# Patient Record
Sex: FEMALE | NOT HISPANIC OR LATINO | Employment: FULL TIME | URBAN - METROPOLITAN AREA
[De-identification: names, ages, dates, MRNs, and addresses within clinical notes are randomized per-mention and may not be internally consistent; named-entity substitution may affect disease eponyms.]

---
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
## 2019-01-02 DIAGNOSIS — M32.14 SYSTEMIC LUPUS ERYTHEMATOSUS WITH GLOMERULAR DISEASE, UNSPECIFIED SLE TYPE (H): ICD-10-CM

## 2019-01-03 RX ORDER — AMLODIPINE BESYLATE 10 MG/1
10 TABLET ORAL 2 TIMES DAILY
Qty: 180 TABLET | Refills: 0 | Status: SHIPPED | OUTPATIENT
Start: 2019-01-03 | End: 2020-01-22

## 2020-01-16 ENCOUNTER — TELEPHONE (OUTPATIENT)
Dept: RHEUMATOLOGY | Facility: CLINIC | Age: 20
End: 2020-01-16

## 2020-01-16 DIAGNOSIS — M32.14 SYSTEMIC LUPUS ERYTHEMATOSUS WITH GLOMERULAR DISEASE, UNSPECIFIED SLE TYPE (H): Primary | ICD-10-CM

## 2020-01-16 NOTE — TELEPHONE ENCOUNTER
I spoke to Cathy. She confirmed date and time of appointment and will have labs done prior to then. No further questions.

## 2020-01-16 NOTE — TELEPHONE ENCOUNTER
Please call patient. Ask her to have lab testing done at an F location prior to her appointment next week. Remind her of date and time of appt.   I placed lab orders today.

## 2020-01-20 ENCOUNTER — HOSPITAL ENCOUNTER (OUTPATIENT)
Dept: LAB | Facility: CLINIC | Age: 20
Discharge: HOME | End: 2020-01-20
Attending: PEDIATRICS | Admitting: PEDIATRICS
Payer: COMMERCIAL

## 2020-01-20 DIAGNOSIS — M32.14 SYSTEMIC LUPUS ERYTHEMATOSUS WITH GLOMERULAR DISEASE, UNSPECIFIED SLE TYPE (H): ICD-10-CM

## 2020-01-20 LAB
ALBUMIN SERPL-MCNC: 3.6 G/DL (ref 3.4–5)
ALBUMIN UR-MCNC: 50 MG/DL
ALP SERPL-CCNC: 68 U/L (ref 40–150)
ALT SERPL W P-5'-P-CCNC: 19 U/L (ref 0–50)
ANION GAP SERPL CALCULATED.3IONS-SCNC: 5 MMOL/L (ref 3–14)
APPEARANCE UR: CLEAR
AST SERPL W P-5'-P-CCNC: 9 U/L (ref 0–45)
BASOPHILS # BLD AUTO: 0.1 10E9/L (ref 0–0.2)
BASOPHILS NFR BLD AUTO: 0.6 %
BILIRUB SERPL-MCNC: 0.5 MG/DL (ref 0.2–1.3)
BILIRUB UR QL STRIP: NEGATIVE
BUN SERPL-MCNC: 12 MG/DL (ref 7–30)
C3 SERPL-MCNC: 107 MG/DL (ref 81–157)
C4 SERPL-MCNC: 19 MG/DL (ref 13–39)
CALCIUM SERPL-MCNC: 9.2 MG/DL (ref 8.5–10.1)
CHLORIDE SERPL-SCNC: 108 MMOL/L (ref 94–109)
CO2 SERPL-SCNC: 25 MMOL/L (ref 20–32)
COLOR UR AUTO: YELLOW
CREAT SERPL-MCNC: 0.74 MG/DL (ref 0.52–1.04)
CREAT UR-MCNC: 197 MG/DL
DIFFERENTIAL METHOD BLD: ABNORMAL
EOSINOPHIL # BLD AUTO: 0.6 10E9/L (ref 0–0.7)
EOSINOPHIL NFR BLD AUTO: 7 %
ERYTHROCYTE [DISTWIDTH] IN BLOOD BY AUTOMATED COUNT: 14.1 % (ref 10–15)
ERYTHROCYTE [SEDIMENTATION RATE] IN BLOOD BY WESTERGREN METHOD: 6 MM/H (ref 0–20)
GFR SERPL CREATININE-BSD FRML MDRD: >90 ML/MIN/{1.73_M2}
GLUCOSE SERPL-MCNC: 79 MG/DL (ref 70–99)
GLUCOSE UR STRIP-MCNC: NEGATIVE MG/DL
HCT VFR BLD AUTO: 46.6 % (ref 35–47)
HGB BLD-MCNC: 14.9 G/DL (ref 11.7–15.7)
HGB UR QL STRIP: NEGATIVE
IMM GRANULOCYTES # BLD: 0 10E9/L (ref 0–0.4)
IMM GRANULOCYTES NFR BLD: 0.1 %
KETONES UR STRIP-MCNC: NEGATIVE MG/DL
LEUKOCYTE ESTERASE UR QL STRIP: NEGATIVE
LYMPHOCYTES # BLD AUTO: 3.3 10E9/L (ref 0.8–5.3)
LYMPHOCYTES NFR BLD AUTO: 41.2 %
MCH RBC QN AUTO: 28.1 PG (ref 26.5–33)
MCHC RBC AUTO-ENTMCNC: 32 G/DL (ref 31.5–36.5)
MCV RBC AUTO: 88 FL (ref 78–100)
MONOCYTES # BLD AUTO: 0.4 10E9/L (ref 0–1.3)
MONOCYTES NFR BLD AUTO: 5.5 %
MUCOUS THREADS #/AREA URNS LPF: PRESENT /LPF
NEUTROPHILS # BLD AUTO: 3.7 10E9/L (ref 1.6–8.3)
NEUTROPHILS NFR BLD AUTO: 45.6 %
NITRATE UR QL: NEGATIVE
NRBC # BLD AUTO: 0 10*3/UL
NRBC BLD AUTO-RTO: 0 /100
PH UR STRIP: 6 PH (ref 5–7)
PLATELET # BLD AUTO: 239 10E9/L (ref 150–450)
POTASSIUM SERPL-SCNC: 3.7 MMOL/L (ref 3.4–5.3)
PROT SERPL-MCNC: 7.4 G/DL (ref 6.8–8.8)
PROT UR-MCNC: 0.41 G/L
PROT/CREAT 24H UR: 0.21 G/G CR (ref 0–0.2)
RBC # BLD AUTO: 5.31 10E12/L (ref 3.8–5.2)
RBC #/AREA URNS AUTO: 1 /HPF (ref 0–2)
SODIUM SERPL-SCNC: 138 MMOL/L (ref 133–144)
SOURCE: ABNORMAL
SP GR UR STRIP: 1.03 (ref 1–1.03)
SQUAMOUS #/AREA URNS AUTO: 4 /HPF (ref 0–1)
UROBILINOGEN UR STRIP-MCNC: NORMAL MG/DL (ref 0–2)
WBC # BLD AUTO: 8 10E9/L (ref 4–11)
WBC #/AREA URNS AUTO: 1 /HPF (ref 0–5)

## 2020-01-20 PROCEDURE — 81001 URINALYSIS AUTO W/SCOPE: CPT | Performed by: PEDIATRICS

## 2020-01-20 PROCEDURE — 86225 DNA ANTIBODY NATIVE: CPT | Performed by: PEDIATRICS

## 2020-01-20 PROCEDURE — 86160 COMPLEMENT ANTIGEN: CPT | Performed by: PEDIATRICS

## 2020-01-20 PROCEDURE — 85652 RBC SED RATE AUTOMATED: CPT | Performed by: PEDIATRICS

## 2020-01-20 PROCEDURE — 84156 ASSAY OF PROTEIN URINE: CPT | Performed by: PEDIATRICS

## 2020-01-20 PROCEDURE — 80053 COMPREHEN METABOLIC PANEL: CPT | Performed by: PEDIATRICS

## 2020-01-20 PROCEDURE — 85025 COMPLETE CBC W/AUTO DIFF WBC: CPT | Performed by: PEDIATRICS

## 2020-01-21 LAB — DSDNA AB SER-ACNC: 33 IU/ML

## 2020-01-21 NOTE — PROGRESS NOTES
Patient Active Problem List   Diagnosis     Systemic lupus erythematosus (H)     Immunosuppression (H)     Lupus (systemic lupus erythematosus) (H)     Lupus nephritis, ISN/RPS class IV (H)     Hypertension          Subjective:     Cathy is a 19 year old female who was seen in Pediatric Rheumatology clinic today for a follow-up visit of SLE. Cathy is accompanied today by both parents.  Cathy was last seen in clinic on 8/15/2018 by Dr. Lio Jo, at which time she was not taking all of her medications as prescribed but overall did not have any major concerns.  She was lost to follow-up there after, despite multiple attempts we could not get her to return calls or follow-up.  Today she tells me that shortly after that visit she continued to take some of her medications intermittently and is likely out of her medications by the winter 2019.  Approximately 1 to 1-1/2 years ago.  Since then she has felt increasingly well.  She denies any problems with lupus related symptoms such as fever rash joint pain leg swelling.  Her 14 system review as noted below is negative    She was worried increasingly that she may be having some active lupus and just not be aware and decided she better come back for a follow-up visit.  By her she was on the schedule I asked her to repeat her labs a few days before, those are listed below and are remarkably normal!    Review of systems:     Review of 14 systems is negative other than noted above.        Allergies:     Allergies   Allergen Reactions     Ibuprofen Swelling     Swelling of face with a higher dose          Medications:     None       Medical --  Family -- Social History:     Past Medical History:   Diagnosis Date     Anemia of chronic disease      Anxious appearance 1/3/2018     Long term systemic steroid user 10/17/2017     Lupus nephritis, ISN/RPS class IV (H)      Non-adherence to medical treatment      Psychosis (H) 1/2/2018     Renal hypertension      Skin breakdown  "11/13/2017     SLE (systemic lupus erythematosus related syndrome) (H)      Past Surgical History:   Procedure Laterality Date     PERCUTANEOUS BIOPSY KIDNEY Right 10/6/2017    Procedure: PERCUTANEOUS BIOPSY KIDNEY;  Kidney Biopsy Percutaneous Right ;  Surgeon: Preston Russo MD;  [facility]     Family History   Problem Relation Age of Onset     Thyroid Disease Other         Cousin: hyperthyroid     Hypertension Paternal Uncle      Diabetes Maternal Aunt      Social History     Social History Narrative    Family History     Father Eliseo: Occupation [employer]      Mother Lupe: Occupation      Brother Hakan 07/11/2007: History is negative     Sister Elizabeth 06/11/2000: History is negative     Pat Sister Latasha 09/24/1985: History is negative     Pat Sister Shira 05/15/1988: History is negative     Mat Grandfather: Anemia     Family History: Autoimmune disorder Cousin        Social History     Home/Environment    Lives with: Father, Mother, Siblings. Living situation: Home.        /School/Work    Grade level: She graduated from high school in 2017.  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 rate 20, height 1.58 m (5' 2.21\"), weight 59.5 kg (131 lb 2.8 oz).    Constitutional: Alert, no distress and cooperative.  Head and Eyes: No alopecia, PEERL, conjunctiva clear.  ENT: Mucous membranes moist, healthy appearing dentition, no intraoral ulcers, and no intranasal ulcers.  Neck: Neck supple. No lymphadenopathy. Thyroid symmetric, normal size.  Respiratory: Negative, clear to auscultation.  Cardiovascular: Negative, RRR. No murmurs, no rubs.  Gastrointestinal: Abdomen soft, non-tender. No masses. No hepatosplenomegaly.  : Deferred.  Neurologic: Gait normal. Sensation grossly " normal.  Psychiatric: Mentation appears normal and affect normal.  Hematologic/Lymphatic/Immunologic: Normal cervical, axillary lymph nodes.  Skin: Diffuse striae in the lower extremities  Musculoskeletal: Gait normal, extremities warm, well perfused. Detailed musculoskeletal exam was performed, normal muscle strength of trunk, upper and lower extremities and no sign of swelling, tenderness, decreased ROM, or tenderness at typical sites of enthesitis unless otherwise noted.          Last Imaging Results:     Results for orders placed or performed in visit on 01/29/18   XR Chest 2 Views    Narrative    XR CHEST 2 VW  1/29/2018 9:43 AM      HISTORY: ; Systemic lupus erythematosus with lung involvement,  unspecified SLE type (H)    COMPARISON: 10/4/2017    FINDINGS: Frontal and lateral views of the chest. The cardiac  silhouette size and pulmonary vasculature are within normal limits.  There is no significant pleural effusion or pneumothorax. There are no  focal pulmonary opacities. The visualized upper abdomen and bones  appear normal.      Impression    IMPRESSION: Clear lungs.    SALEEM BECERRA MD          Last Lab Results:     No visits with results within 2 Day(s) from this visit.   Latest known visit with results is:   Hospital Outpatient Visit on 01/20/2020   Component Date Value     Sed Rate 01/20/2020 6      Complement C4 01/20/2020 19      Complement C3 01/20/2020 107      Sodium 01/20/2020 138      Potassium 01/20/2020 3.7      Chloride 01/20/2020 108      Carbon Dioxide 01/20/2020 25      Anion Gap 01/20/2020 5      Glucose 01/20/2020 79      Urea Nitrogen 01/20/2020 12      Creatinine 01/20/2020 0.74      GFR Estimate 01/20/2020 >90      GFR Estimate If Black 01/20/2020 >90      Calcium 01/20/2020 9.2      Bilirubin Total 01/20/2020 0.5      Albumin 01/20/2020 3.6      Protein Total 01/20/2020 7.4      Alkaline Phosphatase 01/20/2020 68      ALT 01/20/2020 19      AST 01/20/2020 9      DNA-ds 01/20/2020 33*      WBC 01/20/2020 8.0      RBC Count 01/20/2020 5.31*     Hemoglobin 01/20/2020 14.9      Hematocrit 01/20/2020 46.6      MCV 01/20/2020 88      MCH 01/20/2020 28.1      MCHC 01/20/2020 32.0      RDW 01/20/2020 14.1      Platelet Count 01/20/2020 239      Diff Method 01/20/2020 Automated Method      % Neutrophils 01/20/2020 45.6      % Lymphocytes 01/20/2020 41.2      % Monocytes 01/20/2020 5.5      % Eosinophils 01/20/2020 7.0      % Basophils 01/20/2020 0.6      % Immature Granulocytes 01/20/2020 0.1      Nucleated RBCs 01/20/2020 0      Absolute Neutrophil 01/20/2020 3.7      Absolute Lymphocytes 01/20/2020 3.3      Absolute Monocytes 01/20/2020 0.4      Absolute Eosinophils 01/20/2020 0.6      Absolute Basophils 01/20/2020 0.1      Abs Immature Granulocytes 01/20/2020 0.0      Absolute Nucleated RBC 01/20/2020 0.0      Protein Random Urine 01/20/2020 0.41      Protein Total Urine g/gr* 01/20/2020 0.21*     Color Urine 01/20/2020 Yellow      Appearance Urine 01/20/2020 Clear      Glucose Urine 01/20/2020 Negative      Bilirubin Urine 01/20/2020 Negative      Ketones Urine 01/20/2020 Negative      Specific Gravity Urine 01/20/2020 1.026      Blood Urine 01/20/2020 Negative      pH Urine 01/20/2020 6.0      Protein Albumin Urine 01/20/2020 50*     Urobilinogen mg/dL 01/20/2020 Normal      Nitrite Urine 01/20/2020 Negative      Leukocyte Esterase Urine 01/20/2020 Negative      Source 01/20/2020 Midstream Urine      WBC Urine 01/20/2020 1      RBC Urine 01/20/2020 1      Squamous Epithelial /HPF* 01/20/2020 4*     Mucous Urine 01/20/2020 Present*     Creatinine Urine 01/20/2020 197           Assessment :      Systemic lupus erythematosus with glomerular disease, unspecified SLE type (H)  Lupus nephritis, ISN/RPS class IV (H)  Systemic lupus erythematosus (SLE) with pericarditis, unspecified SLE type (H)  Hypertension secondary to other renal disorders    Cathy is a 19-year-old girl with a history of quite severe  systemic lupus erythematosus due to multiple factors including poor adherence to medications as a result of difficulty with adjustment to illness in addition 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  I had thought when I did not see her come back that she was likely being cared for by an adult rheumatologist as I am surprised today that she can look so well off medications completely.  Other than mild hypertension today I think she really looks amazing and I would not recommend she restart medications at this time.  I did however emphasize the need for regular laboratory testing to monitor for problems.  It was much easier for her to make decisions about treatment if she knows if there is any underlying serious problems.    With regard to the hypertension today, she tells me she was nervous and we decided together that this needs to be evaluated with her new physician.         Recommendations and follow-up:     1. Treatment at this time.  I gave her recommendations and phone numbers for both [facility] the [facility]ology and [place] [facility] based on where she lives in [place].  I asked her to call for appointments and to let us know so that we advise her on how to get records sent.  The next next eturn visit: Return in about 5 months (around 6/22/2020).    If there are any new questions or concerns, I would be glad to help and can be reached through our main office at [phone] or our paging  at [phone].    This document serves as a record of the services and decisions personally performed and made by Jacey Fuchs MD. It was created on her behalf by Elaine Davenport, a trained medical scribe. The creation of this document is based the provider's statements to the medical scribe. The documentation recorded by the scribe accurately reflects the services I personally performed and the decisions made by me. I spent a total of 25 minutes  face-to-face with Cathy during today's office visit.  Over 50% of this time was spent counseling the patient and/or coordinating care. See note for details.    Jacey Fuchs MD, MS    CC  Patient Care Team:  Jacey Fuchs MD as PCP - General (Pediatric Rheumatology)  Jacey Fuchs MD as MD (Pediatric Rheumatology)  Osvaldo Block MD as MD (Pediatric Nephrology)  Carina Davis, NP as Assigned PCP  [facility]  Kali Hicks, SOPHIA CNP as Nurse Practitioner (Nurse Practitioner)  SELF, REFERRED    Copy to patient  CAITLYN [facility]TISTE SEBASTIAN  [address] [place] [address]

## 2020-01-22 ENCOUNTER — OFFICE VISIT (OUTPATIENT)
Dept: RHEUMATOLOGY | Facility: CLINIC | Age: 20
End: 2020-01-22
Attending: PEDIATRICS
Payer: COMMERCIAL

## 2020-01-22 VITALS
TEMPERATURE: 98.1 F | BODY MASS INDEX: 24.14 KG/M2 | DIASTOLIC BLOOD PRESSURE: 85 MMHG | RESPIRATION RATE: 20 BRPM | SYSTOLIC BLOOD PRESSURE: 122 MMHG | WEIGHT: 131.17 LBS | HEART RATE: 92 BPM | HEIGHT: 62 IN

## 2020-01-22 DIAGNOSIS — I15.1 HYPERTENSION SECONDARY TO OTHER RENAL DISORDERS: ICD-10-CM

## 2020-01-22 DIAGNOSIS — M32.14 SYSTEMIC LUPUS ERYTHEMATOSUS WITH GLOMERULAR DISEASE, UNSPECIFIED SLE TYPE (H): Primary | ICD-10-CM

## 2020-01-22 DIAGNOSIS — M32.14 LUPUS NEPHRITIS, ISN/RPS CLASS IV (H): ICD-10-CM

## 2020-01-22 DIAGNOSIS — M32.12 SYSTEMIC LUPUS ERYTHEMATOSUS (SLE) WITH PERICARDITIS, UNSPECIFIED SLE TYPE (H): ICD-10-CM

## 2020-01-22 PROBLEM — R45.89 ANXIOUS APPEARANCE: Status: RESOLVED | Noted: 2018-01-03 | Resolved: 2020-01-22

## 2020-01-22 PROBLEM — Z79.52 LONG TERM SYSTEMIC STEROID USER: Status: RESOLVED | Noted: 2017-10-17 | Resolved: 2020-01-22

## 2020-01-22 PROBLEM — F29 PSYCHOSIS (H): Status: RESOLVED | Noted: 2018-01-02 | Resolved: 2020-01-22

## 2020-01-22 PROBLEM — R23.8 SKIN BREAKDOWN: Status: RESOLVED | Noted: 2017-11-13 | Resolved: 2020-01-22

## 2020-01-22 PROCEDURE — G0463 HOSPITAL OUTPT CLINIC VISIT: HCPCS | Mod: ZF

## 2020-01-22 ASSESSMENT — MIFFLIN-ST. JEOR: SCORE: 1321.5

## 2020-01-22 ASSESSMENT — PAIN SCALES - GENERAL: PAINLEVEL: NO PAIN (0)

## 2020-01-22 NOTE — NURSING NOTE
"Chief Complaint   Patient presents with     Arthritis     Lupus.     Vitals:    01/22/20 0755 01/22/20 0758   BP: (!) 109/93 122/85   BP Location: Left arm    Patient Position: Dangled    Pulse: 92    Resp: 20    Temp: 98.1  F (36.7  C)    TempSrc: Oral    Weight: 131 lb 2.8 oz (59.5 kg)    Height: 5' 2.21\" (158 cm)       Christina Narvaez M.A.  January 22, 2020  "

## 2020-01-22 NOTE — PATIENT INSTRUCTIONS
[facility]  [place] [facility]    Our [place] [facility] is located in the south west corner of [facility] and [address] [place] [address] in the [facility]. Access to parking is off of [address] between [facility] and [facility].    [address], [address], [place], [address]    Tel: [phone]  _______________________________________  [facility]    [facility] - Rheumatology  [address] [place] [address]  [phone]

## 2020-01-22 NOTE — LETTER
1/22/2020      RE: Cathy Freedman  [address] [place] [address]       Patient Active Problem List   Diagnosis     Systemic lupus erythematosus (H)     Immunosuppression (H)     Lupus (systemic lupus erythematosus) (H)     Lupus nephritis, ISN/RPS class IV (H)     Hypertension          Subjective:     Cathy is a 19 year old female who was seen in Pediatric Rheumatology clinic today for a follow-up visit of SLE. Cathy is accompanied today by both parents.  Cathy was last seen in clinic on 8/15/2018 by Dr. Lio Jo, at which time she was not taking all of her medications as prescribed but overall did not have any major concerns.  She was lost to follow-up there after, despite multiple attempts we could not get her to return calls or follow-up.  Today she tells me that shortly after that visit she continued to take some of her medications intermittently and is likely out of her medications by the winter 2019.  Approximately 1 to 1-1/2 years ago.  Since then she has felt increasingly well.  She denies any problems with lupus related symptoms such as fever rash joint pain leg swelling.  Her 14 system review as noted below is negative    She was worried increasingly that she may be having some active lupus and just not be aware and decided she better come back for a follow-up visit.  By her she was on the schedule I asked her to repeat her labs a few days before, those are listed below and are remarkably normal!    Review of systems:     Review of 14 systems is negative other than noted above.        Allergies:     Allergies   Allergen Reactions     Ibuprofen Swelling     Swelling of face with a higher dose          Medications:     None       Medical --  Family -- Social History:     Past Medical History:   Diagnosis Date     Anemia of chronic disease      Anxious appearance 1/3/2018     Long term systemic steroid user 10/17/2017     Lupus nephritis, ISN/RPS class IV (H)      Non-adherence to  "medical treatment      Psychosis (H) 1/2/2018     Renal hypertension      Skin breakdown 11/13/2017     SLE (systemic lupus erythematosus related syndrome) (H)      Past Surgical History:   Procedure Laterality Date     PERCUTANEOUS BIOPSY KIDNEY Right 10/6/2017    Procedure: PERCUTANEOUS BIOPSY KIDNEY;  Kidney Biopsy Percutaneous Right ;  Surgeon: Preston Russo MD;  [facility]     Family History   Problem Relation Age of Onset     Thyroid Disease Other         Cousin: hyperthyroid     Hypertension Paternal Uncle      Diabetes Maternal Aunt      Social History     Social History Narrative    Family History     Father Eliseo: Occupation [employer]      Mother Lupe: Occupation      Brother Hakan 07/11/2007: History is negative     Sister Elizabeth 06/11/2000: History is negative     Pat Sister Latasha 09/24/1985: History is negative     Pat Sister Shira 05/15/1988: History is negative     Mat Grandfather: Anemia     Family History: Autoimmune disorder Cousin        Social History     Home/Environment    Lives with: Father, Mother, Siblings. Living situation: Home.        /School/Work    Grade level: She graduated from high school in 2017.  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 rate 20, height 1.58 m (5' 2.21\"), weight 59.5 kg (131 lb 2.8 oz).    Constitutional: Alert, no distress and cooperative.  Head and Eyes: No alopecia, PEERL, conjunctiva clear.  ENT: Mucous membranes moist, healthy appearing dentition, no intraoral ulcers, and no intranasal ulcers.  Neck: Neck supple. No lymphadenopathy. Thyroid symmetric, normal size.  Respiratory: Negative, clear to auscultation.  Cardiovascular: Negative, RRR. No murmurs, no rubs.  Gastrointestinal: Abdomen soft, non-tender. No " masses. No hepatosplenomegaly.  : Deferred.  Neurologic: Gait normal. Sensation grossly normal.  Psychiatric: Mentation appears normal and affect normal.  Hematologic/Lymphatic/Immunologic: Normal cervical, axillary lymph nodes.  Skin: Diffuse striae in the lower extremities  Musculoskeletal: Gait normal, extremities warm, well perfused. Detailed musculoskeletal exam was performed, normal muscle strength of trunk, upper and lower extremities and no sign of swelling, tenderness, decreased ROM, or tenderness at typical sites of enthesitis unless otherwise noted.          Last Imaging Results:     Results for orders placed or performed in visit on 01/29/18   XR Chest 2 Views    Narrative    XR CHEST 2 VW  1/29/2018 9:43 AM      HISTORY: ; Systemic lupus erythematosus with lung involvement,  unspecified SLE type (H)    COMPARISON: 10/4/2017    FINDINGS: Frontal and lateral views of the chest. The cardiac  silhouette size and pulmonary vasculature are within normal limits.  There is no significant pleural effusion or pneumothorax. There are no  focal pulmonary opacities. The visualized upper abdomen and bones  appear normal.      Impression    IMPRESSION: Clear lungs.    SALEEM BECERRA MD          Last Lab Results:     No visits with results within 2 Day(s) from this visit.   Latest known visit with results is:   Hospital Outpatient Visit on 01/20/2020   Component Date Value     Sed Rate 01/20/2020 6      Complement C4 01/20/2020 19      Complement C3 01/20/2020 107      Sodium 01/20/2020 138      Potassium 01/20/2020 3.7      Chloride 01/20/2020 108      Carbon Dioxide 01/20/2020 25      Anion Gap 01/20/2020 5      Glucose 01/20/2020 79      Urea Nitrogen 01/20/2020 12      Creatinine 01/20/2020 0.74      GFR Estimate 01/20/2020 >90      GFR Estimate If Black 01/20/2020 >90      Calcium 01/20/2020 9.2      Bilirubin Total 01/20/2020 0.5      Albumin 01/20/2020 3.6      Protein Total 01/20/2020 7.4      Alkaline  Phosphatase 01/20/2020 68      ALT 01/20/2020 19      AST 01/20/2020 9      DNA-ds 01/20/2020 33*     WBC 01/20/2020 8.0      RBC Count 01/20/2020 5.31*     Hemoglobin 01/20/2020 14.9      Hematocrit 01/20/2020 46.6      MCV 01/20/2020 88      MCH 01/20/2020 28.1      MCHC 01/20/2020 32.0      RDW 01/20/2020 14.1      Platelet Count 01/20/2020 239      Diff Method 01/20/2020 Automated Method      % Neutrophils 01/20/2020 45.6      % Lymphocytes 01/20/2020 41.2      % Monocytes 01/20/2020 5.5      % Eosinophils 01/20/2020 7.0      % Basophils 01/20/2020 0.6      % Immature Granulocytes 01/20/2020 0.1      Nucleated RBCs 01/20/2020 0      Absolute Neutrophil 01/20/2020 3.7      Absolute Lymphocytes 01/20/2020 3.3      Absolute Monocytes 01/20/2020 0.4      Absolute Eosinophils 01/20/2020 0.6      Absolute Basophils 01/20/2020 0.1      Abs Immature Granulocytes 01/20/2020 0.0      Absolute Nucleated RBC 01/20/2020 0.0      Protein Random Urine 01/20/2020 0.41      Protein Total Urine g/gr* 01/20/2020 0.21*     Color Urine 01/20/2020 Yellow      Appearance Urine 01/20/2020 Clear      Glucose Urine 01/20/2020 Negative      Bilirubin Urine 01/20/2020 Negative      Ketones Urine 01/20/2020 Negative      Specific Gravity Urine 01/20/2020 1.026      Blood Urine 01/20/2020 Negative      pH Urine 01/20/2020 6.0      Protein Albumin Urine 01/20/2020 50*     Urobilinogen mg/dL 01/20/2020 Normal      Nitrite Urine 01/20/2020 Negative      Leukocyte Esterase Urine 01/20/2020 Negative      Source 01/20/2020 Midstream Urine      WBC Urine 01/20/2020 1      RBC Urine 01/20/2020 1      Squamous Epithelial /HPF* 01/20/2020 4*     Mucous Urine 01/20/2020 Present*     Creatinine Urine 01/20/2020 197           Assessment :      Systemic lupus erythematosus with glomerular disease, unspecified SLE type (H)  Lupus nephritis, ISN/RPS class IV (H)  Systemic lupus erythematosus (SLE) with pericarditis, unspecified SLE type (H)  Hypertension  secondary to other renal disorders    Cathy is a 19-year-old girl with a history of quite severe systemic lupus erythematosus due to multiple factors including poor adherence to medications as a result of difficulty with adjustment to illness in addition 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  I had thought when I did not see her come back that she was likely being cared for by an adult rheumatologist as I am surprised today that she can look so well off medications completely.  Other than mild hypertension today I think she really looks amazing and I would not recommend she restart medications at this time.  I did however emphasize the need for regular laboratory testing to monitor for problems.  It was much easier for her to make decisions about treatment if she knows if there is any underlying serious problems.    With regard to the hypertension today, she tells me she was nervous and we decided together that this needs to be evaluated with her new physician.         Recommendations and follow-up:     1. Treatment at this time.  I gave her recommendations and phone numbers for both [facility] rheumatology and [place] [facility] based on where she lives in [place].  I asked her to call for appointments and to let us know so that we advise her on how to get records sent.  The next next eturn visit: Return in about 5 months (around 6/22/2020).    If there are any new questions or concerns, I would be glad to help and can be reached through our main office at [phone] or our paging  at [phone].    This document serves as a record of the services and decisions personally performed and made by Jacey Fuchs MD. It was created on her behalf by Elaine Davenport, a trained medical scribe. The creation of this document is based the provider's statements to the medical scribe. The documentation recorded by the scribe accurately reflects the  services I personally performed and the decisions made by me. I spent a total of 25 minutes face-to-face with Cathy during today's office visit.  Over 50% of this time was spent counseling the patient and/or coordinating care. See note for details.    Jacey Fuchs MD, MS    CC  Patient Care Team:    Osvaldo Block MD as MD (Pediatric Nephrology)  Carina Davis NP as Assigned PCP  [facility]  Kali Hicks APRN CNP as Nurse Practitioner (Nurse Practitioner)    Copy to patient  EMILE, EVELIA CAMPOS  [address] [place] [address]

## 2020-03-19 ENCOUNTER — NURSE TRIAGE (OUTPATIENT)
Dept: NURSING | Facility: CLINIC | Age: 20
End: 2020-03-19

## 2020-03-19 NOTE — TELEPHONE ENCOUNTER
Patient calling reporting she had some nasal discharge this morning that has resolved. Afebrile. Denies cough. Afebrile.     Reporting she is immunocompromised and is questioning if she should do anything different to protect herself.    Caller verbalized understanding. Denies further questions.    Kaitlin Olvera RN  [facility]    Reviewed general instructions for patient with COVID19 symptoms.       Instructions for Patients  Your symptoms could be due to COVID-19, but it also could be due to a number of other respiratory illnesses.  We are not doing testing at this time for COVID-19 unless symptoms are severe enough to require hospitalization.  It is recommended that you stay home to prevent the spread of your illness.  To do this follow these instructions:    Regardless of if you have been tested or not:  Patient who have symptoms (cough, fever, or shortness of breath), need to isolate for 7 days from when symptoms started OR 72 hours after fever resolves (without fever reducing medications) AND improvement of respiratory symptoms (whichever is longer).      Isolate yourself at home (in own room/own bathroom if possible)    Do Not allow any visitors    Do Not go to work or school    Do Not go to [employer],  centers, shopping, or other public places.    Do Not shake hands.    Avoid close and intimate contact with others (hugging, kissing).    Follow CDC recommendations for household cleaning of frequently touched services.     After the initial 7 days, continue to isolate yourself from household members as much as possible. To continue decrease the risk of community spread and exposure, you and any members of your household should limit activities in public for 14 days after starting home isolation.     You can reference the following CDC link for helpful home isolation/care tips:  https://www.cdc.gov/coronavirus/2019-ncov/downloads/10Things.pdf    Protect Others:    Cover your mouth and nose  with a mask, disposable tissue or wash cloth to avoid spreading germs to others.    Wash your hands and face frequently with soap and water.    Fever Medicines:    For fever relief, take acetaminophen or ibuprofen.    Treat fevers above 101  F (38.3  C) to lower fevers and make you more comfortable.     Acetaminophen (e.g., Tylenol): Take 650 mg (two 325 mg pills) by mouth every 4-6 hours as needed of regular strength Tylenol or 1,000 mg (two 500 mg pills) every 8 hours as needed of Extra Strength Tylenol.     Ibuprofen (e.g., Motrin, Advil): Take 400 mg (two 200 mg pills) by mouth every 6 hours as needed.     Acetaminophen is thought to be safer than ibuprofen or naproxen for people over 65 years old. Acetaminophen is in many OTC and prescription medicines. It might be in more than one medicine that you are taking. You need to be careful and not take an overdose. Before taking any medicine, read all the instructions on the package.    Caution - NSAIDs (e.g., ibuprofen, naproxen): Do not take nonsteroidal anti-inflammatory drugs (NSAIDs) if you have stomach problems, kidney disease, heart failure, or other contraindications to using this type of medicine. Do not take NSAID medicines for over 7 days without consulting your PCP. Do not take NSAID medicines if you are pregnant. Do not take NSAID medicines if you are also taking blood thinners.     Call Back If: Breathing difficulty develops or you become worse.    Thank you for limiting contact with others, wearing a simple mask to cover your cough, practice good hand hygiene habits and accessing our virtual services where possible to limit the spread of this virus.    For more information about COVID19 and options for caring for yourself at home, please visit the CDC website at https://www.cdc.gov/coronavirus/2019-ncov/about/steps-when-sick.html  For more options for care at [facility], please visit our website at  [email or web address]   Additional Information    Negative: [1] Difficulty breathing AND [2] severe (struggling for each breath, unable to speak or cry, grunting sounds, severe retractions) (Triage tip: Listen to the child's breathing.)    Negative: Slow, shallow, weak breathing    Negative: Very weak (doesn't move or make eye contact)    Negative: Sounds like a life-threatening emergency to the triager    Negative: [1] Age < 12 weeks AND [2] fever 100.4 F (38.0 C) or higher rectally    Negative: [1] Difficulty breathing AND [2] not severe AND [3] not relieved by cleaning out the nose (Triage tip: Listen to the child's breathing.)    Negative: Wheezing (purring or whistling sound) occurs    Negative: [1] Drooling or spitting out saliva AND [2] can't swallow fluids    Negative: Not alert when awake (true lethargy)    Negative: [1] Fever AND [2] weak immune system (sickle cell disease, HIV, splenectomy, chemotherapy, organ transplant, chronic oral steroids, etc)    Negative: [1] Fever AND [2] > 105 F (40.6 C) by any route OR axillary > 104 F (40 C)    Negative: Child sounds very sick or weak to the triager    Negative: High-risk child (e.g., underlying severe lung disease such as CF or trach)    Negative: Runny nose is caused by pollen or other allergies    Negative: Bronchiolitis or RSV has been diagnosed within the last 2 weeks    Negative: Wheezing is present    Negative: Cough is the main symptom    Negative: Sore throat is the only symptom    Negative: [1] Crying continuously AND [2] cannot be comforted AND [3] present > 2 hours    Negative: Earache also present    Negative: [1] Age < 2 years AND [2] ear infection suspected by triager    Negative: Cloudy discharge from ear canal    Negative: [1] Age > 5 years AND [2] sinus pain around cheekbone or eye (not just congestion) AND [3] fever    Negative: Fever present > 3 days (72 hours)    Negative: [1] Fever returns after gone for over 24  hours AND [2] symptoms worse    Negative: [1] New fever develops after having a cold for 3 or more days (over 72 hours) AND [2] symptoms worse    Negative: [1] Sore throat is the main symptom AND [2] present > 5 days    Negative: [1] Age > 5 years AND [2] sinus pain persists after using nasal washes and pain medicine > 24 hours AND [3] no fever    Negative: Yellow scabs around the nasal opening    Negative: [1] Blood-tinged nasal discharge AND [2] present > 24 hours after using precautions in care advice    Negative: Blocked nose keeps from sleeping after using nasal washes several times    Negative: [1] Nasal discharge AND [2] present > 14 days    Cold with no complications    Protocols used: COLDS-P-

## 2020-11-22 ENCOUNTER — TRANSFERRED RECORDS (OUTPATIENT)
Dept: HEALTH INFORMATION MANAGEMENT | Facility: CLINIC | Age: 20
End: 2020-11-22

## 2021-01-02 ENCOUNTER — TRANSFERRED RECORDS (OUTPATIENT)
Dept: HEALTH INFORMATION MANAGEMENT | Facility: CLINIC | Age: 21
End: 2021-01-02

## 2021-02-22 ENCOUNTER — OFFICE VISIT (OUTPATIENT)
Dept: INTERNAL MEDICINE | Facility: CLINIC | Age: 21
End: 2021-02-22
Payer: COMMERCIAL

## 2021-02-22 VITALS
HEART RATE: 83 BPM | HEIGHT: 62 IN | OXYGEN SATURATION: 98 % | SYSTOLIC BLOOD PRESSURE: 120 MMHG | DIASTOLIC BLOOD PRESSURE: 78 MMHG | RESPIRATION RATE: 16 BRPM | WEIGHT: 131.6 LBS | BODY MASS INDEX: 24.22 KG/M2 | TEMPERATURE: 97.7 F

## 2021-02-22 DIAGNOSIS — M32.9 SYSTEMIC LUPUS ERYTHEMATOSUS, UNSPECIFIED SLE TYPE, UNSPECIFIED ORGAN INVOLVEMENT STATUS (H): Primary | ICD-10-CM

## 2021-02-22 DIAGNOSIS — Z23 NEED FOR PROPHYLACTIC VACCINATION AND INOCULATION AGAINST INFLUENZA: ICD-10-CM

## 2021-02-22 LAB
ANION GAP SERPL CALCULATED.3IONS-SCNC: 3 MMOL/L (ref 3–14)
BUN SERPL-MCNC: 18 MG/DL (ref 7–30)
CALCIUM SERPL-MCNC: 9.1 MG/DL (ref 8.5–10.1)
CHLORIDE SERPL-SCNC: 108 MMOL/L (ref 94–109)
CO2 SERPL-SCNC: 29 MMOL/L (ref 20–32)
CREAT SERPL-MCNC: 0.79 MG/DL (ref 0.52–1.04)
CRP SERPL-MCNC: <2.9 MG/L (ref 0–8)
ERYTHROCYTE [DISTWIDTH] IN BLOOD BY AUTOMATED COUNT: 13.5 % (ref 10–15)
GFR SERPL CREATININE-BSD FRML MDRD: >90 ML/MIN/{1.73_M2}
GLUCOSE SERPL-MCNC: 53 MG/DL (ref 70–99)
HCT VFR BLD AUTO: 42.3 % (ref 35–47)
HGB BLD-MCNC: 13.8 G/DL (ref 11.7–15.7)
MCH RBC QN AUTO: 28.6 PG (ref 26.5–33)
MCHC RBC AUTO-ENTMCNC: 32.6 G/DL (ref 31.5–36.5)
MCV RBC AUTO: 88 FL (ref 78–100)
PLATELET # BLD AUTO: 212 10E9/L (ref 150–450)
POTASSIUM SERPL-SCNC: 3.9 MMOL/L (ref 3.4–5.3)
RBC # BLD AUTO: 4.82 10E12/L (ref 3.8–5.2)
SODIUM SERPL-SCNC: 140 MMOL/L (ref 133–144)
WBC # BLD AUTO: 8.6 10E9/L (ref 4–11)

## 2021-02-22 PROCEDURE — 85027 COMPLETE CBC AUTOMATED: CPT | Performed by: NURSE PRACTITIONER

## 2021-02-22 PROCEDURE — 36415 COLL VENOUS BLD VENIPUNCTURE: CPT | Performed by: NURSE PRACTITIONER

## 2021-02-22 PROCEDURE — 90471 IMMUNIZATION ADMIN: CPT | Performed by: NURSE PRACTITIONER

## 2021-02-22 PROCEDURE — 80048 BASIC METABOLIC PNL TOTAL CA: CPT | Performed by: NURSE PRACTITIONER

## 2021-02-22 PROCEDURE — 86140 C-REACTIVE PROTEIN: CPT | Performed by: NURSE PRACTITIONER

## 2021-02-22 PROCEDURE — 90686 IIV4 VACC NO PRSV 0.5 ML IM: CPT | Performed by: NURSE PRACTITIONER

## 2021-02-22 PROCEDURE — 99203 OFFICE O/P NEW LOW 30 MIN: CPT | Mod: 25 | Performed by: NURSE PRACTITIONER

## 2021-02-22 ASSESSMENT — MIFFLIN-ST. JEOR: SCORE: 1315.18

## 2021-02-22 NOTE — LETTER
February 22, 2021      Cathy MORIN Tano  [address] [place] [address]        Dear ,    We are writing to inform you of your test results.    Your recent lab results were normal.  Please see rheumatology as planned.     Resulted Orders   CBC with platelets   Result Value Ref Range    WBC 8.6 4.0 - 11.0 10e9/L    RBC Count 4.82 3.8 - 5.2 10e12/L    Hemoglobin 13.8 11.7 - 15.7 g/dL    Hematocrit 42.3 35.0 - 47.0 %    MCV 88 78 - 100 fl    MCH 28.6 26.5 - 33.0 pg    MCHC 32.6 31.5 - 36.5 g/dL    RDW 13.5 10.0 - 15.0 %    Platelet Count 212 150 - 450 10e9/L   Basic metabolic panel   Result Value Ref Range    Sodium 140 133 - 144 mmol/L    Potassium 3.9 3.4 - 5.3 mmol/L    Chloride 108 94 - 109 mmol/L    Carbon Dioxide 29 20 - 32 mmol/L    Anion Gap 3 3 - 14 mmol/L    Glucose 53 (L) 70 - 99 mg/dL      Comment:      Fasting specimen    Urea Nitrogen 18 7 - 30 mg/dL    Creatinine 0.79 0.52 - 1.04 mg/dL    GFR Estimate >90 >60 mL/min/[1.73_m2]      Comment:      Non  GFR Calc  Starting 12/18/2018, serum creatinine based estimated GFR (eGFR) will be   calculated using the Chronic Kidney Disease Epidemiology Collaboration   (CKD-EPI) equation.      GFR Estimate If Black >90 >60 mL/min/[1.73_m2]      Comment:       GFR Calc  Starting 12/18/2018, serum creatinine based estimated GFR (eGFR) will be   calculated using the Chronic Kidney Disease Epidemiology Collaboration   (CKD-EPI) equation.      Calcium 9.1 8.5 - 10.1 mg/dL   CRP inflammation   Result Value Ref Range    CRP Inflammation <2.9 0.0 - 8.0 mg/L       If you have any questions or concerns, please call the clinic at the number listed above.       Sincerely,      Carina Davis NP

## 2021-02-22 NOTE — PROGRESS NOTES
"    Assessment & Plan     Systemic lupus erythematosus, unspecified SLE type, unspecified organ involvement status (H)    - CBC with platelets  - Basic metabolic panel  - CRP inflammation  - Rheumatology Referral; Future    Need for prophylactic vaccination and inoculation against influenza    - INFLUENZA VACCINE IM > 6 MONTHS VALENT IIV4 [96618]                 No follow-ups on file.    Carina Davis NP  [facility] ADELA Morgan is a 20 year old who presents for the following health issues     HPI       New Patient/Transfer of Care    H/o Lupus, last seen 14 months ago by outside pediatric rheumatology  No current meds, denies fever, rashes, joint pain  C/o intermittent fatigue         Review of Systems   Constitutional, HEENT, cardiovascular, pulmonary, gi and gu systems are negative, except as otherwise noted.      Objective    /78 (BP Location: Right arm, Patient Position: Sitting, Cuff Size: Adult Regular)   Pulse 83   Temp 97.7  F (36.5  C) (Oral)   Resp 16   Ht 1.575 m (5' 2\")   Wt 59.7 kg (131 lb 9.6 oz)   LMP 01/27/2021 (Approximate)   SpO2 98%   BMI 24.07 kg/m    Body mass index is 24.07 kg/m .  Physical Exam   GENERAL: healthy, alert and no distress  EYES: Eyes grossly normal to inspection, PERRL and conjunctivae and sclerae normal  NECK: no adenopathy, no asymmetry, masses, or scars and thyroid normal to palpation  RESP: lungs clear to auscultation - no rales, rhonchi or wheezes  CV: regular rate and rhythm, normal S1 S2, no S3 or S4, no murmur, click or rub, no peripheral edema and peripheral pulses strong  SKIN: no suspicious lesions or rashes  PSYCH: mentation appears normal, affect normal/bright                "

## 2021-02-22 NOTE — NURSING NOTE
"Patient here for a follow up on lupus.  Vital signs:  Temp: 97.7  F (36.5  C) Temp src: Oral BP: 120/78 Pulse: 83   Resp: 16 SpO2: 98 %     Height: 157.5 cm (5' 2\") Weight: 59.7 kg (131 lb 9.6 oz)  Estimated body mass index is 24.07 kg/m  as calculated from the following:    Height as of this encounter: 1.575 m (5' 2\").    Weight as of this encounter: 59.7 kg (131 lb 9.6 oz).          "

## 2021-02-28 ENCOUNTER — HEALTH MAINTENANCE LETTER (OUTPATIENT)
Age: 21
End: 2021-02-28

## 2021-03-20 ENCOUNTER — IMMUNIZATION (OUTPATIENT)
Dept: NURSING | Facility: CLINIC | Age: 21
End: 2021-03-20
Payer: COMMERCIAL

## 2021-03-20 PROCEDURE — 91300 PR COVID VAC PFIZER DIL RECON 30 MCG/0.3 ML IM: CPT

## 2021-03-20 PROCEDURE — 0001A PR COVID VAC PFIZER DIL RECON 30 MCG/0.3 ML IM: CPT

## 2021-04-10 ENCOUNTER — IMMUNIZATION (OUTPATIENT)
Dept: NURSING | Facility: CLINIC | Age: 21
End: 2021-04-10
Attending: INTERNAL MEDICINE
Payer: COMMERCIAL

## 2021-04-10 PROCEDURE — 0002A PR COVID VAC PFIZER DIL RECON 30 MCG/0.3 ML IM: CPT

## 2021-04-10 PROCEDURE — 91300 PR COVID VAC PFIZER DIL RECON 30 MCG/0.3 ML IM: CPT

## 2021-06-17 ENCOUNTER — OFFICE VISIT (OUTPATIENT)
Dept: OBGYN | Facility: CLINIC | Age: 21
End: 2021-06-17
Payer: COMMERCIAL

## 2021-06-17 VITALS
SYSTOLIC BLOOD PRESSURE: 112 MMHG | HEIGHT: 62 IN | BODY MASS INDEX: 23.55 KG/M2 | WEIGHT: 128 LBS | DIASTOLIC BLOOD PRESSURE: 84 MMHG | HEART RATE: 98 BPM

## 2021-06-17 DIAGNOSIS — Z11.4 SCREENING FOR HIV (HUMAN IMMUNODEFICIENCY VIRUS): ICD-10-CM

## 2021-06-17 DIAGNOSIS — Z01.419 ENCOUNTER FOR GYNECOLOGICAL EXAMINATION WITHOUT ABNORMAL FINDING: Primary | ICD-10-CM

## 2021-06-17 DIAGNOSIS — N92.6 IRREGULAR PERIODS/MENSTRUAL CYCLES: ICD-10-CM

## 2021-06-17 DIAGNOSIS — Z11.8 SCREENING FOR CHLAMYDIAL DISEASE: ICD-10-CM

## 2021-06-17 DIAGNOSIS — Z11.3 SCREEN FOR STD (SEXUALLY TRANSMITTED DISEASE): ICD-10-CM

## 2021-06-17 PROCEDURE — 99385 PREV VISIT NEW AGE 18-39: CPT | Performed by: NURSE PRACTITIONER

## 2021-06-17 PROCEDURE — 87591 N.GONORRHOEAE DNA AMP PROB: CPT | Performed by: NURSE PRACTITIONER

## 2021-06-17 PROCEDURE — 86696 HERPES SIMPLEX TYPE 2 TEST: CPT | Performed by: NURSE PRACTITIONER

## 2021-06-17 PROCEDURE — 86695 HERPES SIMPLEX TYPE 1 TEST: CPT | Performed by: NURSE PRACTITIONER

## 2021-06-17 PROCEDURE — G0145 SCR C/V CYTO,THINLAYER,RESCR: HCPCS | Performed by: NURSE PRACTITIONER

## 2021-06-17 PROCEDURE — 87491 CHLMYD TRACH DNA AMP PROBE: CPT | Performed by: NURSE PRACTITIONER

## 2021-06-17 PROCEDURE — 86780 TREPONEMA PALLIDUM: CPT | Mod: 90 | Performed by: NURSE PRACTITIONER

## 2021-06-17 PROCEDURE — 87389 HIV-1 AG W/HIV-1&-2 AB AG IA: CPT | Performed by: NURSE PRACTITIONER

## 2021-06-17 PROCEDURE — 36415 COLL VENOUS BLD VENIPUNCTURE: CPT | Performed by: NURSE PRACTITIONER

## 2021-06-17 PROCEDURE — 99000 SPECIMEN HANDLING OFFICE-LAB: CPT | Performed by: NURSE PRACTITIONER

## 2021-06-17 RX ORDER — NORGESTIMATE AND ETHINYL ESTRADIOL 7DAYSX3 28
1 KIT ORAL DAILY
Qty: 84 TABLET | Refills: 1 | Status: SHIPPED | OUTPATIENT
Start: 2021-06-17 | End: 2022-05-10

## 2021-06-17 ASSESSMENT — PATIENT HEALTH QUESTIONNAIRE - PHQ9
SUM OF ALL RESPONSES TO PHQ QUESTIONS 1-9: 0
5. POOR APPETITE OR OVEREATING: NOT AT ALL

## 2021-06-17 ASSESSMENT — ANXIETY QUESTIONNAIRES
IF YOU CHECKED OFF ANY PROBLEMS ON THIS QUESTIONNAIRE, HOW DIFFICULT HAVE THESE PROBLEMS MADE IT FOR YOU TO DO YOUR WORK, TAKE CARE OF THINGS AT HOME, OR GET ALONG WITH OTHER PEOPLE: NOT DIFFICULT AT ALL
1. FEELING NERVOUS, ANXIOUS, OR ON EDGE: NOT AT ALL
3. WORRYING TOO MUCH ABOUT DIFFERENT THINGS: NOT AT ALL
GAD7 TOTAL SCORE: 0
6. BECOMING EASILY ANNOYED OR IRRITABLE: NOT AT ALL
7. FEELING AFRAID AS IF SOMETHING AWFUL MIGHT HAPPEN: NOT AT ALL
5. BEING SO RESTLESS THAT IT IS HARD TO SIT STILL: NOT AT ALL
2. NOT BEING ABLE TO STOP OR CONTROL WORRYING: NOT AT ALL

## 2021-06-17 ASSESSMENT — MIFFLIN-ST. JEOR: SCORE: 1285.91

## 2021-06-17 NOTE — PROGRESS NOTES
Cathy is a 21 year old No obstetric history on file. female who presents for annual exam.     Besides routine health maintenance,  she would like to discuss hormonal acne.    HPI: here for first annual exam.  She has been sexually active in the past, not right now.  She was diagnosed with lupus at age 14, admits did not take of herself at first, but now is off all meds for lupus and doing much better.  Cycles are 4-6 weeks apart, she does have hormonal acne also.  Seeing a dermatologist for that.  She was OCP's in past but was taken off , while on a lot of meds for lupus.  Interested in possibly going back on.      The patient's PCP is Jacey Fuchs MD.        GYNECOLOGIC HISTORY:    Patient's last menstrual period was 2021.    Regular menses? no, gets every month  Length of menses: 3-5 days    Her current contraception method is: not sexually active.  She  reports that she has never smoked. She has never used smokeless tobacco.    Patient is not sexually active.  STD testing offered?  Accepted  Last PHQ-9 score on record = No flowsheet data found.  Last GAD7 score on record = No flowsheet data found.  Alcohol Score = 1    HEALTH MAINTENANCE:  Cholesterol:   Recent Labs   Lab Test 18  0749   CHOL 182*   HDL 72   LDL 92   TRIG 92*     Last Mammo: Not applicable, Result: Not applicable, Next Mammo: Due at age 39  Pap: (No results found for: PAP )  Colonoscopy:  N/a, Result: Not applicable, Next Colonoscopy: Age 49.  Dexa:  N/a    Health maintenance updated:  yes    HEALTHY HABITS  Eating habits: follows a balanced nutrition diet  Calcium/Vitamin D intake: source:  dairy Adequate?   Exercise: How often do you exercise? 1-3 times/week;Walking and Cardio    HISTORY:  OB History    Para Term  AB Living   0 0 0 0 0 0   SAB TAB Ectopic Multiple Live Births   0 0 0 0 0       Patient Active Problem List   Diagnosis     Systemic lupus erythematosus (H)     Immunosuppression (H)     Lupus  "(systemic lupus erythematosus) (H)     Lupus nephritis, ISN/RPS class IV (H)     Hypertension     Past Surgical History:   Procedure Laterality Date     PERCUTANEOUS BIOPSY KIDNEY Right 10/6/2017    Procedure: PERCUTANEOUS BIOPSY KIDNEY;  Kidney Biopsy Percutaneous Right ;  Surgeon: Preston Russo MD;  Location: [facility] OR      Social History     Tobacco Use     Smoking status: Never Smoker     Smokeless tobacco: Never Used   Substance Use Topics     Alcohol use: Yes     Alcohol/week: 0.0 standard drinks     Comment: rare      Problem (# of Occurrences) Relation (Name,Age of Onset)    Diabetes (1) Maternal Aunt    Hypertension (1) Paternal Uncle    Thyroid Disease (1) Other: Cousin: hyperthyroid            Current Outpatient Medications   Medication Sig     norgestim-eth estrad triphasic (ORTHO TRI-CYCLEN) 0.18/0.215/0.25 MG-35 MCG tablet Take 1 tablet by mouth daily     No current facility-administered medications for this visit.      Allergies   Allergen Reactions     Penicillin G      Avoiding with diagnosis of lupus       Past medical, surgical, social and family histories were reviewed and updated in EPIC.    ROS:   12 point review of systems negative other than symptoms noted below or in the HPI.  Genitourinary: Irregular Menses  Skin: Acne      EXAM:  /84   Pulse 98   Ht 1.562 m (5' 1.5\")   Wt 58.1 kg (128 lb)   LMP 05/13/2021   Breastfeeding No   BMI 23.79 kg/m     BMI: Body mass index is 23.79 kg/m .    PHYSICAL EXAM:  Constitutional:   Appearance: Well nourished, well developed, alert, in no acute distress  Neck:  Lymph Nodes:  No lymphadenopathy present    Thyroid:  Gland size normal, nontender, no nodules or masses present  on palpation  Chest:  Respiratory Effort:  Breathing unlabored  Cardiovascular:    Heart: Auscultation:  Regular rate, normal rhythm, no murmurs present  Breasts: Inspection of Breasts:  No lymphadenopathy present., Palpation of Breasts and Axillae:  No masses present on " palpation, no breast tenderness., Axillary Lymph Nodes:  No lymphadenopathy present. and No nodularity, asymmetry or nipple discharge bilaterally. Does SBE  Gastrointestinal:   Abdominal Examination:  Abdomen nontender to palpation, tone normal without rigidity or guarding, no masses present, umbilicus without lesions   Liver and Spleen:  No hepatomegaly present, liver nontender to palpation    Hernias:  No hernias present  Lymphatic: Lymph Nodes:  No other lymphadenopathy present  Skin:  General Inspection:  No rashes present, no lesions present, no areas of  discoloration  Neurologic:    Mental Status:  Oriented X3.  Normal strength and tone, sensory exam                grossly normal, mentation intact and speech normal.    Psychiatric:   Mentation appears normal and affect normal/bright.         Pelvic Exam:  External Genitalia:     Normal appearance for age, no discharge present, no tenderness present, no inflammatory lesions present, color normal  Vagina:     Normal vaginal vault without central or paravaginal defects, no discharge present, no inflammatory lesions present, no masses present  Chlamydia/GC done  Bladder:     Nontender to palpation  Urethra:   Urethral Body:  Urethra palpation normal, urethra structural support normal   Urethral Meatus:  No erythema or lesions present  Cervix:     Appearance healthy, no lesions present, nontender to palpation, no bleeding present  Uterus:     Uterus: firm, normal sized and nontender, anteverted in position.   Adnexa:     No adnexal tenderness present, no adnexal masses present  Perineum:     Perineum within normal limits, no evidence of trauma, no rashes or skin lesions present  Anus:     Anus within normal limits, no hemorrhoids present  Inguinal Lymph Nodes:     No lymphadenopathy present  Pubic Hair:     Normal pubic hair distribution for age  Genitalia and Groin:     No rashes present, no lesions present, no areas of discoloration, no masses  present      COUNSELING:   Reviewed preventive health counseling, as reflected in patient instructions       Regular exercise       Healthy diet/nutrition       Contraception       Safe sex practices/STD prevention    BMI: Body mass index is 23.79 kg/m .      ASSESSMENT:  21 year old female with satisfactory annual exam.    ICD-10-CM    1. Encounter for gynecological examination without abnormal finding  Z01.419 Pap imaged thin layer screen only - recommended age 21 - 24 years   2. Screen for STD (sexually transmitted disease)  Z11.3 Neisseria gonorrhoeae PCR     Herpes Simplex Virus 1 and 2 IgG     Treponema Abs w Reflex to RPR and Titer   3. Screening for chlamydial disease  Z11.8 Chlamydia trachomatis PCR   4. Screening for HIV (human immunodeficiency virus)  Z11.4 HIV Antigen Antibody Combo   5. Irregular periods/menstrual cycles  N92.6 norgestim-eth estrad triphasic (ORTHO TRI-CYCLEN) 0.18/0.215/0.25 MG-35 MCG tablet       PLAN:  Normal Gyn exam.  Discussed methods, risks and benefits of birth control.  Patient would like to try OCP's again.  Will also further discuss with lupus physician to make sure okay to try OCP's again. Also discussed she could possibly have PCOS, pelvic US recommended at sometime.  Will notify patient with lab results when available.  Return 3 months for OCP and BP jackson.  Return 1 year for annual.  Pap every 3 years if normal.    SOPHIA Tracy CNP

## 2021-06-18 LAB
HIV 1+2 AB+HIV1 P24 AG SERPL QL IA: NONREACTIVE
T PALLIDUM AB SER QL: NONREACTIVE

## 2021-06-18 ASSESSMENT — ANXIETY QUESTIONNAIRES: GAD7 TOTAL SCORE: 0

## 2021-06-19 LAB
C TRACH DNA SPEC QL NAA+PROBE: NEGATIVE
N GONORRHOEA DNA SPEC QL NAA+PROBE: NEGATIVE
SPECIMEN SOURCE: NORMAL
SPECIMEN SOURCE: NORMAL

## 2021-06-21 LAB
HSV1 IGG SERPL QL IA: 0.2 AI (ref 0–0.8)
HSV2 IGG SERPL QL IA: <0.2 AI (ref 0–0.8)

## 2021-06-22 ENCOUNTER — PATIENT OUTREACH (OUTPATIENT)
Dept: OBGYN | Facility: CLINIC | Age: 21
End: 2021-06-22

## 2021-06-22 LAB
COPATH REPORT: NORMAL
PAP: NORMAL

## 2021-06-22 NOTE — RESULT ENCOUNTER NOTE
Cathy      Your STD results are all negative.  If further questions please give me a call.    Tiera Marshall RNC

## 2021-06-24 NOTE — TELEPHONE ENCOUNTER
NOTES Status Details   OFFICE NOTE from referring provider Internal 02.22.2021 Carina Davis NP   OFFICE NOTE from other specialist Internal 01.22.2020 Jacey Fuchs MD    08.15.2018 Lio Jo MD    04.03.2018 Osvaldo Block MD   DISCHARGE SUMMARY from hospital N/A    DISCHARGE REPORT from the ER N/A    MEDICATION LIST Care Everywhere /Internal    LABS (Any and all labs)      Care Everywhere /Internal    Biopsy/pathology (Anything related to diagnoses I.e. fluid aspirations, lip biopsy, muscle biopsy)      N/A     N/A    Imaging (All imaging related to diagnoses)     Echo N/A    HRCT N/A    CXR N/A    EMG N/A                   Scleroderma/Dermatomyositis diagnoses     Previous Cardiology notes  N/A    Previous Pulmonary notes N/A    Previous Dermatology notes N/A    Previous GI notes N/A    Lupus diagnoses     Previous Nephrology notes N/A    Previous Dermatology notes N/A    Previous Cardiology notes N/A

## 2021-06-25 ENCOUNTER — PRE VISIT (OUTPATIENT)
Dept: RHEUMATOLOGY | Facility: CLINIC | Age: 21
End: 2021-06-25

## 2021-06-25 ENCOUNTER — VIRTUAL VISIT (OUTPATIENT)
Dept: RHEUMATOLOGY | Facility: CLINIC | Age: 21
End: 2021-06-25
Attending: NURSE PRACTITIONER
Payer: COMMERCIAL

## 2021-06-25 VITALS — HEIGHT: 62 IN | BODY MASS INDEX: 23.55 KG/M2 | WEIGHT: 128 LBS

## 2021-06-25 DIAGNOSIS — M32.9 SYSTEMIC LUPUS ERYTHEMATOSUS, UNSPECIFIED SLE TYPE, UNSPECIFIED ORGAN INVOLVEMENT STATUS (H): ICD-10-CM

## 2021-06-25 DIAGNOSIS — E55.9 VITAMIN D DEFICIENCY: Primary | ICD-10-CM

## 2021-06-25 PROCEDURE — 99204 OFFICE O/P NEW MOD 45 MIN: CPT | Mod: 95 | Performed by: INTERNAL MEDICINE

## 2021-06-25 ASSESSMENT — MIFFLIN-ST. JEOR: SCORE: 1285.91

## 2021-06-25 NOTE — LETTER
"6/25/2021       RE: Cathy Freedman  [address] [facility] [id number]     Dear Colleague,    Thank you for referring your patient, Cathy Freedman, to the [facility] RHEUMATOLOGY [facility]IN[facility] [place] at [facility]. Please see a copy of my visit note below.    Chief Complaint   Patient presents with     RECHECK     Height 1.562 m (5' 1.5\"), weight 58.1 kg (128 lb), not currently breastfeeding.    Cathy is a 21 year old who is being evaluated via a billable video visit.      How would you like to obtain your AVS? MyChart  If the video visit is dropped, the invitation should be resent by: Other e-mail: use [email or web address] [phone]  Will anyone else be joining your video visit? No      Video Start Time: 1:11 PM  Video-Visit Details    Type of service:  Video Visit    Video End Time:1:48 PM    Originating Location (pt. Location): Home    Distant Location (provider location):  [facility]HEUMATOLOG[facility] [place]     Platform used for Video Visit: [employer]     Date of visit: 6/25/2021    Reason for visit: SLE, transfer care from ped to adult rheumatology    Patient Active Problem List   Diagnosis     Systemic lupus erythematosus (H)     Immunosuppression (H)     Lupus (systemic lupus erythematosus) (H)     Lupus nephritis, ISN/RPS class IV (H)     Hypertension     Screening for cervical cancer          Subjective:       HPI from last visit with ped rheumatology 1/22/2020:     Cathy is a 19 year old female who was seen in Pediatric Rheumatology clinic today for a follow-up visit of SLE. Cathy is accompanied today by both parents.  Cathy was last seen in clinic on 8/15/2018 by Dr. Lio Jo, at which time she was not taking all of her medications as prescribed but overall did not have any major concerns.  She was lost to follow-up there after, despite multiple attempts we could not get her to return calls or follow-up.  Today she tells me " that shortly after that visit she continued to take some of her medications intermittently and is likely out of her medications by the winter 2019.  Approximately 1 to 1-1/2 years ago.  Since then she has felt increasingly well.  She denies any problems with lupus related symptoms such as fever rash joint pain leg swelling.  Her 14 system review as noted below is negative    She was worried increasingly that she may be having some active lupus and just not be aware and decided she better come back for a follow-up visit.  By her she was on the schedule I asked her to repeat her labs a few days before, those are listed below and are remarkably normal!        Today 6/25/2021: She was last seen by ped rheumatology in 1/2020, she was doing well off meds back then. She was not seen since then given COVID pandemic. She thinks she is doing really good. Few weeks ago, had some joint pain but she was working a lot.    No skin redness, being sluggish or fatigue anymore.    No hair loss.    Has some chronic hair loss over her temporal area.    Gets red from sun exposure, sun burn hurts more but no rashes. Uses sunscreen.    No oral ulcers.    No Joint pain.    No CP, SOB, cough or fevers.    No dry eyes, dry mouth.    No Raynaud's.    No depression.    She wants to know if her internal organs are good or not, likes to do labs.    Has been prescribed OC to control hormonal acne and menstrual cycle, not sexually active. Occasionally drinks wine with her mom. 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         ROS: A comprehensive ROS was done. Positives are per HPI.      Past Medical History:   Diagnosis Date     Anemia of chronic disease      Anxious appearance 1/3/2018     Long term systemic steroid user 10/17/2017     Lupus nephritis, ISN/RPS class IV (H)      Non-adherence to medical treatment      Psychosis (H) 1/2/2018     Renal hypertension      Skin breakdown 11/13/2017     SLE (systemic lupus erythematosus  related syndrome) (H)        Past Surgical History:   Procedure Laterality Date     PERCUTANEOUS BIOPSY KIDNEY Right 10/6/2017    Procedure: PERCUTANEOUS BIOPSY KIDNEY;  Kidney Biopsy Percutaneous Right ;  Surgeon: Preston Russo MD;  Location: [facility] OR       Family History   Problem Relation Age of Onset     Thyroid Disease Other         Cousin: hyperthyroid     Hypertension Paternal Uncle      Diabetes Maternal Aunt        Social History     Tobacco Use     Smoking status: Never Smoker     Smokeless tobacco: Never Used   Substance Use Topics     Alcohol use: Yes     Alcohol/week: 0.0 standard drinks     Comment: rare         Allergies:     Allergies   Allergen Reactions     Penicillin G      Avoiding with diagnosis of lupus          Medications:     None       Medical --  Family -- Social History:     Past Medical History:   Diagnosis Date     Anemia of chronic disease      Anxious appearance 1/3/2018     Long term systemic steroid user 10/17/2017     Lupus nephritis, ISN/RPS class IV (H)      Non-adherence to medical treatment      Psychosis (H) 1/2/2018     Renal hypertension      Skin breakdown 11/13/2017     SLE (systemic lupus erythematosus related syndrome) (H)      Past Surgical History:   Procedure Laterality Date     PERCUTANEOUS BIOPSY KIDNEY Right 10/6/2017    Procedure: PERCUTANEOUS BIOPSY KIDNEY;  Kidney Biopsy Percutaneous Right ;  Surgeon: Preston Russo MD;  Location: [facility] OR     Family History   Problem Relation Age of Onset     Thyroid Disease Other         Cousin: hyperthyroid     Hypertension Paternal Uncle      Diabetes Maternal Aunt      Social History     Social History Narrative    Family History     Father Eliseo: Occupation [employer]      Mother Lupe: Occupation      Brother Hakan 07/11/2007: History is negative     Sister Elizabeth 06/11/2000: History is negative     Pat Sister Latasha 09/24/1985: History is negative     Pat Sister Shira 05/15/1988: History  is negative     Mat Grandfather: Anemia     Family History: Autoimmune disorder Cousin        Social History     Home/Environment    Lives with: Father, Mother, Siblings. Living situation: Home.        /School/Work    Grade level: She graduated from high school in 2017.  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 The cardiac  silhouette size and pulmonary vasculature are within normal limits.  There is no significant pleural effusion or pneumothorax. There are no  focal pulmonary opacities. The visualized upper abdomen and bones  appear normal.      Impression    IMPRESSION: Clear lungs.    SALEEM BECERRA MD          Last Lab Results:     No visits with results within 2 Day(s) from this visit.   Latest known visit with results is:   Hospital Outpatient Visit on 01/20/2020   Component Date Value     Sed Rate 01/20/2020 6      Complement C4 01/20/2020 19      Complement C3 01/20/2020 107      Sodium 01/20/2020 138      Potassium 01/20/2020 3.7      Chloride 01/20/2020 108      Carbon Dioxide 01/20/2020 25      Anion Gap 01/20/2020 5      Glucose 01/20/2020 79      Urea Nitrogen 01/20/2020 12      Creatinine 01/20/2020 0.74      GFR Estimate 01/20/2020 >90      GFR Estimate If Black 01/20/2020 >90      Calcium 01/20/2020 9.2      Bilirubin Total 01/20/2020 0.5      Albumin 01/20/2020 3.6      Protein Total 01/20/2020 7.4      Alkaline  Phosphatase 01/20/2020 68      ALT 01/20/2020 19      AST 01/20/2020 9      DNA-ds 01/20/2020 33*     WBC 01/20/2020 8.0      RBC Count 01/20/2020 5.31*     Hemoglobin 01/20/2020 14.9      Hematocrit 01/20/2020 46.6      MCV 01/20/2020 88      MCH 01/20/2020 28.1      MCHC 01/20/2020 32.0      RDW 01/20/2020 14.1      Platelet Count 01/20/2020 239      Diff Method 01/20/2020 Automated Method      % Neutrophils 01/20/2020 45.6      % Lymphocytes 01/20/2020 41.2      % Monocytes 01/20/2020 5.5      % Eosinophils 01/20/2020 7.0      % Basophils 01/20/2020 0.6      % Immature Granulocytes 01/20/2020 0.1      Nucleated RBCs 01/20/2020 0      Absolute Neutrophil 01/20/2020 3.7      Absolute Lymphocytes 01/20/2020 3.3      Absolute Monocytes 01/20/2020 0.4      Absolute Eosinophils 01/20/2020 0.6      Absolute Basophils 01/20/2020 0.1      Abs Immature Granulocytes 01/20/2020 0.0      Absolute Nucleated RBC 01/20/2020 0.0      Protein Random Urine 01/20/2020 0.41      Protein Total Urine g/gr* 01/20/2020 0.21*     Color Urine 01/20/2020 Yellow      Appearance Urine 01/20/2020 Clear      Glucose Urine 01/20/2020 Negative      Bilirubin Urine 01/20/2020 Negative      Ketones Urine 01/20/2020 Negative      Specific Gravity Urine 01/20/2020 1.026      Blood Urine 01/20/2020 Negative      pH Urine 01/20/2020 6.0      Protein Albumin Urine 01/20/2020 50*     Urobilinogen mg/dL 01/20/2020 Normal      Nitrite Urine 01/20/2020 Negative      Leukocyte Esterase Urine 01/20/2020 Negative      Source 01/20/2020 Midstream Urine      WBC Urine 01/20/2020 1      RBC Urine 01/20/2020 1      Squamous Epithelial /HPF* 01/20/2020 4*     Mucous Urine 01/20/2020 Present*     Creatinine Urine 01/20/2020 197           Assessment :   Systemic lupus erythematosus, unspecified SLE type, unspecified organ involvement status (H)    Cathy is a 21-year-old F with a history of quite severe systemic lupus erythematosus  But now her SLE is quiet off meds.  Last seen by ped rheumatology in 1/2020, transferring care to me today. No complaints. Doing well off meds. Will check labs.          Recommendations and follow-up:     Labs    Return in 6 months (in person)      Alberto Galvez MD      Orders Placed This Encounter   Procedures     ALT     Albumin level     AST     CBC with platelets differential     Creatinine     Complement C4     Complement C3     CRP inflammation     DNA double stranded antibodies     Erythrocyte sedimentation rate auto     UA with Microscopic reflex to Culture     Protein  random urine with Creat Ratio     Creatinine random urine     Vitamin D Deficiency     Lupus Anticoagulant Panel     Cardiolipin Martha IgG and IgM     Beta 2 Glycoprotein Antibodies IGG IGM     Cardiolipin Antibody IgA     Beta 2 Glycoprotein 1 Antibody IgA

## 2021-06-25 NOTE — PROGRESS NOTES
"Chief Complaint   Patient presents with     RECHECK     Height 1.562 m (5' 1.5\"), weight 58.1 kg (128 lb), not currently breastfeeding.    Cathy is a 21 year old who is being evaluated via a billable video visit.      How would you like to obtain your AVS? MyChart  If the video visit is dropped, the invitation should be resent by: Other e-mail: use [employer] [phone]  Will anyone else be joining your video visit? No      Video Start Time: 1:11 PM  Video-Visit Details    Type of service:  Video Visit    Video End Time:1:48 PM    Originating Location (pt. Location): Home    Distant Location (provider location):  [facility]HEUMATOLOGY [facility] [place]     Platform used for Video Visit: [employer]     Date of visit: 6/25/2021    Reason for visit: SLE, transfer care from ped to adult rheumatology    Patient Active Problem List   Diagnosis     Systemic lupus erythematosus (H)     Immunosuppression (H)     Lupus (systemic lupus erythematosus) (H)     Lupus nephritis, ISN/RPS class IV (H)     Hypertension     Screening for cervical cancer          Subjective:       HPI from last visit with ped rheumatology 1/22/2020:     Cathy is a 19 year old female who was seen in Pediatric Rheumatology clinic today for a follow-up visit of SLE. Cathy is accompanied today by both parents.  Cathy was last seen in clinic on 8/15/2018 by Dr. Lio Jo, at which time she was not taking all of her medications as prescribed but overall did not have any major concerns.  She was lost to follow-up there after, despite multiple attempts we could not get her to return calls or follow-up.  Today she tells me that shortly after that visit she continued to take some of her medications intermittently and is likely out of her medications by the winter 2019.  Approximately 1 to 1-1/2 years ago.  Since then she has felt increasingly well.  She denies any problems with lupus related symptoms such as fever rash joint pain leg swelling.  Her " 14 system review as noted below is negative    She was worried increasingly that she may be having some active lupus and just not be aware and decided she better come back for a follow-up visit.  By her she was on the schedule I asked her to repeat her labs a few days before, those are listed below and are remarkably normal!        Today 6/25/2021: She was last seen by ped rheumatology in 1/2020, she was doing well off meds back then. She was not seen since then given COVID pandemic. She thinks she is doing really good. Few weeks ago, had some joint pain but she was working a lot.    No skin redness, being sluggish or fatigue anymore.    No hair loss.    Has some chronic hair loss over her temporal area.    Gets red from sun exposure, sun burn hurts more but no rashes. Uses sunscreen.    No oral ulcers.    No Joint pain.    No CP, SOB, cough or fevers.    No dry eyes, dry mouth.    No Raynaud's.    No depression.    She wants to know if her internal organs are good or not, likes to do labs.    Has been prescribed OC to control hormonal acne and menstrual cycle, not sexually active. Occasionally drinks wine with her mom. 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         ROS: A comprehensive ROS was done. Positives are per HPI.      Past Medical History:   Diagnosis Date     Anemia of chronic disease      Anxious appearance 1/3/2018     Long term systemic steroid user 10/17/2017     Lupus nephritis, ISN/RPS class IV (H)      Non-adherence to medical treatment      Psychosis (H) 1/2/2018     Renal hypertension      Skin breakdown 11/13/2017     SLE (systemic lupus erythematosus related syndrome) (H)        Past Surgical History:   Procedure Laterality Date     PERCUTANEOUS BIOPSY KIDNEY Right 10/6/2017    Procedure: PERCUTANEOUS BIOPSY KIDNEY;  Kidney Biopsy Percutaneous Right ;  Surgeon: Preston Russo MD;  Location: [facility] OR       Family History   Problem Relation Age of Onset     Thyroid Disease Other          Cousin: hyperthyroid     Hypertension Paternal Uncle      Diabetes Maternal Aunt        Social History     Tobacco Use     Smoking status: Never Smoker     Smokeless tobacco: Never Used   Substance Use Topics     Alcohol use: Yes     Alcohol/week: 0.0 standard drinks     Comment: rare         Allergies:     Allergies   Allergen Reactions     Penicillin G      Avoiding with diagnosis of lupus          Medications:     None       Medical --  Family -- Social History:     Past Medical History:   Diagnosis Date     Anemia of chronic disease      Anxious appearance 1/3/2018     Long term systemic steroid user 10/17/2017     Lupus nephritis, ISN/RPS class IV (H)      Non-adherence to medical treatment      Psychosis (H) 1/2/2018     Renal hypertension      Skin breakdown 11/13/2017     SLE (systemic lupus erythematosus related syndrome) (H)      Past Surgical History:   Procedure Laterality Date     PERCUTANEOUS BIOPSY KIDNEY Right 10/6/2017    Procedure: PERCUTANEOUS BIOPSY KIDNEY;  Kidney Biopsy Percutaneous Right ;  Surgeon: Preston Russo MD;  Location: [facility] OR     Family History   Problem Relation Age of Onset     Thyroid Disease Other         Cousin: hyperthyroid     Hypertension Paternal Uncle      Diabetes Maternal Aunt      Social History     Social History Narrative    Family History     Father Eliseo: Occupation [employer]      Mother Andrade: Occupation      Brother Hakan 07/11/2007: History is negative     Sister Elizabeth 06/11/2000: History is negative     Pat Sister Latasha 09/24/1985: History is negative     Pat Sister Shira 05/15/1988: History is negative     Mat Grandfather: Anemia     Family History: Autoimmune disorder Cousin        Social History     Home/Environment    Lives with: Father, Mother, Siblings. Living situation: Home.        /School/Work    Grade level: She graduated from high school in 2017.  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 The cardiac  silhouette size and pulmonary vasculature are within normal limits.  There is no significant pleural effusion or pneumothorax. There are no  focal pulmonary opacities. The visualized upper abdomen and bones  appear normal.      Impression    IMPRESSION: Clear lungs.    SALEEM BECERRA MD          Last Lab Results:     No visits with results within 2 Day(s) from this visit.   Latest known visit with results is:   Hospital Outpatient Visit on 01/20/2020   Component Date Value     Sed Rate 01/20/2020 6      Complement C4 01/20/2020 19      Complement C3 01/20/2020 107      Sodium 01/20/2020 138      Potassium 01/20/2020 3.7      Chloride 01/20/2020 108      Carbon Dioxide 01/20/2020 25      Anion Gap 01/20/2020 5      Glucose 01/20/2020 79      Urea Nitrogen 01/20/2020 12      Creatinine 01/20/2020 0.74      GFR Estimate 01/20/2020 >90      GFR Estimate If Black 01/20/2020 >90      Calcium 01/20/2020 9.2      Bilirubin Total 01/20/2020 0.5      Albumin 01/20/2020 3.6      Protein Total 01/20/2020 7.4      Alkaline Phosphatase 01/20/2020 68      ALT 01/20/2020 19      AST 01/20/2020 9      DNA-ds 01/20/2020 33*     WBC 01/20/2020 8.0      RBC Count 01/20/2020 5.31*     Hemoglobin 01/20/2020 14.9      Hematocrit 01/20/2020 46.6      MCV 01/20/2020 88      MCH 01/20/2020 28.1      MCHC 01/20/2020 32.0      RDW 01/20/2020 14.1      Platelet Count  01/20/2020 239      Diff Method 01/20/2020 Automated Method      % Neutrophils 01/20/2020 45.6      % Lymphocytes 01/20/2020 41.2      % Monocytes 01/20/2020 5.5      % Eosinophils 01/20/2020 7.0      % Basophils 01/20/2020 0.6      % Immature Granulocytes 01/20/2020 0.1      Nucleated RBCs 01/20/2020 0      Absolute Neutrophil 01/20/2020 3.7      Absolute Lymphocytes 01/20/2020 3.3      Absolute Monocytes 01/20/2020 0.4      Absolute Eosinophils 01/20/2020 0.6      Absolute Basophils 01/20/2020 0.1      Abs Immature Granulocytes 01/20/2020 0.0      Absolute Nucleated RBC 01/20/2020 0.0      Protein Random Urine 01/20/2020 0.41      Protein Total Urine g/gr* 01/20/2020 0.21*     Color Urine 01/20/2020 Yellow      Appearance Urine 01/20/2020 Clear      Glucose Urine 01/20/2020 Negative      Bilirubin Urine 01/20/2020 Negative      Ketones Urine 01/20/2020 Negative      Specific Gravity Urine 01/20/2020 1.026      Blood Urine 01/20/2020 Negative      pH Urine 01/20/2020 6.0      Protein Albumin Urine 01/20/2020 50*     Urobilinogen mg/dL 01/20/2020 Normal      Nitrite Urine 01/20/2020 Negative      Leukocyte Esterase Urine 01/20/2020 Negative      Source 01/20/2020 Midstream Urine      WBC Urine 01/20/2020 1      RBC Urine 01/20/2020 1      Squamous Epithelial /HPF* 01/20/2020 4*     Mucous Urine 01/20/2020 Present*     Creatinine Urine 01/20/2020 197           Assessment :   Systemic lupus erythematosus, unspecified SLE type, unspecified organ involvement status (H)    Cathy is a 21-year-old F with a history of quite severe systemic lupus erythematosus  But now her SLE is quiet off meds. Last seen by ped rheumatology in 1/2020, transferring care to me today. No complaints. Doing well off meds. Will check labs.          Recommendations and follow-up:     Labs    Return in 6 months (in person)      Alberto Galvez MD      Orders Placed This Encounter   Procedures     ALT     Albumin level     AST     CBC with  platelets differential     Creatinine     Complement C4     Complement C3     CRP inflammation     DNA double stranded antibodies     Erythrocyte sedimentation rate auto     UA with Microscopic reflex to Culture     Protein  random urine with Creat Ratio     Creatinine random urine     Vitamin D Deficiency     Lupus Anticoagulant Panel     Cardiolipin Martha IgG and IgM     Beta 2 Glycoprotein Antibodies IGG IGM     Cardiolipin Antibody IgA     Beta 2 Glycoprotein 1 Antibody IgA

## 2021-07-09 DIAGNOSIS — E55.9 VITAMIN D DEFICIENCY: ICD-10-CM

## 2021-07-09 DIAGNOSIS — M32.9 SYSTEMIC LUPUS ERYTHEMATOSUS, UNSPECIFIED SLE TYPE, UNSPECIFIED ORGAN INVOLVEMENT STATUS (H): ICD-10-CM

## 2021-07-09 LAB
ALBUMIN UR-MCNC: 30 MG/DL
APPEARANCE UR: CLEAR
BASOPHILS # BLD AUTO: 0 10E9/L (ref 0–0.2)
BASOPHILS NFR BLD AUTO: 0.4 %
BILIRUB UR QL STRIP: NEGATIVE
COLOR UR AUTO: YELLOW
CREAT UR-MCNC: 195 MG/DL
CREAT UR-MCNC: 195 MG/DL
CRP SERPL-MCNC: <2.9 MG/L (ref 0–8)
DIFFERENTIAL METHOD BLD: NORMAL
EOSINOPHIL # BLD AUTO: 0.4 10E9/L (ref 0–0.7)
EOSINOPHIL NFR BLD AUTO: 4.5 %
ERYTHROCYTE [DISTWIDTH] IN BLOOD BY AUTOMATED COUNT: 14.2 % (ref 10–15)
ERYTHROCYTE [SEDIMENTATION RATE] IN BLOOD BY WESTERGREN METHOD: 9 MM/H (ref 0–20)
GLUCOSE UR STRIP-MCNC: NEGATIVE MG/DL
HCT VFR BLD AUTO: 41.8 % (ref 35–47)
HGB BLD-MCNC: 13.8 G/DL (ref 11.7–15.7)
HGB UR QL STRIP: NEGATIVE
KETONES UR STRIP-MCNC: ABNORMAL MG/DL
LEUKOCYTE ESTERASE UR QL STRIP: NEGATIVE
LYMPHOCYTES # BLD AUTO: 2.9 10E9/L (ref 0.8–5.3)
LYMPHOCYTES NFR BLD AUTO: 31.5 %
MCH RBC QN AUTO: 28.8 PG (ref 26.5–33)
MCHC RBC AUTO-ENTMCNC: 33 G/DL (ref 31.5–36.5)
MCV RBC AUTO: 87 FL (ref 78–100)
MONOCYTES # BLD AUTO: 0.6 10E9/L (ref 0–1.3)
MONOCYTES NFR BLD AUTO: 6.4 %
NEUTROPHILS # BLD AUTO: 5.3 10E9/L (ref 1.6–8.3)
NEUTROPHILS NFR BLD AUTO: 57.2 %
NITRATE UR QL: NEGATIVE
PH UR STRIP: 5.5 PH (ref 5–7)
PLATELET # BLD AUTO: 196 10E9/L (ref 150–450)
PROT UR-MCNC: 0.25 G/L
PROT/CREAT 24H UR: 0.13 G/G CR (ref 0–0.2)
RBC # BLD AUTO: 4.8 10E12/L (ref 3.8–5.2)
RBC #/AREA URNS AUTO: ABNORMAL /HPF
SOURCE: ABNORMAL
SP GR UR STRIP: >1.03 (ref 1–1.03)
UROBILINOGEN UR STRIP-ACNC: 0.2 EU/DL (ref 0.2–1)
WBC # BLD AUTO: 9.2 10E9/L (ref 4–11)
WBC #/AREA URNS AUTO: ABNORMAL /HPF

## 2021-07-09 PROCEDURE — 86225 DNA ANTIBODY NATIVE: CPT | Performed by: INTERNAL MEDICINE

## 2021-07-09 PROCEDURE — 85730 THROMBOPLASTIN TIME PARTIAL: CPT | Performed by: INTERNAL MEDICINE

## 2021-07-09 PROCEDURE — 82040 ASSAY OF SERUM ALBUMIN: CPT | Performed by: INTERNAL MEDICINE

## 2021-07-09 PROCEDURE — 86160 COMPLEMENT ANTIGEN: CPT | Performed by: INTERNAL MEDICINE

## 2021-07-09 PROCEDURE — 99N1035 PR STATISTIC THROMBIN TIME NC: Performed by: INTERNAL MEDICINE

## 2021-07-09 PROCEDURE — 86147 CARDIOLIPIN ANTIBODY EA IG: CPT | Performed by: INTERNAL MEDICINE

## 2021-07-09 PROCEDURE — 84450 TRANSFERASE (AST) (SGOT): CPT | Performed by: INTERNAL MEDICINE

## 2021-07-09 PROCEDURE — 84156 ASSAY OF PROTEIN URINE: CPT | Performed by: INTERNAL MEDICINE

## 2021-07-09 PROCEDURE — 82306 VITAMIN D 25 HYDROXY: CPT | Performed by: INTERNAL MEDICINE

## 2021-07-09 PROCEDURE — 84460 ALANINE AMINO (ALT) (SGPT): CPT | Performed by: INTERNAL MEDICINE

## 2021-07-09 PROCEDURE — 36415 COLL VENOUS BLD VENIPUNCTURE: CPT | Performed by: INTERNAL MEDICINE

## 2021-07-09 PROCEDURE — 99N1023 PR STATISTIC INR NC: Performed by: INTERNAL MEDICINE

## 2021-07-09 PROCEDURE — 82565 ASSAY OF CREATININE: CPT | Performed by: INTERNAL MEDICINE

## 2021-07-09 PROCEDURE — 86140 C-REACTIVE PROTEIN: CPT | Performed by: INTERNAL MEDICINE

## 2021-07-09 PROCEDURE — 81001 URINALYSIS AUTO W/SCOPE: CPT | Performed by: INTERNAL MEDICINE

## 2021-07-09 PROCEDURE — 86146 BETA-2 GLYCOPROTEIN ANTIBODY: CPT | Performed by: INTERNAL MEDICINE

## 2021-07-09 PROCEDURE — 85652 RBC SED RATE AUTOMATED: CPT | Performed by: INTERNAL MEDICINE

## 2021-07-09 PROCEDURE — 85025 COMPLETE CBC W/AUTO DIFF WBC: CPT | Performed by: INTERNAL MEDICINE

## 2021-07-09 PROCEDURE — 85613 RUSSELL VIPER VENOM DILUTED: CPT | Performed by: INTERNAL MEDICINE

## 2021-07-10 LAB
ALBUMIN SERPL-MCNC: 3.4 G/DL (ref 3.4–5)
ALT SERPL W P-5'-P-CCNC: 19 U/L (ref 0–50)
AST SERPL W P-5'-P-CCNC: 11 U/L (ref 0–45)
CREAT SERPL-MCNC: 0.94 MG/DL (ref 0.52–1.04)
DEPRECATED CALCIDIOL+CALCIFEROL SERPL-MC: 29 UG/L (ref 20–75)
GFR SERPL CREATININE-BSD FRML MDRD: 86 ML/MIN/{1.73_M2}

## 2021-07-12 LAB
B2 GLYCOPROT1 IGA SER-ACNC: 3.1 U/ML
B2 GLYCOPROT1 IGG SERPL IA-ACNC: 0.9 U/ML
B2 GLYCOPROT1 IGM SERPL IA-ACNC: <2.9 U/ML
C3 SERPL-MCNC: 96 MG/DL (ref 81–157)
C4 SERPL-MCNC: 15 MG/DL (ref 13–39)
CARDIOLIPIN ANTIBODY IGG: 2.2 GPL-U/ML (ref 0–19.9)
CARDIOLIPIN ANTIBODY IGM: 1.1 MPL-U/ML (ref 0–19.9)
CARDIOLIPIN IGA SERPL-ACNC: 4.9 APL U/ML (ref 0–19.9)
DSDNA AB SER-ACNC: 44 IU/ML
LA PPP-IMP: NEGATIVE

## 2021-08-01 DIAGNOSIS — E55.9 VITAMIN D DEFICIENCY: Primary | ICD-10-CM

## 2021-08-01 RX ORDER — ERGOCALCIFEROL 1.25 MG/1
50000 CAPSULE, LIQUID FILLED ORAL
Qty: 12 CAPSULE | Refills: 0 | Status: SHIPPED | OUTPATIENT
Start: 2021-08-01 | End: 2022-06-28

## 2021-09-03 NOTE — PROGRESS NOTES
SUBJECTIVE:                                                   Cathy Freedman is a 21 year old female who presents to clinic today for the following health issue(s):  Patient presents with:  Recheck Medication: 3 month f/u to birth control- states cycles are more consistent/regular with timing, however was a little more emotional in beginning and cycles are a little heavier now too      HPI:  Patient is here for OCP jackson.  She is on ortho tri cyclen for last 3 months.  She states people have told her she has been moodier and her cycles are a little heavier now.  Her acne has cleared.  Her Lupus is doing well, she is only on vitamin D right now.      Patient's last menstrual period was 09/15/2021 (exact date)..     Patient is not sexually active, .  Using not sexually active for contraception.    reports that she has never smoked. She has never used smokeless tobacco.    STD testing offered?  Declined    Health maintenance updated:  yes    Today's PHQ-2 Score:   PHQ-2 (  [employer]) 2021   Q1: Little interest or pleasure in doing things 0   Q2: Feeling down, depressed or hopeless 0   PHQ-2 Score 0     Today's PHQ-9 Score:   PHQ-9 SCORE 2021   PHQ-9 Total Score 0   Some encounter information is confidential and restricted. Go to Review Flowsheets activity to see all data.     Today's ULYSSES-7 Score:   ULYSSES-7 SCORE 2021   Total Score 0       Problem list and histories reviewed & adjusted, as indicated.  Additional history: as documented.    Patient Active Problem List   Diagnosis     Systemic lupus erythematosus (H)     Immunosuppression (H)     Lupus (systemic lupus erythematosus) (H)     Lupus nephritis, ISN/RPS class IV (H)     Hypertension     Screening for cervical cancer     Past Surgical History:   Procedure Laterality Date     PERCUTANEOUS BIOPSY KIDNEY Right 10/6/2017    Procedure: PERCUTANEOUS BIOPSY KIDNEY;  Kidney Biopsy Percutaneous Right ;  Surgeon: Preston Russo MD;  Location:   "OR      Social History     Tobacco Use     Smoking status: Never Smoker     Smokeless tobacco: Never Used   Substance Use Topics     Alcohol use: Yes     Alcohol/week: 0.0 standard drinks     Comment: rare      Problem (# of Occurrences) Relation (Name,Age of Onset)    Diabetes (1) Maternal Aunt    Hypertension (1) Paternal Uncle    No Known Problems (8) Mother, Father, Sister, Brother, Maternal Grandmother, Maternal Grandfather, Paternal Grandmother, Paternal Grandfather    Thyroid Disease (1) Other: Cousin: hyperthyroid            Current Outpatient Medications   Medication Sig     levonorgestrel-ethinyl estradiol (AVIANE) 0.1-20 MG-MCG tablet Take 1 tablet by mouth daily     norgestim-eth estrad triphasic (ORTHO TRI-CYCLEN) 0.18/0.215/0.25 MG-35 MCG tablet Take 1 tablet by mouth daily     vitamin D2 (ERGOCALCIFEROL) 71730 units (1250 mcg) capsule Take 1 capsule (50,000 Units) by mouth every 7 days     No current facility-administered medications for this visit.     Allergies   Allergen Reactions     Penicillin G      Avoiding with diagnosis of lupus       ROS:  12 point review of systems negative other than symptoms noted below or in the HPI.  Genitourinary: Heavy Bleeding with Period  Psychiatric: Johnson  Normal menstrual cycles      OBJECTIVE:     /82   Pulse 74   Ht 1.562 m (5' 1.5\")   Wt 61.2 kg (135 lb)   LMP 09/15/2021 (Exact Date)   BMI 25.09 kg/m    Body mass index is 25.09 kg/m .    Exam:  Constitutional:  Appearance: Well nourished, well developed alert, in no acute distress  Neurologic:  Mental Status:  Oriented X3.  Normal strength and tone, sensory exam grossly normal, mentation intact and speech normal.    Psychiatric:  Mentation appears normal and affect normal/bright.     In-Clinic Test Results:  No results found for this or any previous visit (from the past 24 hour(s)).    ASSESSMENT/PLAN:                                                        ICD-10-CM    1. Irregular periods/menstrual " cycles  N92.6 levonorgestrel-ethinyl estradiol (AVIANE) 0.1-20 MG-MCG tablet         Discussed changing OCP's to lower estrogen level due to moodiness, patient would like to see if that will help and lighten cycles .  Will start aviane with next cycle.  Patient to call with update.  Return prn.    SOPHIA Tracy CNP [facility] [place]

## 2021-09-16 ENCOUNTER — OFFICE VISIT (OUTPATIENT)
Dept: OBGYN | Facility: CLINIC | Age: 21
End: 2021-09-16
Payer: COMMERCIAL

## 2021-09-16 VITALS
BODY MASS INDEX: 24.84 KG/M2 | WEIGHT: 135 LBS | DIASTOLIC BLOOD PRESSURE: 82 MMHG | HEART RATE: 74 BPM | SYSTOLIC BLOOD PRESSURE: 120 MMHG | HEIGHT: 62 IN

## 2021-09-16 DIAGNOSIS — N92.6 IRREGULAR PERIODS/MENSTRUAL CYCLES: Primary | ICD-10-CM

## 2021-09-16 PROCEDURE — 99213 OFFICE O/P EST LOW 20 MIN: CPT | Performed by: NURSE PRACTITIONER

## 2021-09-16 RX ORDER — LEVONORGESTREL/ETHIN.ESTRADIOL 0.1-0.02MG
1 TABLET ORAL DAILY
Qty: 84 TABLET | Refills: 1 | Status: SHIPPED | OUTPATIENT
Start: 2021-09-16 | End: 2022-02-23

## 2021-09-16 ASSESSMENT — MIFFLIN-ST. JEOR: SCORE: 1317.67

## 2021-10-03 ENCOUNTER — HEALTH MAINTENANCE LETTER (OUTPATIENT)
Age: 21
End: 2021-10-03

## 2021-12-13 ENCOUNTER — TRANSFERRED RECORDS (OUTPATIENT)
Dept: HEALTH INFORMATION MANAGEMENT | Facility: CLINIC | Age: 21
End: 2021-12-13
Payer: COMMERCIAL

## 2022-01-19 ENCOUNTER — IMMUNIZATION (OUTPATIENT)
Dept: NURSING | Facility: CLINIC | Age: 22
End: 2022-01-19
Payer: COMMERCIAL

## 2022-01-19 PROCEDURE — 0054A COVID-19,PF,PFIZER (12+ YRS): CPT

## 2022-01-19 PROCEDURE — 91305 COVID-19,PF,PFIZER (12+ YRS): CPT

## 2022-02-23 DIAGNOSIS — N92.6 IRREGULAR PERIODS/MENSTRUAL CYCLES: ICD-10-CM

## 2022-02-23 RX ORDER — LEVONORGESTREL AND ETHINYL ESTRADIOL 0.1-0.02MG
KIT ORAL
Qty: 84 TABLET | Refills: 1 | Status: SHIPPED | OUTPATIENT
Start: 2022-02-23 | End: 2022-08-17

## 2022-02-23 NOTE — TELEPHONE ENCOUNTER
"Requested Prescriptions   Pending Prescriptions Disp Refills     LARISSIA 0.1-20 MG-MCG tablet [Pharmacy Med Name: LARISSIA-28 TABLET] 84 tablet 1     Sig: TAKE 1 TABLET BY MOUTH EVERY DAY       Contraceptives Protocol Passed - 2/23/2022  3:19 AM        Passed - Patient is not a current smoker if age is 35 or older        Passed - Recent (12 mo) or future (30 days) visit within the authorizing provider's specialty     Patient has had an office visit with the authorizing provider or a provider within the authorizing providers department within the previous 12 mos or has a future within next 30 days. See \"Patient Info\" tab in inbasket, or \"Choose Columns\" in Meds & Orders section of the refill encounter.              Passed - Medication is active on med list        Passed - No active pregnancy on record        Passed - No positive pregnancy test in past 12 months               Prescription approved per [facility] Refill Protocol.    Anabel Martínez RN    "

## 2022-05-10 ENCOUNTER — OFFICE VISIT (OUTPATIENT)
Dept: FAMILY MEDICINE | Facility: CLINIC | Age: 22
End: 2022-05-10
Payer: COMMERCIAL

## 2022-05-10 ENCOUNTER — NURSE TRIAGE (OUTPATIENT)
Dept: NURSING | Facility: CLINIC | Age: 22
End: 2022-05-10

## 2022-05-10 VITALS
HEIGHT: 62 IN | OXYGEN SATURATION: 100 % | WEIGHT: 139.7 LBS | RESPIRATION RATE: 16 BRPM | SYSTOLIC BLOOD PRESSURE: 138 MMHG | BODY MASS INDEX: 25.71 KG/M2 | DIASTOLIC BLOOD PRESSURE: 88 MMHG | TEMPERATURE: 98.6 F | HEART RATE: 71 BPM
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
## 2022-05-10 DIAGNOSIS — S40.862A TICK BITE OF LEFT UPPER ARM, INITIAL ENCOUNTER: Primary | ICD-10-CM

## 2022-05-10 PROCEDURE — 99203 OFFICE O/P NEW LOW 30 MIN: CPT | Performed by: PHYSICIAN ASSISTANT

## 2022-05-10 RX ORDER — DOXYCYCLINE 100 MG/1
200 CAPSULE ORAL ONCE
Qty: 2 CAPSULE | Refills: 0 | Status: SHIPPED | OUTPATIENT
Start: 2022-05-10 | End: 2022-05-10

## 2022-05-10 NOTE — PROGRESS NOTES
"  Assessment & Plan     Tick bite of left upper arm, initial encounter  Discussed bullseye rash, pt will look out for.  She will also check for fever and myalgias.  She has a  history of lupus which does complicate things.  - doxycycline monohydrate (MONODOX) 100 MG capsule; Take 2 capsules (200 mg) by mouth once for 1 dose             BMI:   Estimated body mass index is 25.97 kg/m  as calculated from the following:    Height as of this encounter: 1.562 m (5' 1.5\").    Weight as of this encounter: 63.4 kg (139 lb 11.2 oz).           Return in about 1 week (around 5/17/2022) for Recheck if not improving.    Yahaira Hanson PA-C  [facility]    Mike Morgan is a 22 year old who presents for the following health issues    History of Present Illness       Reason for visit:  Tick bite  Symptom onset:  Today  Symptoms include:  Red ring around bite, slight tenderness around area  Symptom intensity:  Mild  Symptom progression:  Staying the same  Had these symptoms before:  No    She eats 0-1 servings of fruits and vegetables daily.She consumes 1 sweetened beverage(s) daily.She exercises with enough effort to increase her heart rate 20 to 29 minutes per day.  She exercises with enough effort to increase her heart rate 4 days per week.   She is taking medications regularly.     Additional complaints:   HPI additional notes: Cathy presents today with   Chief Complaint   Patient presents with     Tick Bite   Checked self on Saturday for ticks, went a walk yesterday and thinks that's where it came from.  Was slightly larger than a pencil tip.           Review of Systems   C: Negative for fever, chills, recent change in weight  Skin: POSITIVE for tick bite, small tick slightly difficult to remove. Negative for discharge  ENT: Negative for ear, mouth and throat problems  MS: Negative for significant arthralgias or myalgias  P: Negative for changes in mood or affect      Objective    BP " "138/88   Pulse 71   Temp 98.6  F (37  C) (Oral)   Resp 16   Ht 1.562 m (5' 1.5\")   Wt 63.4 kg (139 lb 11.2 oz)   LMP 04/27/2022 (Approximate)   SpO2 100%   BMI 25.97 kg/m    Body mass index is 25.97 kg/m .  Physical Exam     Physical Exam   GENERAL: healthy, alert, in no acute distress  SKIN: mild scattered erythema, no significant bullseye patch or erythema suggestive of cellulitis.    PSYCH: Alert and oriented times 3;  Able to articulate logical thoughts. Affect is normal.    Diagnostic test results: none    "

## 2022-05-10 NOTE — TELEPHONE ENCOUNTER
"Pulled a tick off of herself this morning- \"I think it was a dog tick. It was brown and not the smallest tick\".     Under left arm - arm pit area  Patient is seeing a ring - about the size of a everton.    No fever, no red streaking, no tenderness.    Per protocol, patient should be seen within 24 hours. Care advice given. Patient verbalized understanding and agrees with plan.  Transferred patient to scheduling.    Lois Rivas RN  05/10/22 7:47 AM  [facility] Nurse Advisor      Reason for Disposition    Red ring or bull's-eye rash occurs at tick bite    Additional Information    Negative: Sounds like a life-threatening emergency to the triager    Negative: Not a tick bite    Negative: [1] 2 to 14 days following tick bite AND [2] severe headache with fever occurs    Negative: [1] 2 to 14 days following tick bite AND [2] widespread rash with fever occurs    Negative: Patient sounds very sick or weak to the triager    Negative: [1] Fever AND [2] red area    Negative: [1] Fever AND [2] area is very tender to touch    Negative: [1] Red streak or red line AND [2] length > 2 inches (5 cm)    Negative: Can't remove live tick (after trying Care Advice)    Negative: Can't remove tick's head that was broken off in the skin (after trying Care Advice)    Negative: [1] 2 to 14 days following tick bite AND [2] fever AND [3] no rash or headache    Negative: [1] 2 to 14 days following tick bite AND [2] widespread rash or headache AND [3] no fever    Negative: [1] Red or very tender (to touch) area AND [2] started over 24 hours after the bite    Protocols used: TICK BITE-A-    COVID 19 Nurse Triage Plan/Patient Instructions    Please be aware that novel coronavirus (COVID-19) may be circulating in the community. If you develop symptoms such as fever, cough, or SOB or if you have concerns about the presence of another infection including coronavirus (COVID-19), please contact your health care provider or visit " [email or web address].     Disposition/Instructions    In-Person Visit with provider recommended. Reference Visit Selection Guide.    Thank you for taking steps to prevent the spread of this virus.  o Limit your contact with others.  o Wear a simple mask to cover your cough.  o Wash your hands well and often.    Resources    [facility]: About COVID-19: [email or web address]    CDC: What to Do If You're Sick: www.cdc.gov/coronavirus/2019-ncov/about/steps-when-sick.html    CDC: Ending Home Isolation: www.cdc.gov/coronavirus/2019-ncov/hcp/disposition-in-home-patients.html     CDC: Caring for Someone: www.cdc.gov/coronavirus/2019-ncov/if-you-are-sick/care-for-someone.html     [facility]: Interim Guidance for Hospital Discharge to Home: [email or web address]/[email or web address]    [facility] clinical trials (COVID-19 research studies): [email or web address]     Below are the COVID-19 hotlines at the [place] [facility] ([facility]). Interpreters are available.   o For health questions: Call 739-210-6054 or 1-744.841.8855 (7 a.m. to 7 p.m.)  o For questions about schools and childcare: Call 684-634-6648 or 1-549.551.5172 (7 a.m. to 7 p.m.)

## 2022-06-01 ENCOUNTER — PATIENT OUTREACH (OUTPATIENT)
Dept: OBGYN | Facility: CLINIC | Age: 22
End: 2022-06-01
Payer: COMMERCIAL

## 2022-06-01 DIAGNOSIS — Z12.4 SCREENING FOR CERVICAL CANCER: ICD-10-CM

## 2022-06-20 ENCOUNTER — OFFICE VISIT (OUTPATIENT)
Dept: RHEUMATOLOGY | Facility: CLINIC | Age: 22
End: 2022-06-20
Attending: INTERNAL MEDICINE
Payer: COMMERCIAL

## 2022-06-20 ENCOUNTER — LAB (OUTPATIENT)
Dept: LAB | Facility: CLINIC | Age: 22
End: 2022-06-20
Payer: COMMERCIAL

## 2022-06-20 VITALS
SYSTOLIC BLOOD PRESSURE: 165 MMHG | HEART RATE: 83 BPM | DIASTOLIC BLOOD PRESSURE: 122 MMHG | BODY MASS INDEX: 25.78 KG/M2 | OXYGEN SATURATION: 100 % | WEIGHT: 138.7 LBS

## 2022-06-20 DIAGNOSIS — E55.9 VITAMIN D DEFICIENCY: ICD-10-CM

## 2022-06-20 DIAGNOSIS — M32.9 SYSTEMIC LUPUS ERYTHEMATOSUS, UNSPECIFIED SLE TYPE, UNSPECIFIED ORGAN INVOLVEMENT STATUS (H): ICD-10-CM

## 2022-06-20 DIAGNOSIS — E55.9 VITAMIN D DEFICIENCY: Primary | ICD-10-CM

## 2022-06-20 LAB
ALBUMIN SERPL-MCNC: 3.8 G/DL (ref 3.4–5)
ALBUMIN UR-MCNC: 30 MG/DL
ALT SERPL W P-5'-P-CCNC: 25 U/L (ref 0–50)
APPEARANCE UR: CLEAR
AST SERPL W P-5'-P-CCNC: 12 U/L (ref 0–45)
BASOPHILS # BLD AUTO: 0 10E3/UL (ref 0–0.2)
BASOPHILS NFR BLD AUTO: 0 %
BILIRUB UR QL STRIP: NEGATIVE
COLOR UR AUTO: YELLOW
CREAT SERPL-MCNC: 0.81 MG/DL (ref 0.52–1.04)
CREAT UR-MCNC: 124 MG/DL
CRP SERPL-MCNC: <2.9 MG/L (ref 0–8)
DEPRECATED CALCIDIOL+CALCIFEROL SERPL-MC: 30 UG/L (ref 20–75)
EOSINOPHIL # BLD AUTO: 0.4 10E3/UL (ref 0–0.7)
EOSINOPHIL NFR BLD AUTO: 5 %
ERYTHROCYTE [DISTWIDTH] IN BLOOD BY AUTOMATED COUNT: 13.2 % (ref 10–15)
ERYTHROCYTE [SEDIMENTATION RATE] IN BLOOD BY WESTERGREN METHOD: 7 MM/HR (ref 0–20)
GFR SERPL CREATININE-BSD FRML MDRD: >90 ML/MIN/1.73M2
GLUCOSE UR STRIP-MCNC: NEGATIVE MG/DL
HCT VFR BLD AUTO: 49.7 % (ref 35–47)
HGB BLD-MCNC: 16.3 G/DL (ref 11.7–15.7)
HGB UR QL STRIP: NEGATIVE
IMM GRANULOCYTES # BLD: 0 10E3/UL
IMM GRANULOCYTES NFR BLD: 0 %
KETONES UR STRIP-MCNC: NEGATIVE MG/DL
LEUKOCYTE ESTERASE UR QL STRIP: NEGATIVE
LYMPHOCYTES # BLD AUTO: 2.3 10E3/UL (ref 0.8–5.3)
LYMPHOCYTES NFR BLD AUTO: 25 %
MCH RBC QN AUTO: 29 PG (ref 26.5–33)
MCHC RBC AUTO-ENTMCNC: 32.8 G/DL (ref 31.5–36.5)
MCV RBC AUTO: 88 FL (ref 78–100)
MONOCYTES # BLD AUTO: 0.3 10E3/UL (ref 0–1.3)
MONOCYTES NFR BLD AUTO: 3 %
MUCOUS THREADS #/AREA URNS LPF: PRESENT /LPF
NEUTROPHILS # BLD AUTO: 5.9 10E3/UL (ref 1.6–8.3)
NEUTROPHILS NFR BLD AUTO: 67 %
NITRATE UR QL: NEGATIVE
NRBC # BLD AUTO: 0 10E3/UL
NRBC BLD AUTO-RTO: 0 /100
PH UR STRIP: 7 [PH] (ref 5–7)
PLATELET # BLD AUTO: 258 10E3/UL (ref 150–450)
PROT UR-MCNC: 0.35 G/L
PROT/CREAT 24H UR: 0.28 G/G CR (ref 0–0.2)
RBC # BLD AUTO: 5.63 10E6/UL (ref 3.8–5.2)
RBC URINE: 1 /HPF
SP GR UR STRIP: 1.01 (ref 1–1.03)
SQUAMOUS EPITHELIAL: <1 /HPF
TRANSITIONAL EPI: <1 /HPF
UROBILINOGEN UR STRIP-MCNC: NORMAL MG/DL
WBC # BLD AUTO: 8.9 10E3/UL (ref 4–11)
WBC URINE: <1 /HPF

## 2022-06-20 PROCEDURE — 86225 DNA ANTIBODY NATIVE: CPT | Mod: 90 | Performed by: PATHOLOGY

## 2022-06-20 PROCEDURE — 84460 ALANINE AMINO (ALT) (SGPT): CPT | Performed by: PATHOLOGY

## 2022-06-20 PROCEDURE — 85025 COMPLETE CBC W/AUTO DIFF WBC: CPT | Performed by: PATHOLOGY

## 2022-06-20 PROCEDURE — 82565 ASSAY OF CREATININE: CPT | Performed by: PATHOLOGY

## 2022-06-20 PROCEDURE — 82040 ASSAY OF SERUM ALBUMIN: CPT | Performed by: PATHOLOGY

## 2022-06-20 PROCEDURE — G0463 HOSPITAL OUTPT CLINIC VISIT: HCPCS

## 2022-06-20 PROCEDURE — 82306 VITAMIN D 25 HYDROXY: CPT | Mod: 90 | Performed by: PATHOLOGY

## 2022-06-20 PROCEDURE — 84156 ASSAY OF PROTEIN URINE: CPT | Performed by: PATHOLOGY

## 2022-06-20 PROCEDURE — 99214 OFFICE O/P EST MOD 30 MIN: CPT | Performed by: INTERNAL MEDICINE

## 2022-06-20 PROCEDURE — 81001 URINALYSIS AUTO W/SCOPE: CPT | Performed by: PATHOLOGY

## 2022-06-20 PROCEDURE — 86160 COMPLEMENT ANTIGEN: CPT | Mod: 90 | Performed by: PATHOLOGY

## 2022-06-20 PROCEDURE — 85652 RBC SED RATE AUTOMATED: CPT | Performed by: PATHOLOGY

## 2022-06-20 PROCEDURE — 84450 TRANSFERASE (AST) (SGOT): CPT | Performed by: PATHOLOGY

## 2022-06-20 PROCEDURE — 86140 C-REACTIVE PROTEIN: CPT | Performed by: PATHOLOGY

## 2022-06-20 PROCEDURE — 99000 SPECIMEN HANDLING OFFICE-LAB: CPT | Performed by: PATHOLOGY

## 2022-06-20 PROCEDURE — 36415 COLL VENOUS BLD VENIPUNCTURE: CPT | Performed by: PATHOLOGY

## 2022-06-20 ASSESSMENT — PAIN SCALES - GENERAL: PAINLEVEL: NO PAIN (0)

## 2022-06-20 NOTE — PATIENT INSTRUCTIONS
Vit D 2000 units a day    Avoid Sun, catie catie sprouts, garlic    Labs today     Return in a year (in person)

## 2022-06-20 NOTE — LETTER
6/20/2022       RE: Cathy Freedman  [address] [facility] [id number]     Dear Colleague,    Thank you for referring your patient, Cathy Freedman, to the [facility] [place] [facility]. Please see a copy of my visit note below.    Chief Complaint   Patient presents with     RECHECK     Follow up     Date of initial visit: 6/25/2021    DOS: 6/20/2022    Reason for visit: SLE    Patient Active Problem List   Diagnosis     Systemic lupus erythematosus (H)     Immunosuppression (H)     Lupus (systemic lupus erythematosus) (H)     Lupus nephritis, ISN/RPS class IV (H)     Hypertension     Screening for cervical cancer          Subjective:       HPI from last visit with ped rheumatology 1/22/2020:     Cathy is a 19 year old female who was seen in Pediatric Rheumatology clinic today for a follow-up visit of SLE. Cathy is accompanied today by both parents.  Cathy was last seen in clinic on 8/15/2018 by Dr. Lio Jo, at which time she was not taking all of her medications as prescribed but overall did not have any major concerns.  She was lost to follow-up there after, despite multiple attempts we could not get her to return calls or follow-up.  Today she tells me that shortly after that visit she continued to take some of her medications intermittently and is likely out of her medications by the winter 2019.  Approximately 1 to 1-1/2 years ago.  Since then she has felt increasingly well.  She denies any problems with lupus related symptoms such as fever rash joint pain leg swelling.  Her 14 system review as noted below is negative    She was worried increasingly that she may be having some active lupus and just not be aware and decided she better come back for a follow-up visit.  By her she was on the schedule I asked her to repeat her labs a few days before, those are listed below and are remarkably  normal!        6/25/2021: She was last seen by ped rheumatology in 1/2020, she was doing well off meds back then. She was not seen since then given COVID pandemic. She thinks she is doing really good. Few weeks ago, had some joint pain but she was working a lot.    No skin redness, being sluggish or fatigue anymore.    No hair loss.    Has some chronic hair loss over her temporal area.    Gets red from sun exposure, sun burn hurts more but no rashes. Uses sunscreen.    No oral ulcers.    No Joint pain.    No CP, SOB, cough or fevers.    No dry eyes, dry mouth.    No Raynaud's.    No depression.    She wants to know if her internal organs are good or not, likes to do labs.    Has been prescribed OC to control hormonal acne and menstrual cycle, not sexually active. Occasionally drinks wine with her mom. 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     Today 6/20/2022: Cathy presents for follow up. She is doing very well.    Sometime gets sharp pain over low back by turning.    Otherwise no other complaints.    Uses Resurfx effex lumenis for stretch marks.        ROS: A comprehensive ROS was done. Positives are per HPI.      Past Medical History:   Diagnosis Date     Anemia of chronic disease      Anxious appearance 1/3/2018     Long term systemic steroid user 10/17/2017     Lupus nephritis, ISN/RPS class IV (H)      Non-adherence to medical treatment      Psychosis (H) 1/2/2018     Renal hypertension      Skin breakdown 11/13/2017     SLE (systemic lupus erythematosus related syndrome) (H)        Past Surgical History:   Procedure Laterality Date     PERCUTANEOUS BIOPSY KIDNEY Right 10/6/2017    Procedure: PERCUTANEOUS BIOPSY KIDNEY;  Kidney Biopsy Percutaneous Right ;  Surgeon: Preston Russo MD;  Location: [facility] OR       Family History   Problem Relation Age of Onset     No Known Problems Mother      No Known Problems Father      No Known Problems Sister      No Known Problems Brother      No Known Problems  Maternal Grandmother      No Known Problems Maternal Grandfather      No Known Problems Paternal Grandmother      No Known Problems Paternal Grandfather      Diabetes Maternal Aunt      Hypertension Paternal Uncle      Thyroid Disease Other         Cousin: hyperthyroid       Social History     Tobacco Use     Smoking status: Never Smoker     Smokeless tobacco: Never Used   Substance Use Topics     Alcohol use: Yes     Alcohol/week: 0.0 standard drinks     Comment: rare         Allergies:     Allergies   Allergen Reactions     Penicillin G      Avoiding with diagnosis of lupus          Medications:     None       Medical --  Family -- Social History:     Past Medical History:   Diagnosis Date     Anemia of chronic disease      Anxious appearance 1/3/2018     Long term systemic steroid user 10/17/2017     Lupus nephritis, ISN/RPS class IV (H)      Non-adherence to medical treatment      Psychosis (H) 1/2/2018     Renal hypertension      Skin breakdown 11/13/2017     SLE (systemic lupus erythematosus related syndrome) (H)      Past Surgical History:   Procedure Laterality Date     PERCUTANEOUS BIOPSY KIDNEY Right 10/6/2017    Procedure: PERCUTANEOUS BIOPSY KIDNEY;  Kidney Biopsy Percutaneous Right ;  Surgeon: Preston Russo MD;  Location: [facility] OR     Family History   Problem Relation Age of Onset     No Known Problems Mother      No Known Problems Father      No Known Problems Sister      No Known Problems Brother      No Known Problems Maternal Grandmother      No Known Problems Maternal Grandfather      No Known Problems Paternal Grandmother      No Known Problems Paternal Grandfather      Diabetes Maternal Aunt      Hypertension Paternal Uncle      Thyroid Disease Other         Cousin: hyperthyroid     Social History     Social History Narrative    Family History     Father Eliseo: Occup[employer]      Mother Lupe: Occupation      Brother Hakan 07/11/2007: History is negative      Sister Elizabeth 06/11/2000: History is negative     Pat Sister Latasha 09/24/1985: History is negative     Pat Sister Shira 05/15/1988: History is negative     Mat Grandfather: Anemia     Family History: Autoimmune disorder Cousin        Social History     Home/Environment    Lives with: Father, Mother, Siblings. Living situation: Home.        /School/Work    Grade level: She graduated from high school in 2017.  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          Examination:   BP (!) 165/122   Pulse 83   Wt 62.9 kg (138 lb 11.2 oz)   LMP 04/27/2022 (Approximate)   SpO2 100%   BMI 25.78 kg/m      Constitutional: NAD, pleasant  Eyes: nl conj, sclera, EOMI  HEENT: no oral ulcers  Neck; no cervical LAP  Chest: CTAB  CV: no M/R/G, RRR  Abdomen: soft, NT  Neurologic: non focal  Psychiatric: nl affect  Skin: no rash, striae  Musculoskeletal: no active synovitis or joint tenderness         Last Imaging Results:     Results for orders placed or performed in visit on 01/29/18   XR Chest 2 Views    Narrative    XR CHEST 2 VW  1/29/2018 9:43 AM      HISTORY: ; Systemic lupus erythematosus with lung involvement,  unspecified SLE type (H)    COMPARISON: 10/4/2017    FINDINGS: Frontal and lateral views of the chest. The cardiac  silhouette size and pulmonary vasculature are within normal limits.  There is no significant pleural effusion or pneumothorax. There are no  focal pulmonary opacities. The visualized upper abdomen and bones  appear normal.      Impression    IMPRESSION: Clear lungs.    SALEEM BECERRA MD          Last Lab Results:     No visits with results within 2 Day(s) from this visit.   Latest known visit with results is:   Hospital Outpatient Visit on 01/20/2020   Component Date Value     Sed Rate 01/20/2020 6      Complement C4 01/20/2020 19      Complement C3 01/20/2020 107      Sodium 01/20/2020 138      Potassium 01/20/2020 3.7       Chloride 01/20/2020 108      Carbon Dioxide 01/20/2020 25      Anion Gap 01/20/2020 5      Glucose 01/20/2020 79      Urea Nitrogen 01/20/2020 12      Creatinine 01/20/2020 0.74      GFR Estimate 01/20/2020 >90      GFR Estimate If Black 01/20/2020 >90      Calcium 01/20/2020 9.2      Bilirubin Total 01/20/2020 0.5      Albumin 01/20/2020 3.6      Protein Total 01/20/2020 7.4      Alkaline Phosphatase 01/20/2020 68      ALT 01/20/2020 19      AST 01/20/2020 9      DNA-ds 01/20/2020 33*     WBC 01/20/2020 8.0      RBC Count 01/20/2020 5.31*     Hemoglobin 01/20/2020 14.9      Hematocrit 01/20/2020 46.6      MCV 01/20/2020 88      MCH 01/20/2020 28.1      MCHC 01/20/2020 32.0      RDW 01/20/2020 14.1      Platelet Count 01/20/2020 239      Diff Method 01/20/2020 Automated Method      % Neutrophils 01/20/2020 45.6      % Lymphocytes 01/20/2020 41.2      % Monocytes 01/20/2020 5.5      % Eosinophils 01/20/2020 7.0      % Basophils 01/20/2020 0.6      % Immature Granulocytes 01/20/2020 0.1      Nucleated RBCs 01/20/2020 0      Absolute Neutrophil 01/20/2020 3.7      Absolute Lymphocytes 01/20/2020 3.3      Absolute Monocytes 01/20/2020 0.4      Absolute Eosinophils 01/20/2020 0.6      Absolute Basophils 01/20/2020 0.1      Abs Immature Granulocytes 01/20/2020 0.0      Absolute Nucleated RBC 01/20/2020 0.0      Protein Random Urine 01/20/2020 0.41      Protein Total Urine g/gr* 01/20/2020 0.21*     Color Urine 01/20/2020 Yellow      Appearance Urine 01/20/2020 Clear      Glucose Urine 01/20/2020 Negative      Bilirubin Urine 01/20/2020 Negative      Ketones Urine 01/20/2020 Negative      Specific Gravity Urine 01/20/2020 1.026      Blood Urine 01/20/2020 Negative      pH Urine 01/20/2020 6.0      Protein Albumin Urine 01/20/2020 50*     Urobilinogen mg/dL 01/20/2020 Normal      Nitrite Urine 01/20/2020 Negative      Leukocyte Esterase Urine 01/20/2020 Negative      Source 01/20/2020 Midstream Urine      WBC Urine  01/20/2020 1      RBC Urine 01/20/2020 1      Squamous Epithelial /HPF* 01/20/2020 4*     Mucous Urine 01/20/2020 Present*     Creatinine Urine 01/20/2020 197      Component      Latest Ref Rng & Units 7/9/2021   WBC      4.0 - 11.0 10e9/L 9.2   RBC Count      3.8 - 5.2 10e12/L 4.80   Hemoglobin      11.7 - 15.7 g/dL 13.8   Hematocrit      35.0 - 47.0 % 41.8   MCV      78 - 100 fl 87   MCH      26.5 - 33.0 pg 28.8   MCHC      31.5 - 36.5 g/dL 33.0   RDW      10.0 - 15.0 % 14.2   Platelet Count      150 - 450 10e9/L 196   % Neutrophils      % 57.2   % Lymphocytes      % 31.5   % Monocytes      % 6.4   % Eosinophils      % 4.5   % Basophils      % 0.4   Absolute Neutrophil      1.6 - 8.3 10e9/L 5.3   Absolute Lymphocytes      0.8 - 5.3 10e9/L 2.9   Absolute Monocytes      0.0 - 1.3 10e9/L 0.6   Absolute Eosinophils      0.0 - 0.7 10e9/L 0.4   Absolute Basophils      0.0 - 0.2 10e9/L 0.0   Diff Method       Automated Method   Color Urine       Yellow   Appearance Urine       Clear   Glucose Urine      NEG:Negative mg/dL Negative   Bilirubin Urine      NEG:Negative Negative   Ketones Urine      NEG:Negative mg/dL Trace (A)   Specific Gravity Urine      1.003 - 1.035 >1.030   pH Urine      5.0 - 7.0 pH 5.5   Protein Albumin Urine      NEG:Negative mg/dL 30 (A)   Urobilinogen Urine      0.2 - 1.0 EU/dL 0.2   Nitrite Urine      NEG:Negative Negative   Blood Urine      NEG:Negative Negative   Leukocyte Esterase Urine      NEG:Negative Negative   Source       Midstream Urine   WBC Urine      OTO5:0 - 5 /HPF 0 - 5   RBC Urine      OTO2:O - 2 /HPF O - 2   Creatinine      0.52 - 1.04 mg/dL 0.94   GFR Estimate      >60 mL/min/1.73:m2 86   GFR Estimate If Black      >60 mL/min/1.73:m2 >90   Beta 2 Glycoprotein 1 Antibody IgG      <7 U/mL 0.9   Beta 2 Glycoprotein 1 Antibody IgM      <7 U/mL <2.9   Cardiolipin Antibody IgG      0.0 - 19.9 GPL-U/mL 2.2   Cardiolipin Antibody IgM      0.0 - 19.9 MPL-U/mL 1.1   Protein Random Urine       g/L 0.25   Protein Total Urine g/gr Creatinine      0 - 0.2 g/g Cr 0.13   Beta 2 Glycoprotein 1 Antibody IgA      <7 U/mL 3.1   Cardiolipin Antibody IgA      0.0 - 19.9 APL U/mL 4.9   Lupus Result      NEG:Negative Negative   Vitamin D Deficiency screening      20 - 75 ug/L 29   Creatinine Urine Random      mg/dL 195   Sed Rate      0 - 20 mm/h 9   DNA-ds      <10 IU/mL 44 (H)   CRP Inflammation      0.0 - 8.0 mg/L <2.9   Complement C3      81 - 157 mg/dL 96   Complement C4      13 - 39 mg/dL 15   AST      0 - 45 U/L 11   Albumin      3.4 - 5.0 g/dL 3.4   ALT      0 - 50 U/L 19   Creatinine Urine      mg/dL 195          Assessment :       Cathy is a 22-year-old F with a history of quite severe systemic lupus erythematosus  But now her SLE is quiet off meds. 7/2021 labs showed + anti-DNA. No complaints. Doing well off meds. Will check labs. Discussed taking vit d to keep vit D at goal, close to 40. Ok to treat stretch marks. Discussed triggers of lupus. Low threshold to resume HCQ if labs show active SLE.         Recommendations and follow-up:     Vit D 2000 units a day    Avoid Sun, catie catie sprouts, garlic    Labs today     Return in a year (in person)        Alberto Galvez MD    Orders Placed This Encounter   Procedures     ALT     Albumin level     AST     Creatinine     Complement C4     Complement C3     CRP inflammation     DNA double stranded antibodies     Erythrocyte sedimentation rate auto     UA with Microscopic reflex to Culture     Protein  random urine     Creatinine random urine     Vitamin D Deficiency     CBC with Platelets & Differential       Alberto Galvez MD

## 2022-06-20 NOTE — NURSING NOTE
Chief Complaint   Patient presents with     RECHECK     Follow up     Blood pressure (!) 165/122, pulse 83, weight 62.9 kg (138 lb 11.2 oz), last menstrual period 04/27/2022, SpO2 100 %, not currently breastfeeding.    Katarzyna Gonzalez

## 2022-06-20 NOTE — PROGRESS NOTES
Chief Complaint   Patient presents with     RECHECK     Follow up     Date of initial visit: 6/25/2021    DOS: 6/20/2022    Reason for visit: SLE    Patient Active Problem List   Diagnosis     Systemic lupus erythematosus (H)     Immunosuppression (H)     Lupus (systemic lupus erythematosus) (H)     Lupus nephritis, ISN/RPS class IV (H)     Hypertension     Screening for cervical cancer          Subjective:       HPI from last visit with ped rheumatology 1/22/2020:     Cathy is a 19 year old female who was seen in Pediatric Rheumatology clinic today for a follow-up visit of SLE. Cathy is accompanied today by both parents.  Cathy was last seen in clinic on 8/15/2018 by Dr. Lio Jo, at which time she was not taking all of her medications as prescribed but overall did not have any major concerns.  She was lost to follow-up there after, despite multiple attempts we could not get her to return calls or follow-up.  Today she tells me that shortly after that visit she continued to take some of her medications intermittently and is likely out of her medications by the winter 2019.  Approximately 1 to 1-1/2 years ago.  Since then she has felt increasingly well.  She denies any problems with lupus related symptoms such as fever rash joint pain leg swelling.  Her 14 system review as noted below is negative    She was worried increasingly that she may be having some active lupus and just not be aware and decided she better come back for a follow-up visit.  By her she was on the schedule I asked her to repeat her labs a few days before, those are listed below and are remarkably normal!        6/25/2021: She was last seen by ped rheumatology in 1/2020, she was doing well off meds back then. She was not seen since then given COVID pandemic. She thinks she is doing really good. Few weeks ago, had some joint pain but she was working a lot.    No skin redness, being sluggish or fatigue anymore.    No hair loss.    Has some  chronic hair loss over her temporal area.    Gets red from sun exposure, sun burn hurts more but no rashes. Uses sunscreen.    No oral ulcers.    No Joint pain.    No CP, SOB, cough or fevers.    No dry eyes, dry mouth.    No Raynaud's.    No depression.    She wants to know if her internal organs are good or not, likes to do labs.    Has been prescribed OC to control hormonal acne and menstrual cycle, not sexually active. Occasionally drinks wine with her mom. 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     Today 6/20/2022: Cathy presents for follow up. She is doing very well.    Sometime gets sharp pain over low back by turning.    Otherwise no other complaints.    Uses Resurfx effex lumenis for stretch marks.        ROS: A comprehensive ROS was done. Positives are per HPI.      Past Medical History:   Diagnosis Date     Anemia of chronic disease      Anxious appearance 1/3/2018     Long term systemic steroid user 10/17/2017     Lupus nephritis, ISN/RPS class IV (H)      Non-adherence to medical treatment      Psychosis (H) 1/2/2018     Renal hypertension      Skin breakdown 11/13/2017     SLE (systemic lupus erythematosus related syndrome) (H)        Past Surgical History:   Procedure Laterality Date     PERCUTANEOUS BIOPSY KIDNEY Right 10/6/2017    Procedure: PERCUTANEOUS BIOPSY KIDNEY;  Kidney Biopsy Percutaneous Right ;  Surgeon: Preston Russo MD;  Location: [facility]       Family History   Problem Relation Age of Onset     No Known Problems Mother      No Known Problems Father      No Known Problems Sister      No Known Problems Brother      No Known Problems Maternal Grandmother      No Known Problems Maternal Grandfather      No Known Problems Paternal Grandmother      No Known Problems Paternal Grandfather      Diabetes Maternal Aunt      Hypertension Paternal Uncle      Thyroid Disease Other         Cousin: hyperthyroid       Social History     Tobacco Use     Smoking status: Never Smoker      Smokeless tobacco: Never Used   Substance Use Topics     Alcohol use: Yes     Alcohol/week: 0.0 standard drinks     Comment: rare         Allergies:     Allergies   Allergen Reactions     Penicillin G      Avoiding with diagnosis of lupus          Medications:     None       Medical --  Family -- Social History:     Past Medical History:   Diagnosis Date     Anemia of chronic disease      Anxious appearance 1/3/2018     Long term systemic steroid user 10/17/2017     Lupus nephritis, ISN/RPS class IV (H)      Non-adherence to medical treatment      Psychosis (H) 1/2/2018     Renal hypertension      Skin breakdown 11/13/2017     SLE (systemic lupus erythematosus related syndrome) (H)      Past Surgical History:   Procedure Laterality Date     PERCUTANEOUS BIOPSY KIDNEY Right 10/6/2017    Procedure: PERCUTANEOUS BIOPSY KIDNEY;  Kidney Biopsy Percutaneous Right ;  Surgeon: Preston uRsso MD;  Location: [facility]     Family History   Problem Relation Age of Onset     No Known Problems Mother      No Known Problems Father      No Known Problems Sister      No Known Problems Brother      No Known Problems Maternal Grandmother      No Known Problems Maternal Grandfather      No Known Problems Paternal Grandmother      No Known Problems Paternal Grandfather      Diabetes Maternal Aunt      Hypertension Paternal Uncle      Thyroid Disease Other         Cousin: hyperthyroid     Social History     Social History Narrative    Family History     Father Eliseo: Occup[employer]      Mother Andrade: Occupation      Brother Hakan 07/11/2007: History is negative     Sister Elizabeth 06/11/2000: History is negative     Pat Sister Latasha 09/24/1985: History is negative     Pat Sister Shira 05/15/1988: History is negative     Mat Grandfather: Anemia     Family History: Autoimmune disorder Cousin        Social History     Home/Environment    Lives with: Father, Mother, Siblings. Living situation: Home.         /School/Work    Grade level: She graduated from high school in 2017.  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          Examination:   BP (!) 165/122   Pulse 83   Wt 62.9 kg (138 lb 11.2 oz)   LMP 04/27/2022 (Approximate)   SpO2 100%   BMI 25.78 kg/m      Constitutional: NAD, pleasant  Eyes: nl conj, sclera, EOMI  HEENT: no oral ulcers  Neck; no cervical LAP  Chest: CTAB  CV: no M/R/G, RRR  Abdomen: soft, NT  Neurologic: non focal  Psychiatric: nl affect  Skin: no rash, striae  Musculoskeletal: no active synovitis or joint tenderness         Last Imaging Results:     Results for orders placed or performed in visit on 01/29/18   XR Chest 2 Views    Narrative    XR CHEST 2 VW  1/29/2018 9:43 AM      HISTORY: ; Systemic lupus erythematosus with lung involvement,  unspecified SLE type (H)    COMPARISON: 10/4/2017    FINDINGS: Frontal and lateral views of the chest. The cardiac  silhouette size and pulmonary vasculature are within normal limits.  There is no significant pleural effusion or pneumothorax. There are no  focal pulmonary opacities. The visualized upper abdomen and bones  appear normal.      Impression    IMPRESSION: Clear lungs.    SALEEM BECERRA MD          Last Lab Results:     No visits with results within 2 Day(s) from this visit.   Latest known visit with results is:   Hospital Outpatient Visit on 01/20/2020   Component Date Value     Sed Rate 01/20/2020 6      Complement C4 01/20/2020 19      Complement C3 01/20/2020 107      Sodium 01/20/2020 138      Potassium 01/20/2020 3.7      Chloride 01/20/2020 108      Carbon Dioxide 01/20/2020 25      Anion Gap 01/20/2020 5      Glucose 01/20/2020 79      Urea Nitrogen 01/20/2020 12      Creatinine 01/20/2020 0.74      GFR Estimate 01/20/2020 >90      GFR Estimate If Black 01/20/2020 >90      Calcium 01/20/2020 9.2      Bilirubin Total 01/20/2020 0.5      Albumin  01/20/2020 3.6      Protein Total 01/20/2020 7.4      Alkaline Phosphatase 01/20/2020 68      ALT 01/20/2020 19      AST 01/20/2020 9      DNA-ds 01/20/2020 33*     WBC 01/20/2020 8.0      RBC Count 01/20/2020 5.31*     Hemoglobin 01/20/2020 14.9      Hematocrit 01/20/2020 46.6      MCV 01/20/2020 88      MCH 01/20/2020 28.1      MCHC 01/20/2020 32.0      RDW 01/20/2020 14.1      Platelet Count 01/20/2020 239      Diff Method 01/20/2020 Automated Method      % Neutrophils 01/20/2020 45.6      % Lymphocytes 01/20/2020 41.2      % Monocytes 01/20/2020 5.5      % Eosinophils 01/20/2020 7.0      % Basophils 01/20/2020 0.6      % Immature Granulocytes 01/20/2020 0.1      Nucleated RBCs 01/20/2020 0      Absolute Neutrophil 01/20/2020 3.7      Absolute Lymphocytes 01/20/2020 3.3      Absolute Monocytes 01/20/2020 0.4      Absolute Eosinophils 01/20/2020 0.6      Absolute Basophils 01/20/2020 0.1      Abs Immature Granulocytes 01/20/2020 0.0      Absolute Nucleated RBC 01/20/2020 0.0      Protein Random Urine 01/20/2020 0.41      Protein Total Urine g/gr* 01/20/2020 0.21*     Color Urine 01/20/2020 Yellow      Appearance Urine 01/20/2020 Clear      Glucose Urine 01/20/2020 Negative      Bilirubin Urine 01/20/2020 Negative      Ketones Urine 01/20/2020 Negative      Specific Gravity Urine 01/20/2020 1.026      Blood Urine 01/20/2020 Negative      pH Urine 01/20/2020 6.0      Protein Albumin Urine 01/20/2020 50*     Urobilinogen mg/dL 01/20/2020 Normal      Nitrite Urine 01/20/2020 Negative      Leukocyte Esterase Urine 01/20/2020 Negative      Source 01/20/2020 Midstream Urine      WBC Urine 01/20/2020 1      RBC Urine 01/20/2020 1      Squamous Epithelial /HPF* 01/20/2020 4*     Mucous Urine 01/20/2020 Present*     Creatinine Urine 01/20/2020 197      Component      Latest Ref Rng & Units 7/9/2021   WBC      4.0 - 11.0 10e9/L 9.2   RBC Count      3.8 - 5.2 10e12/L 4.80   Hemoglobin      11.7 - 15.7 g/dL 13.8   Hematocrit       35.0 - 47.0 % 41.8   MCV      78 - 100 fl 87   MCH      26.5 - 33.0 pg 28.8   MCHC      31.5 - 36.5 g/dL 33.0   RDW      10.0 - 15.0 % 14.2   Platelet Count      150 - 450 10e9/L 196   % Neutrophils      % 57.2   % Lymphocytes      % 31.5   % Monocytes      % 6.4   % Eosinophils      % 4.5   % Basophils      % 0.4   Absolute Neutrophil      1.6 - 8.3 10e9/L 5.3   Absolute Lymphocytes      0.8 - 5.3 10e9/L 2.9   Absolute Monocytes      0.0 - 1.3 10e9/L 0.6   Absolute Eosinophils      0.0 - 0.7 10e9/L 0.4   Absolute Basophils      0.0 - 0.2 10e9/L 0.0   Diff Method       Automated Method   Color Urine       Yellow   Appearance Urine       Clear   Glucose Urine      NEG:Negative mg/dL Negative   Bilirubin Urine      NEG:Negative Negative   Ketones Urine      NEG:Negative mg/dL Trace (A)   Specific Gravity Urine      1.003 - 1.035 >1.030   pH Urine      5.0 - 7.0 pH 5.5   Protein Albumin Urine      NEG:Negative mg/dL 30 (A)   Urobilinogen Urine      0.2 - 1.0 EU/dL 0.2   Nitrite Urine      NEG:Negative Negative   Blood Urine      NEG:Negative Negative   Leukocyte Esterase Urine      NEG:Negative Negative   Source       Midstream Urine   WBC Urine      OTO5:0 - 5 /HPF 0 - 5   RBC Urine      OTO2:O - 2 /HPF O - 2   Creatinine      0.52 - 1.04 mg/dL 0.94   GFR Estimate      >60 mL/min/1.73:m2 86   GFR Estimate If Black      >60 mL/min/1.73:m2 >90   Beta 2 Glycoprotein 1 Antibody IgG      <7 U/mL 0.9   Beta 2 Glycoprotein 1 Antibody IgM      <7 U/mL <2.9   Cardiolipin Antibody IgG      0.0 - 19.9 GPL-U/mL 2.2   Cardiolipin Antibody IgM      0.0 - 19.9 MPL-U/mL 1.1   Protein Random Urine      g/L 0.25   Protein Total Urine g/gr Creatinine      0 - 0.2 g/g Cr 0.13   Beta 2 Glycoprotein 1 Antibody IgA      <7 U/mL 3.1   Cardiolipin Antibody IgA      0.0 - 19.9 APL U/mL 4.9   Lupus Result      NEG:Negative Negative   Vitamin D Deficiency screening      20 - 75 ug/L 29   Creatinine Urine Random      mg/dL 195   Sed Rate       0 - 20 mm/h 9   DNA-ds      <10 IU/mL 44 (H)   CRP Inflammation      0.0 - 8.0 mg/L <2.9   Complement C3      81 - 157 mg/dL 96   Complement C4      13 - 39 mg/dL 15   AST      0 - 45 U/L 11   Albumin      3.4 - 5.0 g/dL 3.4   ALT      0 - 50 U/L 19   Creatinine Urine      mg/dL 195          Assessment :       Cathy is a 22-year-old F with a history of quite severe systemic lupus erythematosus  But now her SLE is quiet off meds. 7/2021 labs showed + anti-DNA. No complaints. Doing well off meds. Will check labs. Discussed taking vit d to keep vit D at goal, close to 40. Ok to treat stretch marks. Discussed triggers of lupus. Low threshold to resume HCQ if labs show active SLE.         Recommendations and follow-up:     Vit D 2000 units a day    Avoid Sun, catie catie sprouts, garlic    Labs today     Return in a year (in person)        Alberto Galvez MD    Orders Placed This Encounter   Procedures     ALT     Albumin level     AST     Creatinine     Complement C4     Complement C3     CRP inflammation     DNA double stranded antibodies     Erythrocyte sedimentation rate auto     UA with Microscopic reflex to Culture     Protein  random urine     Creatinine random urine     Vitamin D Deficiency     CBC with Platelets & Differential

## 2022-06-21 LAB
C3 SERPL-MCNC: 129 MG/DL (ref 81–157)
C4 SERPL-MCNC: 21 MG/DL (ref 13–39)
DSDNA AB SER-ACNC: 57 IU/ML

## 2022-06-28 DIAGNOSIS — M32.9 SYSTEMIC LUPUS ERYTHEMATOSUS, UNSPECIFIED SLE TYPE, UNSPECIFIED ORGAN INVOLVEMENT STATUS (H): Primary | ICD-10-CM

## 2022-06-28 DIAGNOSIS — E55.9 VITAMIN D DEFICIENCY: ICD-10-CM

## 2022-06-28 RX ORDER — ERGOCALCIFEROL 1.25 MG/1
50000 CAPSULE, LIQUID FILLED ORAL
Qty: 12 CAPSULE | Refills: 0 | Status: SHIPPED | OUTPATIENT
Start: 2022-06-28 | End: 2022-12-23

## 2022-07-13 ENCOUNTER — TELEPHONE (OUTPATIENT)
Dept: RHEUMATOLOGY | Facility: CLINIC | Age: 22
End: 2022-07-13

## 2022-07-13 NOTE — TELEPHONE ENCOUNTER
Pt called into clinic, she has noticed that she has been having increased redness in her eyes and headaches since starting HCQ.  Pt started medication June 28th after her visit with Dr. Galvez, and she has an appt for eye pressure testing tomorrow.      Pt states that she is struggling with the headaches as it makes her sensitive to noise, and today it is making it hard to concentrate.     Have advised pt that she is fine to hold medication until she follows up with ophthalmology tomorrow and until she hears back from Dr. Galvez.      Have let her know she is ok to take tylenol right now, but should not take anymore than 2 grams a day.  She is aware to not take ibuprofen or naproxen.    Radha Hendrix RN  Rheumatology Clinic

## 2022-07-15 NOTE — TELEPHONE ENCOUNTER
Alberto Galvez MD Beard, Radha, RN  Caller: Unspecified (2 days ago,  4:18 PM)  No redness of the eyes but I am wondering if HCQ triggered a migraine causing eye pressure.     OK to hold the HCQ, I asked her to resume to prevent her lupus from progression, she is asymptomatic    Left message requesting return call.    Radha Hendrix RN  Rheumatology Clinic

## 2022-07-28 ASSESSMENT — ANXIETY QUESTIONNAIRES
1. FEELING NERVOUS, ANXIOUS, OR ON EDGE: NOT AT ALL
IF YOU CHECKED OFF ANY PROBLEMS ON THIS QUESTIONNAIRE, HOW DIFFICULT HAVE THESE PROBLEMS MADE IT FOR YOU TO DO YOUR WORK, TAKE CARE OF THINGS AT HOME, OR GET ALONG WITH OTHER PEOPLE: NOT DIFFICULT AT ALL
7. FEELING AFRAID AS IF SOMETHING AWFUL MIGHT HAPPEN: NOT AT ALL
7. FEELING AFRAID AS IF SOMETHING AWFUL MIGHT HAPPEN: NOT AT ALL
5. BEING SO RESTLESS THAT IT IS HARD TO SIT STILL: NOT AT ALL
GAD7 TOTAL SCORE: 0
GAD7 TOTAL SCORE: 0
8. IF YOU CHECKED OFF ANY PROBLEMS, HOW DIFFICULT HAVE THESE MADE IT FOR YOU TO DO YOUR WORK, TAKE CARE OF THINGS AT HOME, OR GET ALONG WITH OTHER PEOPLE?: NOT DIFFICULT AT ALL
6. BECOMING EASILY ANNOYED OR IRRITABLE: NOT AT ALL
3. WORRYING TOO MUCH ABOUT DIFFERENT THINGS: NOT AT ALL
2. NOT BEING ABLE TO STOP OR CONTROL WORRYING: NOT AT ALL
4. TROUBLE RELAXING: NOT AT ALL

## 2022-08-01 NOTE — TELEPHONE ENCOUNTER
FYI to provider - Patient is lost to pap tracking follow-up. Attempts to contact pt have been made per reminder process and there has been no reply and/or no appt scheduled.       6/17/21 NIL, 21 yr old. Plan 1 yr repeat pap due to immunosuppression  06/01/22 Reminder MyChart   06/29/22 Reminder call - pt notified  8/1/22 lost to follow up     Pinky Dubon RN, BSN

## 2022-08-03 NOTE — TELEPHONE ENCOUNTER
Left message asking pt to hold medication and return call to clinic, once she has seen ophthalmology.    Radha Hendrix RN  Rheumatology Clinic

## 2022-08-11 ENCOUNTER — OFFICE VISIT (OUTPATIENT)
Dept: OBGYN | Facility: CLINIC | Age: 22
End: 2022-08-11
Attending: NURSE PRACTITIONER
Payer: COMMERCIAL

## 2022-08-11 VITALS
BODY MASS INDEX: 25.4 KG/M2 | WEIGHT: 138 LBS | DIASTOLIC BLOOD PRESSURE: 115 MMHG | HEART RATE: 84 BPM | SYSTOLIC BLOOD PRESSURE: 151 MMHG | HEIGHT: 62 IN

## 2022-08-11 DIAGNOSIS — D84.9 IMMUNOCOMPROMISED (H): ICD-10-CM

## 2022-08-11 DIAGNOSIS — Z30.011 ENCOUNTER FOR INITIAL PRESCRIPTION OF CONTRACEPTIVE PILLS: ICD-10-CM

## 2022-08-11 DIAGNOSIS — Z01.419 ENCOUNTER FOR GYNECOLOGICAL EXAMINATION WITHOUT ABNORMAL FINDING: Primary | ICD-10-CM

## 2022-08-11 DIAGNOSIS — Z12.4 SCREENING FOR CERVICAL CANCER: ICD-10-CM

## 2022-08-11 DIAGNOSIS — R10.2 PELVIC PAIN IN FEMALE: ICD-10-CM

## 2022-08-11 PROCEDURE — G0101 CA SCREEN;PELVIC/BREAST EXAM: HCPCS | Performed by: NURSE PRACTITIONER

## 2022-08-11 PROCEDURE — G0463 HOSPITAL OUTPT CLINIC VISIT: HCPCS

## 2022-08-11 PROCEDURE — G0145 SCR C/V CYTO,THINLAYER,RESCR: HCPCS | Performed by: NURSE PRACTITIONER

## 2022-08-11 RX ORDER — ACETAMINOPHEN AND CODEINE PHOSPHATE 120; 12 MG/5ML; MG/5ML
0.35 SOLUTION ORAL DAILY
Qty: 84 TABLET | Refills: 3 | Status: SHIPPED | OUTPATIENT
Start: 2022-08-11 | End: 2023-07-19

## 2022-08-11 RX ORDER — DOXYCYCLINE 100 MG/1
CAPSULE ORAL
COMMUNITY
Start: 2022-05-10 | End: 2022-08-11

## 2022-08-11 ASSESSMENT — PAIN SCALES - GENERAL: PAINLEVEL: NO PAIN (0)

## 2022-08-11 NOTE — LETTER
2022       RE: Cathy Freedman  [id number] [id number]     Dear Colleague,    Thank you for referring your patient, Cathy Freedman, to the [facility] [place] at [facility]. Please see a copy of my visit note below.    SUBJECTIVE:  Cathy Freedman is a 22 yr old female, , who presents to clinic today for a gynecologic preventive health visit and refill on her birth control. Her medical history is significant for Lupus nephritis, renal HTN, and SLE.      1. Last pap smear: 2021 NIL --> needs yearly pap smears due to immunosuppression    2. Birth control: Cathy has been taking COCs. Of note, she started having elevated BPs in .  Upon chart review, her BP was 158/125  on  and 165/122 on . Having regular menstrual periods while on the COCs.  Not sexually active currently.       3. Right sided pelvic pain: has been intermittent for the last couple yrs; occurs very randomly. Last occurred a few weeks ago. Pain only lasts for a few seconds. Sharp in nature. Denies abnormal vaginal discharge, vaginal itching/ odor, constipation or diarrhea, and urinary symptoms. Declines STD testing.     PHQ 2021   PHQ-9 Total Score 0 0   Q9: Thoughts of better off dead/self-harm past 2 weeks Not at all Not at all   Some encounter information is confidential and restricted. Go to Review Flowsheets activity to see all data.     Answers for HPI/ROS submitted by the patient on 2022  ULYSSES 7 TOTAL SCORE: 0    Current Outpatient Medications   Medication     hydroxychloroquine (PLAQUENIL) 200 MG tablet     LARISSIA 0.1-20 MG-MCG tablet     norethindrone (MICRONOR) 0.35 MG tablet     vitamin D2 (ERGOCALCIFEROL) 15442 units (1250 mcg) capsule     No current facility-administered medications for this visit.     Past Medical History:   Diagnosis Date     Anemia of chronic disease      Anxious appearance  "1/3/2018     Long term systemic steroid user 10/17/2017     Lupus nephritis, ISN/RPS class IV (H)      Non-adherence to medical treatment      Psychosis (H) 1/2/2018     Renal hypertension      Skin breakdown 11/13/2017     SLE (systemic lupus erythematosus related syndrome) (H)      Past Surgical History:   Procedure Laterality Date     PERCUTANEOUS BIOPSY KIDNEY Right 10/6/2017    Procedure: PERCUTANEOUS BIOPSY KIDNEY;  Kidney Biopsy Percutaneous Right ;  Surgeon: Preston Russo MD;  Location: [facility]     Family History   Problem Relation Age of Onset     No Known Problems Mother      No Known Problems Father      No Known Problems Sister      No Known Problems Brother      No Known Problems Maternal Grandmother      No Known Problems Maternal Grandfather      No Known Problems Paternal Grandmother      No Known Problems Paternal Grandfather      Diabetes Maternal Aunt      Hypertension Paternal Uncle      Thyroid Disease Other         Cousin: hyperthyroid         OBJECTIVE:  BP (!) 151/115   Pulse 84   Ht 1.562 m (5' 1.5\")   Wt 62.6 kg (138 lb)   BMI 25.65 kg/m    General: pleasant female in no acute distress  Psych: normal mentation, well oriented  Respiratory:  Unlabored breathing  Musculoskeletal: no gross deformities  Pelvic Exam:  Vulva: No external lesions, normal hair distribution, no adenopathy  Vagina: Moist, pink, no abnormal discharge, well rugated, no lesions  Cervix: Pap smear is taken, nulliparous, smooth, pink, no visible lesions  Rectal exam: normal anus    ASSESSMENT:  Encounter Diagnoses   Name Primary?     Screening for cervical cancer      Encounter for initial prescription of contraceptive pills      Pelvic pain in female      Encounter for gynecological examination without abnormal finding Yes     Immunocompromised (H)      PLAN:  Counseled on recommendation to receive HPV vaccine series (3 doses). Evidence of first dose being completed in the [place] Immunization records; pt will " check with her parents and/or pediatrician to see if the 2nd and 3rd were received.      Pap smear done today    Pt declined STD testing    Breast exams to start at age 24     Ordered pelvic US to evaluate for structural cause of intermittent right sided pelvic pain. Pt will be contacted with results.     Counseled pt that continuation of COCs (estrogen containing birth control) is not safe given her elevated BPs on 3 occasions in the last 3 months. Pt expressed understanding. Reviewed progesterone only and non-hormonal methods. Pt desires to switch to POPs today. Counseled on how to take these, benefits, risks and side effects. If starting within 5 days of period, no need for back-up method, however, if starting outside that window 7 days of back-up method is recommended (pt is not currently sexually active). Recommended pt follow-up with her rheumatologist or internal medicine for further BP evaluation and management.     Pt expressed understanding and agreement with the plan for care.    Shira Gonzales, DNP, APRN, WHNP

## 2022-08-11 NOTE — PROGRESS NOTES
SUBJECTIVE:  Cathy Freedman is a 22 yr old female, , who presents to clinic today for a gynecologic preventive health visit and refill on her birth control. Her medical history is significant for Lupus nephritis, renal HTN, and SLE.      1. Last pap smear: 2021 NIL --> needs yearly pap smears due to immunosuppression    2. Birth control: Cathy has been taking COCs. Of note, she started having elevated BPs in .  Upon chart review, her BP was 158/125  on  and 165/122 on . Having regular menstrual periods while on the COCs.  Not sexually active currently.       3. Right sided pelvic pain: has been intermittent for the last couple yrs; occurs very randomly. Last occurred a few weeks ago. Pain only lasts for a few seconds. Sharp in nature. Denies abnormal vaginal discharge, vaginal itching/ odor, constipation or diarrhea, and urinary symptoms. Declines STD testing.     PHQ 2021   PHQ-9 Total Score 0 0   Q9: Thoughts of better off dead/self-harm past 2 weeks Not at all Not at all   Some encounter information is confidential and restricted. Go to Review Flowsheets activity to see all data.     Answers for HPI/ROS submitted by the patient on 2022  ULYSSES 7 TOTAL SCORE: 0    Current Outpatient Medications   Medication     hydroxychloroquine (PLAQUENIL) 200 MG tablet     LARISSIA 0.1-20 MG-MCG tablet     norethindrone (MICRONOR) 0.35 MG tablet     vitamin D2 (ERGOCALCIFEROL) 42203 units (1250 mcg) capsule     No current facility-administered medications for this visit.     Past Medical History:   Diagnosis Date     Anemia of chronic disease      Anxious appearance 1/3/2018     Long term systemic steroid user 10/17/2017     Lupus nephritis, ISN/RPS class IV (H)      Non-adherence to medical treatment      Psychosis (H) 2018     Renal hypertension      Skin breakdown 2017     SLE (systemic lupus erythematosus related syndrome) (H)      Past Surgical History:   Procedure Laterality  "Date     PERCUTANEOUS BIOPSY KIDNEY Right 10/6/2017    Procedure: PERCUTANEOUS BIOPSY KIDNEY;  Kidney Biopsy Percutaneous Right ;  Surgeon: Preston Russo MD;  Location: [facility]     Family History   Problem Relation Age of Onset     No Known Problems Mother      No Known Problems Father      No Known Problems Sister      No Known Problems Brother      No Known Problems Maternal Grandmother      No Known Problems Maternal Grandfather      No Known Problems Paternal Grandmother      No Known Problems Paternal Grandfather      Diabetes Maternal Aunt      Hypertension Paternal Uncle      Thyroid Disease Other         Cousin: hyperthyroid         OBJECTIVE:  BP (!) 151/115   Pulse 84   Ht 1.562 m (5' 1.5\")   Wt 62.6 kg (138 lb)   BMI 25.65 kg/m    General: pleasant female in no acute distress  Psych: normal mentation, well oriented  Respiratory:  Unlabored breathing  Musculoskeletal: no gross deformities  Pelvic Exam:  Vulva: No external lesions, normal hair distribution, no adenopathy  Vagina: Moist, pink, no abnormal discharge, well rugated, no lesions  Cervix: Pap smear is taken, nulliparous, smooth, pink, no visible lesions  Rectal exam: normal anus    ASSESSMENT:  Encounter Diagnoses   Name Primary?     Screening for cervical cancer      Encounter for initial prescription of contraceptive pills      Pelvic pain in female      Encounter for gynecological examination without abnormal finding Yes     Immunocompromised (H)      PLAN:  Counseled on recommendation to receive HPV vaccine series (3 doses). Evidence of first dose being completed in the [place] Immunization records; pt will check with her parents and/or pediatrician to see if the 2nd and 3rd were received.      Pap smear done today    Pt declined STD testing    Breast exams to start at age 24     Ordered pelvic US to evaluate for structural cause of intermittent right sided pelvic pain. Pt will be contacted with results.     Counseled pt that " continuation of COCs (estrogen containing birth control) is not safe given her elevated BPs on 3 occasions in the last 3 months. Pt expressed understanding. Reviewed progesterone only and non-hormonal methods. Pt desires to switch to POPs today. Counseled on how to take these, benefits, risks and side effects. If starting within 5 days of period, no need for back-up method, however, if starting outside that window 7 days of back-up method is recommended (pt is not currently sexually active). Recommended pt follow-up with her rheumatologist or internal medicine for further BP evaluation and management.     Pt expressed understanding and agreement with the plan for care.    Shira Gonzales, DNP, APRN, WHNP

## 2022-08-13 DIAGNOSIS — N92.6 IRREGULAR PERIODS/MENSTRUAL CYCLES: ICD-10-CM

## 2022-08-15 LAB
BKR LAB AP GYN ADEQUACY: NORMAL
BKR LAB AP GYN INTERPRETATION: NORMAL
BKR LAB AP HPV REFLEX: NO
BKR LAB AP PREVIOUS ABNORMAL: NORMAL
PATH REPORT.COMMENTS IMP SPEC: NORMAL
PATH REPORT.COMMENTS IMP SPEC: NORMAL
PATH REPORT.RELEVANT HX SPEC: NORMAL

## 2022-08-15 RX ORDER — LEVONORGESTREL AND ETHINYL ESTRADIOL 0.1-0.02MG
KIT ORAL
Qty: 84 TABLET | Refills: 1 | OUTPATIENT
Start: 2022-08-15

## 2022-08-15 NOTE — TELEPHONE ENCOUNTER
"Requested Prescriptions   Pending Prescriptions Disp Refills     LARISSIA 0.1-20 MG-MCG tablet [Pharmacy Med Name: LARISSIA-28 TABLET] 84 tablet 1     Sig: TAKE 1 TABLET BY MOUTH EVERY DAY       Contraceptives Protocol Passed - 8/13/2022 12:31 PM        Passed - Patient is not a current smoker if age is 35 or older        Passed - Recent (12 mo) or future (30 days) visit within the authorizing provider's specialty     Patient has had an office visit with the authorizing provider or a provider within the authorizing providers department within the previous 12 mos or has a future within next 30 days. See \"Patient Info\" tab in inbasket, or \"Choose Columns\" in Meds & Orders section of the refill encounter.              Passed - Medication is active on med list        Passed - No active pregnancy on record        Passed - No positive pregnancy test in past 12 months           Adjustment in medication  Anusha Gregory RN on 8/15/2022 at 11:15 AM    "

## 2022-08-16 ASSESSMENT — PATIENT HEALTH QUESTIONNAIRE - PHQ9: SUM OF ALL RESPONSES TO PHQ QUESTIONS 1-9: 0

## 2022-09-10 ENCOUNTER — HEALTH MAINTENANCE LETTER (OUTPATIENT)
Age: 22
End: 2022-09-10

## 2022-09-16 ENCOUNTER — TRANSFERRED RECORDS (OUTPATIENT)
Dept: HEALTH INFORMATION MANAGEMENT | Facility: CLINIC | Age: 22
End: 2022-09-16

## 2022-11-28 ENCOUNTER — HOSPITAL ENCOUNTER (EMERGENCY)
Facility: CLINIC | Age: 22
Discharge: HOME | End: 2022-11-28
Attending: EMERGENCY MEDICINE | Admitting: EMERGENCY MEDICINE
Payer: COMMERCIAL

## 2022-11-28 ENCOUNTER — TELEPHONE (OUTPATIENT)
Dept: OTOLARYNGOLOGY | Facility: CLINIC | Age: 22
End: 2022-11-28

## 2022-11-28 VITALS
BODY MASS INDEX: 26.06 KG/M2 | SYSTOLIC BLOOD PRESSURE: 157 MMHG | WEIGHT: 138 LBS | TEMPERATURE: 98.8 F | OXYGEN SATURATION: 100 % | RESPIRATION RATE: 20 BRPM | HEART RATE: 91 BPM | DIASTOLIC BLOOD PRESSURE: 115 MMHG | HEIGHT: 61 IN

## 2022-11-28 DIAGNOSIS — H60.11 CELLULITIS OF AURICLE OF RIGHT EAR: ICD-10-CM

## 2022-11-28 PROCEDURE — 69200 CLEAR OUTER EAR CANAL: CPT | Mod: RT

## 2022-11-28 PROCEDURE — 250N000009 HC RX 250

## 2022-11-28 PROCEDURE — 250N000013 HC RX MED GY IP 250 OP 250 PS 637: Performed by: EMERGENCY MEDICINE

## 2022-11-28 PROCEDURE — 99283 EMERGENCY DEPT VISIT LOW MDM: CPT | Mod: 25

## 2022-11-28 RX ORDER — SULFAMETHOXAZOLE/TRIMETHOPRIM 800-160 MG
1 TABLET ORAL ONCE
Status: COMPLETED | OUTPATIENT
Start: 2022-11-28 | End: 2022-11-28

## 2022-11-28 RX ORDER — LIDOCAINE HYDROCHLORIDE 10 MG/ML
INJECTION, SOLUTION EPIDURAL; INFILTRATION; INTRACAUDAL; PERINEURAL
Status: COMPLETED
Start: 2022-11-28 | End: 2022-11-28

## 2022-11-28 RX ORDER — SULFAMETHOXAZOLE/TRIMETHOPRIM 800-160 MG
1 TABLET ORAL 2 TIMES DAILY
Qty: 20 TABLET | Refills: 0 | Status: SHIPPED | OUTPATIENT
Start: 2022-11-28 | End: 2022-12-08

## 2022-11-28 RX ADMIN — LIDOCAINE HYDROCHLORIDE 50 MG: 10 INJECTION, SOLUTION EPIDURAL; INFILTRATION; INTRACAUDAL; PERINEURAL at 01:54

## 2022-11-28 RX ADMIN — SULFAMETHOXAZOLE AND TRIMETHOPRIM 1 TABLET: 800; 160 TABLET ORAL at 01:54

## 2022-11-28 ASSESSMENT — ENCOUNTER SYMPTOMS
HEADACHES: 0
COLOR CHANGE: 1
WEAKNESS: 0
SHORTNESS OF BREATH: 0
NUMBNESS: 0
ABDOMINAL PAIN: 0

## 2022-11-28 ASSESSMENT — ACTIVITIES OF DAILY LIVING (ADL): ADLS_ACUITY_SCORE: 33

## 2022-11-28 NOTE — ED PROVIDER NOTES
"  History   Chief Complaint:  Ear Pain       HPI   Cathy Freedman is a 22 year old female with history of lupus who presents with ear pain. The patient reports she got a new earring in her right ear. The area has since become red and painful leading to ED visit. She cannot get the earring out as it is swollen. She notes some drainage from around the earring site. She denies hearing loss with this. No other concerns.     Review of Systems   HENT: Positive for ear discharge and ear pain (Right ear). Negative for hearing loss.    Eyes: Negative for visual disturbance.   Respiratory: Negative for shortness of breath.    Cardiovascular: Negative for chest pain.   Gastrointestinal: Negative for abdominal pain.   Genitourinary: Negative.    Skin: Positive for color change (Redness).   Neurological: Negative for weakness, numbness and headaches.   All other systems reviewed and are negative.      Allergies:  Penicillin G    Medications:  Plaquenil  Micronor     Past Medical History:     Systemic lupus erythematosus   Hypertension   Immunosuppression   Lupus nephritis      Past Surgical History:    Kidney biopsy     Social History:  The patient presents to the ED alone  PCP: Jacey Fuchs     Physical Exam     Patient Vitals for the past 24 hrs:   BP Temp Temp src Pulse Resp SpO2 Height Weight   11/28/22 0039 (!) 157/123 98.8  F (37.1  C) Oral 98 20 96 % 1.549 m (5' 1\") 62.6 kg (138 lb)       Physical Exam  General: Sitting on the ED chair, no distress  HEENT: Normocephalic, atraumatic, erythematous swollen right ear with significant edema surrounding piercing of the cartilage, no fluctuance no purulent drainage, small amount of brown crusting near the posterior aspect of the piercing  Cardiac: Warm and well perfused  Pulm: Breathing comfortably, no accessory muscle usage, no conversational dyspnea  MSK: No deformities  Skin: Warm and dry  Neuro: Moves all extremities  Psych: Normal mood and affect     Emergency " Department Course     Procedures     Foreign Body Removal     Procedure: Foreign Body Removal    Consent: Verbal    Risks Discussed: pain, bleeding, damage to adjacent structures, incomplete removal, infection and repeat attempts    Indication: Foreign body     Location: Ear    Preparation: Alcohol    Anesthesia/Sedation: Lidocaine - 1%    Procedure Detail:   Technique instruments: Earring was pulled out after numbing.   Description: The foreign body was located and removed.     Patient Status: The patient tolerated the procedure well: Yes. There were no complications.      Emergency Department Course:           Reviewed:  I reviewed nursing notes, vitals, past medical history and Care Everywhere    Assessments:  0120 I obtained history and examined the patient as noted above. I numbed the ear and removed the earring as above.    Interventions:  Bactrim, 1 tablet, PO    Disposition:  The patient was discharged to home.     Impression & Plan   Medical Decision Makin-year-old female with history of lupus presents with right ear cellulitis secondary to a recent piercing.  She is well-appearing, not systemically ill.  The infected piercing was removed as documented above.  I attempted to express pus from the edematous erythematous area surrounding the piercing site unsuccessfully.  Given the amount of swelling and cosmetic importance of the area, a referral to ENT was placed for follow-up.  Plan at this time is for discharge home with a prescription for Bactrim.      Diagnosis:    ICD-10-CM    1. Cellulitis of auricle of right ear  H60.11 Adult ENT  Referral          Discharge Medications:  New Prescriptions    SULFAMETHOXAZOLE-TRIMETHOPRIM (BACTRIM DS) 800-160 MG TABLET    Take 1 tablet by mouth 2 times daily for 10 days       Scribe Disclosure:  Tenzin JANE, am serving as a scribe at 1:12 AM on 2022 to document services personally performed by Espinoza Roman MD based on my observations and  the provider's statements to me.            Espinoza Roman MD  11/28/22 0252     1 = Total assistance

## 2022-11-28 NOTE — ED TRIAGE NOTES
Here for concern right outer ear pain, redness, and swelling started about 2 days ago. Stated got right ear head last Sunday. ABCs intact.      Triage Assessment     Row Name 11/28/22 0038       Triage Assessment (Adult)    Airway WDL WDL       Respiratory WDL    Respiratory WDL WDL       Cardiac WDL    Cardiac WDL WDL

## 2022-11-28 NOTE — TELEPHONE ENCOUNTER
FUTURE VISIT INFORMATION      FUTURE VISIT INFORMATION:    Date: 12/16/22    Time: 1pm    Location: [facility] [place]  REFERRAL INFORMATION:    Referring provider:   Espinoza Roman MD    Referring providers clinic:  [facility]    Reason for visit/diagnosis  Per pt/ Cellulitis of auricle of right ear/ Ref Espinoza Roman MD/ med rec in [facility]    RECORDS REQUESTED FROM:       Clinic name Comments Records Status Imaging Status   [facility]  11/28/22- note from Espinoza Roman MD Epic

## 2022-12-14 DIAGNOSIS — M32.9 SYSTEMIC LUPUS ERYTHEMATOSUS, UNSPECIFIED SLE TYPE, UNSPECIFIED ORGAN INVOLVEMENT STATUS (H): ICD-10-CM

## 2022-12-14 NOTE — LETTER
Dear Cathy Freedman,     Regular eye exams are required while taking hydroxychloroquine (Plaquenil). Eye exams should be completed by an eye specialist who is experienced in monitoring for hydroxychloroquine toxicity (a rare effect of the drug that can damage your eyes and vision).  These may be yearly or as determined by your eye specialist.     Although vision problems and loss of sight while taking hydroxychloroquine are very rare, notify your doctor immediately if you notice changes in your vision. The goal of screening is to detect toxicity before your vision is significantly or noticeably impacted. Failing to get proper screening exams puts you at risk of vision changes which may or may not be reversible.    Per the American Academy of Ophthalmology recommendations (2016), screening tests performed may include a 10-2 visual field test, spectral-domain optical coherence tomography (SD OCT), or other screening tests as determined by the eye specialist, including a multifocal electroretinogram (mfERG) or fundus auto-fluorescence (FAF).    We received a refill request from your pharmacy. A copy of your current eye exam was not found in your medical record. Your hydroxychloroquine prescription has been refilled with a limited supply pending confirmation you have had an eye exam to test for hydroxychloroquine toxicity.      We encourage you to bring this letter to your eye exam to discuss the exams that will be performed during your visit. Please request your eye clinic fax or mail a copy of your eye exam report to our clinic indicating that testing was completed for hydroxychloroquine toxicity screening. The exam notes must specifically comment on the potential for hydroxychloroquine toxicity and outline recommended follow-up.    If you have questions about hydroxychloroquine or the information in this letter, please call the clinic at [phone] or talk to your provider at your next office  visit.                        1    Eye Specialist Letter  Dear Eye Specialist,  To ensure safe use of Plaquenil (hydroxychloroquine), we are requesting your assistance in providing the following information. Please return this form to our clinic via mail or fax, or incorporate this information into your visit summary. Your note must indicate whether or not there is evidence of toxicity from Plaquenil use. For questions regarding this form, please call [phone].  Patient Name:   :             Date of Exam:    The following exams were completed during this visit in accordance with the American Academy of Ophthalmology recommendations (2016):  ? 10-2 automated visual field  ? Spectral-domain optical coherence tomography (SD OCT)  ? Multifocal electroretinogram (mfERG)  ? Fundus autofluorescence (FAF)  ? Other (please specify)  Please select from the following:  ? The findings from the above exams are not suggestive of toxicity from Plaquenil (hydroxychloroquine).  ? The findings from the above exams may suggest toxicity from Plaquenil (hydroxychloroquine).  Directly contact the clinic and fax this form.  Please provide additional guidance on whether or not the medication may be continued from your perspective:  Date of next recommended Plaquenil (hydroxychloroquine) screening eye exam:   ? 5 years  ? 1 year  ? 6 months  Other (please specify):   Eye Specialist Name (print):                                                                Date:  Eye Specialist Signature:

## 2022-12-15 NOTE — TELEPHONE ENCOUNTER
HYDROXYCHLOROQUINE 200 MG TAB  Plaquenil      Last Written Prescription Date:   6/28/2022  Last Fill Quantity: 90,   # refills: 1  Last Office Visit:  6/20/2022  Future Office visit:  6/23/2023  Last Eye Exam: ???  Routing refill request to provider for review/approval because:  Not able to find updated eye exam  Refer to clinic for further review.        Jamaica Sanchez RN  Central Triage Red Flags/Med Refills

## 2022-12-16 ENCOUNTER — PRE VISIT (OUTPATIENT)
Dept: OTOLARYNGOLOGY | Facility: CLINIC | Age: 22
End: 2022-12-16

## 2022-12-19 NOTE — TELEPHONE ENCOUNTER
Last Eye Exam:     Clinic review has been performed and the following has been reviewed: Health Maintenance, Care Everywhere and Media Tab. Current eye exam within the last year was not found.     No updated eye exam found - Reminder letter sent to patient via [employer].     Refill sent to provider for review.                 Lois Bermudez CMA   12/19/2022 10:13 AM

## 2022-12-20 RX ORDER — HYDROXYCHLOROQUINE SULFATE 200 MG/1
200 TABLET, FILM COATED ORAL DAILY
Qty: 90 TABLET | Refills: 0 | Status: SHIPPED | OUTPATIENT
Start: 2022-12-20 | End: 2023-03-20

## 2022-12-22 ENCOUNTER — LAB (OUTPATIENT)
Dept: LAB | Facility: CLINIC | Age: 22
End: 2022-12-22
Payer: COMMERCIAL

## 2022-12-22 DIAGNOSIS — E55.9 VITAMIN D DEFICIENCY: ICD-10-CM

## 2022-12-22 DIAGNOSIS — M32.9 SYSTEMIC LUPUS ERYTHEMATOSUS, UNSPECIFIED SLE TYPE, UNSPECIFIED ORGAN INVOLVEMENT STATUS (H): ICD-10-CM

## 2022-12-22 LAB
ALBUMIN MFR UR ELPH: 25.7 MG/DL
ALBUMIN SERPL BCG-MCNC: 4.5 G/DL (ref 3.5–5.2)
ALBUMIN UR-MCNC: 30 MG/DL
ALT SERPL W P-5'-P-CCNC: 15 U/L (ref 10–35)
APPEARANCE UR: CLEAR
AST SERPL W P-5'-P-CCNC: 23 U/L (ref 10–35)
BACTERIA #/AREA URNS HPF: ABNORMAL /HPF
BASOPHILS # BLD AUTO: 0.1 10E3/UL (ref 0–0.2)
BASOPHILS NFR BLD AUTO: 1 %
BILIRUB UR QL STRIP: NEGATIVE
COLOR UR AUTO: YELLOW
CREAT SERPL-MCNC: 0.73 MG/DL (ref 0.51–0.95)
CREAT UR-MCNC: 228 MG/DL
CRP SERPL-MCNC: <3 MG/L
EOSINOPHIL # BLD AUTO: 0.6 10E3/UL (ref 0–0.7)
EOSINOPHIL NFR BLD AUTO: 6 %
ERYTHROCYTE [DISTWIDTH] IN BLOOD BY AUTOMATED COUNT: 12.3 % (ref 10–15)
ERYTHROCYTE [SEDIMENTATION RATE] IN BLOOD BY WESTERGREN METHOD: 6 MM/HR (ref 0–20)
GFR SERPL CREATININE-BSD FRML MDRD: >90 ML/MIN/1.73M2
GLUCOSE UR STRIP-MCNC: NEGATIVE MG/DL
HCT VFR BLD AUTO: 47 % (ref 35–47)
HGB BLD-MCNC: 15.5 G/DL (ref 11.7–15.7)
HGB UR QL STRIP: NEGATIVE
IMM GRANULOCYTES # BLD: 0 10E3/UL
IMM GRANULOCYTES NFR BLD: 0 %
KETONES UR STRIP-MCNC: NEGATIVE MG/DL
LEUKOCYTE ESTERASE UR QL STRIP: NEGATIVE
LYMPHOCYTES # BLD AUTO: 3.9 10E3/UL (ref 0.8–5.3)
LYMPHOCYTES NFR BLD AUTO: 39 %
MCH RBC QN AUTO: 28.8 PG (ref 26.5–33)
MCHC RBC AUTO-ENTMCNC: 33 G/DL (ref 31.5–36.5)
MCV RBC AUTO: 87 FL (ref 78–100)
MONOCYTES # BLD AUTO: 0.4 10E3/UL (ref 0–1.3)
MONOCYTES NFR BLD AUTO: 4 %
MUCOUS THREADS #/AREA URNS LPF: PRESENT /LPF
NEUTROPHILS # BLD AUTO: 4.9 10E3/UL (ref 1.6–8.3)
NEUTROPHILS NFR BLD AUTO: 50 %
NITRATE UR QL: NEGATIVE
PH UR STRIP: 5.5 [PH] (ref 5–7)
PLATELET # BLD AUTO: 304 10E3/UL (ref 150–450)
PROT/CREAT 24H UR: 0.11 MG/MG CR (ref 0–0.2)
RBC # BLD AUTO: 5.38 10E6/UL (ref 3.8–5.2)
RBC #/AREA URNS AUTO: ABNORMAL /HPF
SP GR UR STRIP: >=1.03 (ref 1–1.03)
SQUAMOUS #/AREA URNS AUTO: ABNORMAL /LPF
UROBILINOGEN UR STRIP-ACNC: 0.2 E.U./DL
WBC # BLD AUTO: 9.8 10E3/UL (ref 4–11)
WBC #/AREA URNS AUTO: ABNORMAL /HPF

## 2022-12-22 PROCEDURE — 84156 ASSAY OF PROTEIN URINE: CPT

## 2022-12-22 PROCEDURE — 86160 COMPLEMENT ANTIGEN: CPT

## 2022-12-22 PROCEDURE — 85652 RBC SED RATE AUTOMATED: CPT

## 2022-12-22 PROCEDURE — 84450 TRANSFERASE (AST) (SGOT): CPT

## 2022-12-22 PROCEDURE — 81001 URINALYSIS AUTO W/SCOPE: CPT

## 2022-12-22 PROCEDURE — 82306 VITAMIN D 25 HYDROXY: CPT

## 2022-12-22 PROCEDURE — 36415 COLL VENOUS BLD VENIPUNCTURE: CPT

## 2022-12-22 PROCEDURE — 84460 ALANINE AMINO (ALT) (SGPT): CPT

## 2022-12-22 PROCEDURE — 86160 COMPLEMENT ANTIGEN: CPT | Mod: 59

## 2022-12-22 PROCEDURE — 82565 ASSAY OF CREATININE: CPT

## 2022-12-22 PROCEDURE — 85025 COMPLETE CBC W/AUTO DIFF WBC: CPT

## 2022-12-22 PROCEDURE — 86140 C-REACTIVE PROTEIN: CPT

## 2022-12-22 PROCEDURE — 86225 DNA ANTIBODY NATIVE: CPT

## 2022-12-22 PROCEDURE — 82040 ASSAY OF SERUM ALBUMIN: CPT

## 2022-12-23 DIAGNOSIS — E55.9 VITAMIN D DEFICIENCY: ICD-10-CM

## 2022-12-23 LAB
C3 SERPL-MCNC: 95 MG/DL (ref 81–157)
C4 SERPL-MCNC: 16 MG/DL (ref 13–39)
DEPRECATED CALCIDIOL+CALCIFEROL SERPL-MC: 21 UG/L (ref 20–75)
DSDNA AB SER-ACNC: 27 IU/ML

## 2022-12-23 RX ORDER — ERGOCALCIFEROL 1.25 MG/1
50000 CAPSULE, LIQUID FILLED ORAL
Qty: 12 CAPSULE | Refills: 0 | Status: SHIPPED | OUTPATIENT
Start: 2022-12-23 | End: 2023-06-23

## 2023-01-11 ENCOUNTER — OFFICE VISIT (OUTPATIENT)
Dept: FAMILY MEDICINE | Facility: CLINIC | Age: 23
End: 2023-01-11
Payer: COMMERCIAL

## 2023-01-11 ENCOUNTER — TELEPHONE (OUTPATIENT)
Dept: FAMILY MEDICINE | Facility: CLINIC | Age: 23
End: 2023-01-11

## 2023-01-11 ENCOUNTER — NURSE TRIAGE (OUTPATIENT)
Dept: NURSING | Facility: CLINIC | Age: 23
End: 2023-01-11

## 2023-01-11 VITALS
HEART RATE: 82 BPM | WEIGHT: 136 LBS | SYSTOLIC BLOOD PRESSURE: 145 MMHG | HEIGHT: 61 IN | RESPIRATION RATE: 18 BRPM | BODY MASS INDEX: 25.68 KG/M2 | OXYGEN SATURATION: 98 % | TEMPERATURE: 98.3 F | DIASTOLIC BLOOD PRESSURE: 97 MMHG

## 2023-01-11 DIAGNOSIS — M79.602 LEFT ARM PAIN: Primary | ICD-10-CM

## 2023-01-11 DIAGNOSIS — R03.0 ELEVATED BLOOD PRESSURE READING: ICD-10-CM

## 2023-01-11 LAB
CK SERPL-CCNC: 107 U/L (ref 30–225)
CRP SERPL-MCNC: <3 MG/L
D DIMER PPP FEU-MCNC: <0.27 UG/ML FEU (ref 0–0.5)

## 2023-01-11 PROCEDURE — 86140 C-REACTIVE PROTEIN: CPT | Performed by: INTERNAL MEDICINE

## 2023-01-11 PROCEDURE — 36415 COLL VENOUS BLD VENIPUNCTURE: CPT | Performed by: INTERNAL MEDICINE

## 2023-01-11 PROCEDURE — 82550 ASSAY OF CK (CPK): CPT | Performed by: INTERNAL MEDICINE

## 2023-01-11 PROCEDURE — 99214 OFFICE O/P EST MOD 30 MIN: CPT | Performed by: INTERNAL MEDICINE

## 2023-01-11 PROCEDURE — 85379 FIBRIN DEGRADATION QUANT: CPT | Performed by: INTERNAL MEDICINE

## 2023-01-11 ASSESSMENT — PAIN SCALES - GENERAL: PAINLEVEL: MILD PAIN (3)

## 2023-01-11 NOTE — PROGRESS NOTES
"  Assessment & Plan   Problem List Items Addressed This Visit    None  Visit Diagnoses     Left arm pain    -  Primary    Relevant Orders    D dimer, quantitative (Completed)    CK total (Completed)    CRP, inflammation (Completed)    Elevated blood pressure reading        Relevant Orders    24 Hour Blood Pressure Monitor - Adult         Check to D-dimer and CK, unsure etiology of her left arm pressure.  Possible musculoskeletal.  Rule out DVT although the left proximal arm is not swollen or red or hot to palpation, doubt DVT but given her history of lupus is reasonable to rule out.  Has intact neurovascular exam left upper extremity, no neck pain or axillary or supraclavicular lymphadenopathy.  No rashes.  We will continue closely observe as an outpatient.  Continue take Tylenol as needed for pain, avoid any heavy weightlifting keep rested for now.  Blood pressure elevated, do 24-hour blood pressure and follow-up to discuss.  Further recommendation pending lab results.         BMI:   Estimated body mass index is 25.7 kg/m  as calculated from the following:    Height as of this encounter: 1.549 m (5' 1\").    Weight as of this encounter: 61.7 kg (136 lb).   Weight management plan: Discussed healthy diet and exercise guidelines    See Patient Instructions    Return in about 1 week (around 1/18/2023), or if symptoms worsen or fail to improve, for As needed and if symptoms worsen.    John Garza MD  [facility] MIKE Morgan is a 22 year old presenting for the following health issues:  Arm Pain (See triage note)      Arm Pain    History of Present Illness       Reason for visit:  Arm pain  Symptom onset:  Today  Symptoms include:  Pressure im left arm  Symptom intensity:  Mild  Symptom progression:  Staying the same  Had these symptoms before:  No    She eats 2-3 servings of fruits and vegetables daily.She consumes 1 sweetened beverage(s) daily.She exercises with enough effort to " "increase her heart rate 20 to 29 minutes per day.  She exercises with enough effort to increase her heart rate 3 or less days per week.   She is taking medications regularly.     Denies any trauma to her left arm.  No swelling to left arm.  No neck pain.  No breath during event.  History of lupus on Plaquenil.  No weakness of left upper extremity per se.  No fever chills or rashes.    Review of Systems   Constitutional, HEENT, cardiovascular, pulmonary, gi and gu systems are negative, except as otherwise noted.      Objective    BP (!) 145/97 (BP Location: Right arm, Patient Position: Sitting, Cuff Size: Adult Regular)   Pulse 82   Temp 98.3  F (36.8  C)   Resp 18   Ht 1.549 m (5' 1\")   Wt 61.7 kg (136 lb)   LMP 01/08/2023 (Approximate)   SpO2 98%   BMI 25.70 kg/m    Body mass index is 25.7 kg/m .  Physical Exam   GENERAL: healthy, alert and no distress  EYES: Eyes grossly normal to inspection, PERRL and conjunctivae and sclerae normal  NECK: no adenopathy, no asymmetry, masses, or scars and thyroid normal to palpation  RESP: lungs clear to auscultation - no rales, rhonchi or wheezes  CV: regular rate and rhythm, normal S1 S2, no S3 or S4, no murmur, click or rub, no peripheral edema and peripheral pulses strong  ABDOMEN: soft, nontender, no hepatosplenomegaly, no masses and bowel sounds normal  MS: no gross musculoskeletal defects noted, no edema  NEURO: Normal strength and tone, mentation intact and speech normal  PSYCH: mentation appears normal, affect normal/bright    Lab on 12/22/2022   Component Date Value Ref Range Status     Vitamin D, Total (25-Hydroxy) 12/22/2022 21  20 - 75 ug/L Final     ALT 12/22/2022 15  10 - 35 U/L Final     Albumin 12/22/2022 4.5  3.5 - 5.2 g/dL Final     AST 12/22/2022 23  10 - 35 U/L Final     Creatinine 12/22/2022 0.73  0.51 - 0.95 mg/dL Final     GFR Estimate 12/22/2022 >90  >60 mL/min/1.73m2 Final    Effective December 21, 2021 eGFRcr in adults is calculated using the " 2021 CKD-EPI creatinine equation which includes age and gender (Venus et al., NE, DOI: 10.1056/WXCUmj5694988)     C4 Complement 12/22/2022 16  13 - 39 mg/dL Final     C3 Complement 12/22/2022 95  81 - 157 mg/dL Final     CRP Inflammation 12/22/2022 <3.00  <5.00 mg/L Final     DNA (ds) Antibody 12/22/2022 27.0 (H)  <10.0 IU/mL Final    Positive     Erythrocyte Sedimentation Rate 12/22/2022 6  0 - 20 mm/hr Final     Color Urine 12/22/2022 Yellow  Colorless, Straw, Light Yellow, Yellow Final     Appearance Urine 12/22/2022 Clear  Clear Final     Glucose Urine 12/22/2022 Negative  Negative mg/dL Final     Bilirubin Urine 12/22/2022 Negative  Negative Final     Ketones Urine 12/22/2022 Negative  Negative mg/dL Final     Specific Gravity Urine 12/22/2022 >=1.030  1.003 - 1.035 Final     Blood Urine 12/22/2022 Negative  Negative Final     pH Urine 12/22/2022 5.5  5.0 - 7.0 Final     Protein Albumin Urine 12/22/2022 30 (A)  Negative mg/dL Final     Urobilinogen Urine 12/22/2022 0.2  0.2, 1.0 E.U./dL Final     Nitrite Urine 12/22/2022 Negative  Negative Final     Leukocyte Esterase Urine 12/22/2022 Negative  Negative Final     Total Protein Urine mg/dL 12/22/2022 25.7  mg/dL Final    The reference ranges have not been established in urine protein. The results should be integrated into the clinical context for interpretation.     Total Protein UR MG/MG CR 12/22/2022 0.11  0.00 - 0.20 mg/mg Cr Final     Creatinine Urine mg/dL 12/22/2022 228.0  mg/dL Final    The reference ranges have not been established in urine creatinine. The results should be integrated into the clinical context for interpretation.     WBC Count 12/22/2022 9.8  4.0 - 11.0 10e3/uL Final     RBC Count 12/22/2022 5.38 (H)  3.80 - 5.20 10e6/uL Final     Hemoglobin 12/22/2022 15.5  11.7 - 15.7 g/dL Final     Hematocrit 12/22/2022 47.0  35.0 - 47.0 % Final     MCV 12/22/2022 87  78 - 100 fL Final     MCH 12/22/2022 28.8  26.5 - 33.0 pg Final     MCHC  12/22/2022 33.0  31.5 - 36.5 g/dL Final     RDW 12/22/2022 12.3  10.0 - 15.0 % Final     Platelet Count 12/22/2022 304  150 - 450 10e3/uL Final     % Neutrophils 12/22/2022 50  % Final     % Lymphocytes 12/22/2022 39  % Final     % Monocytes 12/22/2022 4  % Final     % Eosinophils 12/22/2022 6  % Final     % Basophils 12/22/2022 1  % Final     % Immature Granulocytes 12/22/2022 0  % Final     Absolute Neutrophils 12/22/2022 4.9  1.6 - 8.3 10e3/uL Final     Absolute Lymphocytes 12/22/2022 3.9  0.8 - 5.3 10e3/uL Final     Absolute Monocytes 12/22/2022 0.4  0.0 - 1.3 10e3/uL Final     Absolute Eosinophils 12/22/2022 0.6  0.0 - 0.7 10e3/uL Final     Absolute Basophils 12/22/2022 0.1  0.0 - 0.2 10e3/uL Final     Absolute Immature Granulocytes 12/22/2022 0.0  <=0.4 10e3/uL Final     Bacteria Urine 12/22/2022 Moderate (A)  None Seen /HPF Final     RBC Urine 12/22/2022 0-2  0-2 /HPF /HPF Final     WBC Urine 12/22/2022 0-5  0-5 /HPF /HPF Final     Squamous Epithelials Urine 12/22/2022 Few (A)  None Seen /LPF Final     Mucus Urine 12/22/2022 Present (A)  None Seen /LPF Final

## 2023-01-11 NOTE — TELEPHONE ENCOUNTER
CC: Patient calling to f/u on orders placed today by Dr. Garza after office visit for 24 HR blood pressure monitor.    Patient needing info where to get monitor, writer advised patient call [facility], gave patient phone number to call [phone].    Patient will call cardiology now. Advised patient to call back with further questions or concerns.    Kirit Cavazos RN  [facility]

## 2023-01-11 NOTE — TELEPHONE ENCOUNTER
"Nurse Triage SBAR    Is this a 2nd Level Triage? NO    Situation:     Patient calling reporting left forearm \"pressure.\"     Background:     History of Lupus    Assessment:     Patient calling reporting waking at 515 a.m. with left forearm \"pressure.\"   Rating pain level \"3\" on 1-10 pain scale.  Denies feeling shortness of breath/denies chest pain during triage. Stating she had lower rib pain in past few days.  Reporting pain improves at rest, \"pressure builds up with use.\"    Protocol Recommended Disposition:   Go To Office Now    Recommendation:     Transferred to Central Scheduling. Reviewed with patient to call with any increase or change in symptoms. Reviewed if rib pain returned patient to be evaluated in ED.  Appointment scheduled for 1040 a.m. today.    Routed to provider    Does the patient meet one of the following criteria for ADS visit consideration? 16+ years old, with an [facility] PCP     TIP  Providers, please consider if this condition is appropriate for management at one of our Acute and Diagnostic Services sites.     If patient is a good candidate, please use dotphrase <dot>triageresponse and select Refer to ADS to document.      Reason for Disposition    Arm pains with exertion (e.g., occurs with walking; goes away on resting)    Additional Information    Negative: Shock suspected (e.g., cold/pale/clammy skin, too weak to stand, low BP, rapid pulse)    Negative: Similar pain previously and it was from 'heart attack'    Negative: Similar pain previously from 'angina' and not relieved by nitroglycerin    Negative: Sounds like a life-threatening emergency to the triager    Negative: Followed an injury to arm    Negative: Chest pain    Negative: Wound looks infected    Negative: Elbow pain is main symptom    Negative: Hand or wrist pain is main symptom    Negative: Difficulty breathing or unusual sweating (e.g., sweating without exertion)    Negative: Chest pain lasting longer than 5 minutes    Negative: " Age > 40 and no obvious cause for pain, pain still present even when not moving the arm    Negative: Fever and red area (or area very tender to touch)    Negative: Swollen joint and fever    Negative: Entire arm is swollen    Negative: Patient sounds very sick or weak to the triager    Negative: SEVERE pain (e.g., excruciating, unable to do any normal activities)    Negative: Red area or streak > 2 inches (or 5 cm)    Negative: Cast on wrist or arm and now increasing pain    Negative: Weakness (i.e., loss of strength) in hand or fingers    Protocols used: ARM PAIN-A-OH

## 2023-01-31 ENCOUNTER — HOSPITAL ENCOUNTER (OUTPATIENT)
Dept: CARDIOLOGY | Facility: CLINIC | Age: 23
Discharge: HOME | End: 2023-01-31
Attending: INTERNAL MEDICINE | Admitting: INTERNAL MEDICINE
Payer: COMMERCIAL

## 2023-01-31 DIAGNOSIS — R03.0 ELEVATED BLOOD PRESSURE READING: ICD-10-CM

## 2023-01-31 PROCEDURE — 93790 AMBL BP MNTR W/SW I&R: CPT | Performed by: INTERNAL MEDICINE

## 2023-01-31 PROCEDURE — 93786 AMBL BP MNTR W/SW REC ONLY: CPT

## 2023-03-18 DIAGNOSIS — M32.9 SYSTEMIC LUPUS ERYTHEMATOSUS, UNSPECIFIED SLE TYPE, UNSPECIFIED ORGAN INVOLVEMENT STATUS (H): ICD-10-CM

## 2023-03-20 RX ORDER — HYDROXYCHLOROQUINE SULFATE 200 MG/1
200 TABLET, FILM COATED ORAL DAILY
Qty: 90 TABLET | Refills: 1 | Status: SHIPPED | OUTPATIENT
Start: 2023-03-20 | End: 2023-06-23

## 2023-03-20 NOTE — TELEPHONE ENCOUNTER
hydroxychloroquine (PLAQUENIL) 200 MG tablet  Plaquenil      Last Written Prescription Date: 12-   Last Fill Quantity: 90,   # refills: 0  Last Office Visit: 06-  Future Office visit: 06- 09-  EYE exam in MEDIA      Labs completed on :12-     Creatinine 0.51 - 0.95 mg/dL 0.73

## 2023-06-23 ENCOUNTER — OFFICE VISIT (OUTPATIENT)
Dept: RHEUMATOLOGY | Facility: CLINIC | Age: 23
End: 2023-06-23
Attending: INTERNAL MEDICINE
Payer: COMMERCIAL

## 2023-06-23 VITALS
SYSTOLIC BLOOD PRESSURE: 164 MMHG | BODY MASS INDEX: 26.22 KG/M2 | DIASTOLIC BLOOD PRESSURE: 128 MMHG | WEIGHT: 138.9 LBS | HEIGHT: 61 IN | HEART RATE: 86 BPM

## 2023-06-23 DIAGNOSIS — M32.9 SYSTEMIC LUPUS ERYTHEMATOSUS, UNSPECIFIED SLE TYPE, UNSPECIFIED ORGAN INVOLVEMENT STATUS (H): ICD-10-CM

## 2023-06-23 DIAGNOSIS — E55.9 VITAMIN D DEFICIENCY: Primary | ICD-10-CM

## 2023-06-23 DIAGNOSIS — Z79.899 LONG-TERM USE OF PLAQUENIL: ICD-10-CM

## 2023-06-23 PROCEDURE — 99213 OFFICE O/P EST LOW 20 MIN: CPT | Performed by: INTERNAL MEDICINE

## 2023-06-23 PROCEDURE — 99214 OFFICE O/P EST MOD 30 MIN: CPT | Performed by: INTERNAL MEDICINE

## 2023-06-23 RX ORDER — HYDROXYCHLOROQUINE SULFATE 200 MG/1
200 TABLET, FILM COATED ORAL DAILY
Qty: 90 TABLET | Refills: 3 | Status: SHIPPED | OUTPATIENT
Start: 2023-06-23 | End: 2024-06-07

## 2023-06-23 ASSESSMENT — PAIN SCALES - GENERAL: PAINLEVEL: NO PAIN (0)

## 2023-06-23 NOTE — LETTER
6/23/2023       RE: Cathy Freedman  [address] [address] [id number]     Dear Colleague,    Thank you for referring your patient, Cathy Freedman, to the [facility] [place] at [facility]. Please see a copy of my visit note below.    Chief Complaint   Patient presents with    RECHECK     Follow up with Lupus     Date of initial visit: 6/25/2021    Last seen: 6/20/2022    DOS: 6/23/2023    Reason for visit: SLE    Patient Active Problem List   Diagnosis    Systemic lupus erythematosus (H)    Immunosuppression (H)    SLE (systemic lupus erythematosus) (H)    Lupus nephritis, ISN/RPS class IV (H)    Hypertension    Screening for cervical cancer          Subjective:       HPI from last visit with ped rheumatology 1/22/2020:     Cathy is a 19 year old female who was seen in Pediatric Rheumatology clinic today for a follow-up visit of SLE. Cathy is accompanied today by both parents.  Cathy was last seen in clinic on 8/15/2018 by Dr. Lio Jo, at which time she was not taking all of her medications as prescribed but overall did not have any major concerns.  She was lost to follow-up there after, despite multiple attempts we could not get her to return calls or follow-up.  Today she tells me that shortly after that visit she continued to take some of her medications intermittently and is likely out of her medications by the winter 2019.  Approximately 1 to 1-1/2 years ago.  Since then she has felt increasingly well.  She denies any problems with lupus related symptoms such as fever rash joint pain leg swelling.  Her 14 system review as noted below is negative    She was worried increasingly that she may be having some active lupus and just not be aware and decided she better come back for a follow-up visit.  By her she was on the schedule I asked her to repeat her labs a few days before, those are listed below and are  remarkably normal!        6/25/2021: She was last seen by ped rheumatology in 1/2020, she was doing well off meds back then. She was not seen since then given COVID pandemic. She thinks she is doing really good. Few weeks ago, had some joint pain but she was working a lot.    No skin redness, being sluggish or fatigue anymore.    No hair loss.    Has some chronic hair loss over her temporal area.    Gets red from sun exposure, sun burn hurts more but no rashes. Uses sunscreen.    No oral ulcers.    No Joint pain.    No CP, SOB, cough or fevers.    No dry eyes, dry mouth.    No Raynaud's.    No depression.    She wants to know if her internal organs are good or not, likes to do labs.    Has been prescribed OC to control hormonal acne and menstrual cycle, not sexually active. Occasionally drinks wine with her mom. 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     6/20/2022: Cathy presents for follow up. She is doing very well.    Sometime gets sharp pain over low back by turning.    Otherwise no other complaints.    Uses Resurfx effex lumenis for stretch marks.    Today 6/23/2023:    -doing well, no flares of SLE    -reports shooting back pain x 3 mo     -aware of high BP    ROS: A comprehensive ROS was done. Positives are per HPI.      Past Medical History:   Diagnosis Date    Anemia of chronic disease     Anxious appearance 1/3/2018    Long term systemic steroid user 10/17/2017    Lupus nephritis, ISN/RPS class IV (H)     Non-adherence to medical treatment     Psychosis (H) 1/2/2018    Renal hypertension     Skin breakdown 11/13/2017    SLE (systemic lupus erythematosus related syndrome) (H)        Past Surgical History:   Procedure Laterality Date    PERCUTANEOUS BIOPSY KIDNEY Right 10/6/2017    Procedure: PERCUTANEOUS BIOPSY KIDNEY;  Kidney Biopsy Percutaneous Right ;  Surgeon: Preston Russo MD;  Location: [facility] OR       Family History   Problem Relation Age of Onset    No Known Problems Mother     No  Known Problems Father     No Known Problems Sister     No Known Problems Brother     No Known Problems Maternal Grandmother     No Known Problems Maternal Grandfather     No Known Problems Paternal Grandmother     No Known Problems Paternal Grandfather     Diabetes Maternal Aunt     Hypertension Paternal Uncle     Thyroid Disease Other         Cousin: hyperthyroid       Social History     Tobacco Use    Smoking status: Never    Smokeless tobacco: Never   Substance Use Topics    Alcohol use: Yes     Comment: rare         Allergies:     Allergies   Allergen Reactions    Penicillin G      Avoiding with diagnosis of lupus          Medications:     None       Medical --  Family -- Social History:     Past Medical History:   Diagnosis Date    Anemia of chronic disease     Anxious appearance 1/3/2018    Long term systemic steroid user 10/17/2017    Lupus nephritis, ISN/RPS class IV (H)     Non-adherence to medical treatment     Psychosis (H) 1/2/2018    Renal hypertension     Skin breakdown 11/13/2017    SLE (systemic lupus erythematosus related syndrome) (H)      Past Surgical History:   Procedure Laterality Date    PERCUTANEOUS BIOPSY KIDNEY Right 10/6/2017    Procedure: PERCUTANEOUS BIOPSY KIDNEY;  Kidney Biopsy Percutaneous Right ;  Surgeon: Preston Russo MD;  Location: [facility] OR     Family History   Problem Relation Age of Onset    No Known Problems Mother     No Known Problems Father     No Known Problems Sister     No Known Problems Brother     No Known Problems Maternal Grandmother     No Known Problems Maternal Grandfather     No Known Problems Paternal Grandmother     No Known Problems Paternal Grandfather     Diabetes Maternal Aunt     Hypertension Paternal Uncle     Thyroid Disease Other         Cousin: hyperthyroid     Social History     Social History Narrative    Family History     Father Eliseo: [employer]      Mother Lupe: Occupation      Brother Hakan 07/11/2007:  "History is negative     Sister Elizabeth 06/11/2000: History is negative     Pat Sister Latasha 09/24/1985: History is negative     Pat Sister Shira 05/15/1988: History is negative     Mat Grandfather: Anemia     Family History: Autoimmune disorder Cousin        Social History     Home/Environment    Lives with: Father, Mother, Siblings. Living situation: Home.        /School/Work    Grade level: She graduated from high school in 2017.  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          Examination:   BP (!) 164/128   Pulse 86   Ht 1.549 m (5' 1\")   Wt 63 kg (138 lb 14.4 oz)   BMI 26.24 kg/m      Constitutional: NAD, pleasant  Eyes: nl conj, sclera, EOMI  HEENT: no oral ulcers  Neck; no cervical LAP  Chest: CTAB  CV: no M/R/G, RRR  Abdomen: soft, NT  Neurologic: non focal  Psychiatric: nl affect  Skin: no rash  Musculoskeletal: no active synovitis or joint tenderness         Last Imaging Results:     Results for orders placed or performed in visit on 01/29/18   XR Chest 2 Views    Narrative    XR CHEST 2 VW  1/29/2018 9:43 AM      HISTORY: ; Systemic lupus erythematosus with lung involvement,  unspecified SLE type (H)    COMPARISON: 10/4/2017    FINDINGS: Frontal and lateral views of the chest. The cardiac  silhouette size and pulmonary vasculature are within normal limits.  There is no significant pleural effusion or pneumothorax. There are no  focal pulmonary opacities. The visualized upper abdomen and bones  appear normal.      Impression    IMPRESSION: Clear lungs.    SALEEM BECERRA MD          Last Lab Results:     No visits with results within 2 Day(s) from this visit.   Latest known visit with results is:   Hospital Outpatient Visit on 01/20/2020   Component Date Value    Sed Rate 01/20/2020 6     Complement C4 01/20/2020 19     Complement C3 01/20/2020 107     Sodium 01/20/2020 138     Potassium 01/20/2020 3.7     Chloride " 01/20/2020 108     Carbon Dioxide 01/20/2020 25     Anion Gap 01/20/2020 5     Glucose 01/20/2020 79     Urea Nitrogen 01/20/2020 12     Creatinine 01/20/2020 0.74     GFR Estimate 01/20/2020 >90     GFR Estimate If Black 01/20/2020 >90     Calcium 01/20/2020 9.2     Bilirubin Total 01/20/2020 0.5     Albumin 01/20/2020 3.6     Protein Total 01/20/2020 7.4     Alkaline Phosphatase 01/20/2020 68     ALT 01/20/2020 19     AST 01/20/2020 9     DNA-ds 01/20/2020 33*    WBC 01/20/2020 8.0     RBC Count 01/20/2020 5.31*    Hemoglobin 01/20/2020 14.9     Hematocrit 01/20/2020 46.6     MCV 01/20/2020 88     MCH 01/20/2020 28.1     MCHC 01/20/2020 32.0     RDW 01/20/2020 14.1     Platelet Count 01/20/2020 239     Diff Method 01/20/2020 Automated Method     % Neutrophils 01/20/2020 45.6     % Lymphocytes 01/20/2020 41.2     % Monocytes 01/20/2020 5.5     % Eosinophils 01/20/2020 7.0     % Basophils 01/20/2020 0.6     % Immature Granulocytes 01/20/2020 0.1     Nucleated RBCs 01/20/2020 0     Absolute Neutrophil 01/20/2020 3.7     Absolute Lymphocytes 01/20/2020 3.3     Absolute Monocytes 01/20/2020 0.4     Absolute Eosinophils 01/20/2020 0.6     Absolute Basophils 01/20/2020 0.1     Abs Immature Granulocytes 01/20/2020 0.0     Absolute Nucleated RBC 01/20/2020 0.0     Protein Random Urine 01/20/2020 0.41     Protein Total Urine g/gr* 01/20/2020 0.21*    Color Urine 01/20/2020 Yellow     Appearance Urine 01/20/2020 Clear     Glucose Urine 01/20/2020 Negative     Bilirubin Urine 01/20/2020 Negative     Ketones Urine 01/20/2020 Negative     Specific Gravity Urine 01/20/2020 1.026     Blood Urine 01/20/2020 Negative     pH Urine 01/20/2020 6.0     Protein Albumin Urine 01/20/2020 50*    Urobilinogen mg/dL 01/20/2020 Normal     Nitrite Urine 01/20/2020 Negative     Leukocyte Esterase Urine 01/20/2020 Negative     Source 01/20/2020 Midstream Urine     WBC Urine 01/20/2020 1     RBC Urine 01/20/2020 1     Squamous Epithelial /HPF*  01/20/2020 4*    Mucous Urine 01/20/2020 Present*    Creatinine Urine 01/20/2020 197      Component      Latest Ref Rng & Units 7/9/2021   WBC      4.0 - 11.0 10e9/L 9.2   RBC Count      3.8 - 5.2 10e12/L 4.80   Hemoglobin      11.7 - 15.7 g/dL 13.8   Hematocrit      35.0 - 47.0 % 41.8   MCV      78 - 100 fl 87   MCH      26.5 - 33.0 pg 28.8   MCHC      31.5 - 36.5 g/dL 33.0   RDW      10.0 - 15.0 % 14.2   Platelet Count      150 - 450 10e9/L 196   % Neutrophils      % 57.2   % Lymphocytes      % 31.5   % Monocytes      % 6.4   % Eosinophils      % 4.5   % Basophils      % 0.4   Absolute Neutrophil      1.6 - 8.3 10e9/L 5.3   Absolute Lymphocytes      0.8 - 5.3 10e9/L 2.9   Absolute Monocytes      0.0 - 1.3 10e9/L 0.6   Absolute Eosinophils      0.0 - 0.7 10e9/L 0.4   Absolute Basophils      0.0 - 0.2 10e9/L 0.0   Diff Method       Automated Method   Color Urine       Yellow   Appearance Urine       Clear   Glucose Urine      NEG:Negative mg/dL Negative   Bilirubin Urine      NEG:Negative Negative   Ketones Urine      NEG:Negative mg/dL Trace (A)   Specific Gravity Urine      1.003 - 1.035 >1.030   pH Urine      5.0 - 7.0 pH 5.5   Protein Albumin Urine      NEG:Negative mg/dL 30 (A)   Urobilinogen Urine      0.2 - 1.0 EU/dL 0.2   Nitrite Urine      NEG:Negative Negative   Blood Urine      NEG:Negative Negative   Leukocyte Esterase Urine      NEG:Negative Negative   Source       Midstream Urine   WBC Urine      OTO5:0 - 5 /HPF 0 - 5   RBC Urine      OTO2:O - 2 /HPF O - 2   Creatinine      0.52 - 1.04 mg/dL 0.94   GFR Estimate      >60 mL/min/1.73:m2 86   GFR Estimate If Black      >60 mL/min/1.73:m2 >90   Beta 2 Glycoprotein 1 Antibody IgG      <7 U/mL 0.9   Beta 2 Glycoprotein 1 Antibody IgM      <7 U/mL <2.9   Cardiolipin Antibody IgG      0.0 - 19.9 GPL-U/mL 2.2   Cardiolipin Antibody IgM      0.0 - 19.9 MPL-U/mL 1.1   Protein Random Urine      g/L 0.25   Protein Total Urine g/gr Creatinine      0 - 0.2 g/g Cr  0.13   Beta 2 Glycoprotein 1 Antibody IgA      <7 U/mL 3.1   Cardiolipin Antibody IgA      0.0 - 19.9 APL U/mL 4.9   Lupus Result      NEG:Negative Negative   Vitamin D Deficiency screening      20 - 75 ug/L 29   Creatinine Urine Random      mg/dL 195   Sed Rate      0 - 20 mm/h 9   DNA-ds      <10 IU/mL 44 (H)   CRP Inflammation      0.0 - 8.0 mg/L <2.9   Complement C3      81 - 157 mg/dL 96   Complement C4      13 - 39 mg/dL 15   AST      0 - 45 U/L 11   Albumin      3.4 - 5.0 g/dL 3.4   ALT      0 - 50 U/L 19   Creatinine Urine      mg/dL 195     Component      Latest Ref [place] 12/22/2022  2:11 PM   WBC      4.0 - 11.0 10e3/uL    RBC Count      3.80 - 5.20 10e6/uL    Hemoglobin      11.7 - 15.7 g/dL    Hematocrit      35.0 - 47.0 %    MCV      78 - 100 fL    MCH      26.5 - 33.0 pg    MCHC      31.5 - 36.5 g/dL    RDW      10.0 - 15.0 %    Platelet Count      150 - 450 10e3/uL    % Neutrophils      %    % Lymphocytes      %    % Monocytes      %    % Eosinophils      %    % Basophils      %    % Immature Granulocytes      %    Absolute Neutrophils      1.6 - 8.3 10e3/uL    Absolute Lymphocytes      0.8 - 5.3 10e3/uL    Absolute Monocytes      0.0 - 1.3 10e3/uL    Absolute Eosinophils      0.0 - 0.7 10e3/uL    Absolute Basophils      0.0 - 0.2 10e3/uL    Absolute Immature Granulocytes      <=0.4 10e3/uL    Color Urine      Colorless, Straw, Light Yellow, Yellow  Yellow    Appearance Urine      Clear  Clear    Glucose Urine      Negative mg/dL Negative    Bilirubin Urine      Negative  Negative    Ketones Urine      Negative mg/dL Negative    Specific Gravity Urine      1.003 - 1.035  >=1.030    Blood Urine      Negative  Negative    pH Urine      5.0 - 7.0  5.5    Protein Albumin Urine      Negative mg/dL 30 !    Urobilinogen Urine      0.2, 1.0 E.U./dL 0.2    Nitrite Urine      Negative  Negative    Leukocyte Esterase Urine      Negative  Negative    Bacteria Urine      None Seen /HPF Moderate !    RBC Urine       0-2 /HPF /HPF 0-2    WBC Urine      0-5 /HPF /HPF 0-5    Squamous Epithelial /LPF Urine      None Seen /LPF Few !    Mucus Urine      None Seen /LPF Present !    Total Protein Urine mg/dL      mg/dL 25.7    Total Protein UR MG/MG CR      0.00 - 0.20 mg/mg Cr 0.11    Creatinine Urine      mg/dL 228.0    Creatinine      0.51 - 0.95 mg/dL    GFR Estimate      >60 mL/min/1.73m2    Vitamin D Deficiency screening      20 - 75 ug/L    ALT      10 - 35 U/L    Albumin      3.5 - 5.2 g/dL    AST      10 - 35 U/L    Complement C4      13 - 39 mg/dL    Complement C3      81 - 157 mg/dL    CRP Inflammation      <5.00 mg/L    DNA-ds      <10.0 IU/mL    Sed Rate      0 - 20 mm/hr      Component      Latest Ref Rng 12/22/2022  2:12 PM   WBC      4.0 - 11.0 10e3/uL 9.8    RBC Count      3.80 - 5.20 10e6/uL 5.38 (H)    Hemoglobin      11.7 - 15.7 g/dL 15.5    Hematocrit      35.0 - 47.0 % 47.0    MCV      78 - 100 fL 87    MCH      26.5 - 33.0 pg 28.8    MCHC      31.5 - 36.5 g/dL 33.0    RDW      10.0 - 15.0 % 12.3    Platelet Count      150 - 450 10e3/uL 304    % Neutrophils      % 50    % Lymphocytes      % 39    % Monocytes      % 4    % Eosinophils      % 6    % Basophils      % 1    % Immature Granulocytes      % 0    Absolute Neutrophils      1.6 - 8.3 10e3/uL 4.9    Absolute Lymphocytes      0.8 - 5.3 10e3/uL 3.9    Absolute Monocytes      0.0 - 1.3 10e3/uL 0.4    Absolute Eosinophils      0.0 - 0.7 10e3/uL 0.6    Absolute Basophils      0.0 - 0.2 10e3/uL 0.1    Absolute Immature Granulocytes      <=0.4 10e3/uL 0.0    Color Urine      Colorless, Straw, Light Yellow, Yellow     Appearance Urine      Clear     Glucose Urine      Negative mg/dL    Bilirubin Urine      Negative     Ketones Urine      Negative mg/dL    Specific Gravity Urine      1.003 - 1.035     Blood Urine      Negative     pH Urine      5.0 - 7.0     Protein Albumin Urine      Negative mg/dL    Urobilinogen Urine      0.2, 1.0 E.U./dL    Nitrite Urine       Negative     Leukocyte Esterase Urine      Negative     Bacteria Urine      None Seen /HPF    RBC Urine      0-2 /HPF /HPF    WBC Urine      0-5 /HPF /HPF    Squamous Epithelial /LPF Urine      None Seen /LPF    Mucus Urine      None Seen /LPF    Total Protein Urine mg/dL      mg/dL    Total Protein UR MG/MG CR      0.00 - 0.20 mg/mg Cr    Creatinine Urine      mg/dL    Creatinine      0.51 - 0.95 mg/dL 0.73    GFR Estimate      >60 mL/min/1.73m2 >90    Vitamin D Deficiency screening      20 - 75 ug/L 21    ALT      10 - 35 U/L 15    Albumin      3.5 - 5.2 g/dL 4.5    AST      10 - 35 U/L 23    Complement C4      13 - 39 mg/dL 16    Complement C3      81 - 157 mg/dL 95    CRP Inflammation      <5.00 mg/L <3.00    DNA-ds      <10.0 IU/mL 27.0 (H)    Sed Rate      0 - 20 mm/hr 6       Legend:  ! Abnormal  (H) High         Assessment :     Cathy is a 23-year-old F with a history of quite severe systemic lupus erythematosus  Resumed HCQ in 6/2022 per lupus guideline mx and given+ anti-DNA and to prevent flares, doing well, no flares. Stable lupus labs with improved anti-DNA in 12/2022. Low vit D has been replaced.           Recommendations and follow-up:     Continue  mg every day with yearly eye exam    Follow up HTN and low back pain with PCP    Labs next month ([place])    Follow up in a year (in person)      Alberto Galvez MD    Orders Placed This Encounter   Procedures    ALT    Albumin level    AST    Creatinine    Complement C4    Complement C3    CRP inflammation    DNA double stranded antibodies    Erythrocyte sedimentation rate auto    UA with Microscopic reflex to Culture    Protein  random urine    Creatinine random urine    Vitamin D Deficiency    CBC with Platelets & Differential       Alberto Galvez MD

## 2023-06-23 NOTE — PROGRESS NOTES
Chief Complaint   Patient presents with     RECHECK     Follow up with Lupus     Date of initial visit: 6/25/2021    Last seen: 6/20/2022    DOS: 6/23/2023    Reason for visit: SLE    Patient Active Problem List   Diagnosis     Systemic lupus erythematosus (H)     Immunosuppression (H)     SLE (systemic lupus erythematosus) (H)     Lupus nephritis, ISN/RPS class IV (H)     Hypertension     Screening for cervical cancer          Subjective:       HPI from last visit with ped rheumatology 1/22/2020:     Cathy is a 19 year old female who was seen in Pediatric Rheumatology clinic today for a follow-up visit of SLE. Cathy is accompanied today by both parents.  Cathy was last seen in clinic on 8/15/2018 by Dr. Lio Jo, at which time she was not taking all of her medications as prescribed but overall did not have any major concerns.  She was lost to follow-up there after, despite multiple attempts we could not get her to return calls or follow-up.  Today she tells me that shortly after that visit she continued to take some of her medications intermittently and is likely out of her medications by the winter 2019.  Approximately 1 to 1-1/2 years ago.  Since then she has felt increasingly well.  She denies any problems with lupus related symptoms such as fever rash joint pain leg swelling.  Her 14 system review as noted below is negative    She was worried increasingly that she may be having some active lupus and just not be aware and decided she better come back for a follow-up visit.  By her she was on the schedule I asked her to repeat her labs a few days before, those are listed below and are remarkably normal!        6/25/2021: She was last seen by ped rheumatology in 1/2020, she was doing well off meds back then. She was not seen since then given COVID pandemic. She thinks she is doing really good. Few weeks ago, had some joint pain but she was working a lot.    No skin redness, being sluggish or fatigue  anymore.    No hair loss.    Has some chronic hair loss over her temporal area.    Gets red from sun exposure, sun burn hurts more but no rashes. Uses sunscreen.    No oral ulcers.    No Joint pain.    No CP, SOB, cough or fevers.    No dry eyes, dry mouth.    No Raynaud's.    No depression.    She wants to know if her internal organs are good or not, likes to do labs.    Has been prescribed OC to control hormonal acne and menstrual cycle, not sexually active. Occasionally drinks wine with her mom. 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     6/20/2022: Cathy presents for follow up. She is doing very well.    Sometime gets sharp pain over low back by turning.    Otherwise no other complaints.    Uses Resurfx effex lumenis for stretch marks.    Today 6/23/2023:    -doing well, no flares of SLE    -reports shooting back pain x 3 mo     -aware of high BP    ROS: A comprehensive ROS was done. Positives are per HPI.      Past Medical History:   Diagnosis Date     Anemia of chronic disease      Anxious appearance 1/3/2018     Long term systemic steroid user 10/17/2017     Lupus nephritis, ISN/RPS class IV (H)      Non-adherence to medical treatment      Psychosis (H) 1/2/2018     Renal hypertension      Skin breakdown 11/13/2017     SLE (systemic lupus erythematosus related syndrome) (H)        Past Surgical History:   Procedure Laterality Date     PERCUTANEOUS BIOPSY KIDNEY Right 10/6/2017    Procedure: PERCUTANEOUS BIOPSY KIDNEY;  Kidney Biopsy Percutaneous Right ;  Surgeon: Preston Russo MD;  Location: [facility] OR       Family History   Problem Relation Age of Onset     No Known Problems Mother      No Known Problems Father      No Known Problems Sister      No Known Problems Brother      No Known Problems Maternal Grandmother      No Known Problems Maternal Grandfather      No Known Problems Paternal Grandmother      No Known Problems Paternal Grandfather      Diabetes Maternal Aunt      Hypertension  Paternal Uncle      Thyroid Disease Other         Cousin: hyperthyroid       Social History     Tobacco Use     Smoking status: Never     Smokeless tobacco: Never   Substance Use Topics     Alcohol use: Yes     Comment: rare         Allergies:     Allergies   Allergen Reactions     Penicillin G      Avoiding with diagnosis of lupus          Medications:     None       Medical --  Family -- Social History:     Past Medical History:   Diagnosis Date     Anemia of chronic disease      Anxious appearance 1/3/2018     Long term systemic steroid user 10/17/2017     Lupus nephritis, ISN/RPS class IV (H)      Non-adherence to medical treatment      Psychosis (H) 1/2/2018     Renal hypertension      Skin breakdown 11/13/2017     SLE (systemic lupus erythematosus related syndrome) (H)      Past Surgical History:   Procedure Laterality Date     PERCUTANEOUS BIOPSY KIDNEY Right 10/6/2017    Procedure: PERCUTANEOUS BIOPSY KIDNEY;  Kidney Biopsy Percutaneous Right ;  Surgeon: Preston Russo MD;  Location: [facility]     Family History   Problem Relation Age of Onset     No Known Problems Mother      No Known Problems Father      No Known Problems Sister      No Known Problems Brother      No Known Problems Maternal Grandmother      No Known Problems Maternal Grandfather      No Known Problems Paternal Grandmother      No Known Problems Paternal Grandfather      Diabetes Maternal Aunt      Hypertension Paternal Uncle      Thyroid Disease Other         Cousin: hyperthyroid     Social History     Social History Narrative    Family History     Father Eliseo: Occup[employer]      Mother Lupe: Occupation      Brother Hakan 07/11/2007: History is negative     Sister Elizabeth 06/11/2000: History is negative     Pat Sister Latasha 09/24/1985: History is negative     Pat Sister Shira 05/15/1988: History is negative     Mat Grandfather: Anemia     Family History: Autoimmune disorder Cousin        Social  "History     Home/Environment    Lives with: Father, Mother, Siblings. Living situation: Home.        /School/Work    Grade level: She graduated from high school in 2017.  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          Examination:   BP (!) 164/128   Pulse 86   Ht 1.549 m (5' 1\")   Wt 63 kg (138 lb 14.4 oz)   BMI 26.24 kg/m      Constitutional: NAD, pleasant  Eyes: nl conj, sclera, EOMI  HEENT: no oral ulcers  Neck; no cervical LAP  Chest: CTAB  CV: no M/R/G, RRR  Abdomen: soft, NT  Neurologic: non focal  Psychiatric: nl affect  Skin: no rash  Musculoskeletal: no active synovitis or joint tenderness         Last Imaging Results:     Results for orders placed or performed in visit on 01/29/18   XR Chest 2 Views    Narrative    XR CHEST 2 VW  1/29/2018 9:43 AM      HISTORY: ; Systemic lupus erythematosus with lung involvement,  unspecified SLE type (H)    COMPARISON: 10/4/2017    FINDINGS: Frontal and lateral views of the chest. The cardiac  silhouette size and pulmonary vasculature are within normal limits.  There is no significant pleural effusion or pneumothorax. There are no  focal pulmonary opacities. The visualized upper abdomen and bones  appear normal.      Impression    IMPRESSION: Clear lungs.    SALEEM BECERRA MD          Last Lab Results:     No visits with results within 2 Day(s) from this visit.   Latest known visit with results is:   Hospital Outpatient Visit on 01/20/2020   Component Date Value     Sed Rate 01/20/2020 6      Complement C4 01/20/2020 19      Complement C3 01/20/2020 107      Sodium 01/20/2020 138      Potassium 01/20/2020 3.7      Chloride 01/20/2020 108      Carbon Dioxide 01/20/2020 25      Anion Gap 01/20/2020 5      Glucose 01/20/2020 79      Urea Nitrogen 01/20/2020 12      Creatinine 01/20/2020 0.74      GFR Estimate 01/20/2020 >90      GFR Estimate If Black 01/20/2020 >90      Calcium " 01/20/2020 9.2      Bilirubin Total 01/20/2020 0.5      Albumin 01/20/2020 3.6      Protein Total 01/20/2020 7.4      Alkaline Phosphatase 01/20/2020 68      ALT 01/20/2020 19      AST 01/20/2020 9      DNA-ds 01/20/2020 33*     WBC 01/20/2020 8.0      RBC Count 01/20/2020 5.31*     Hemoglobin 01/20/2020 14.9      Hematocrit 01/20/2020 46.6      MCV 01/20/2020 88      MCH 01/20/2020 28.1      MCHC 01/20/2020 32.0      RDW 01/20/2020 14.1      Platelet Count 01/20/2020 239      Diff Method 01/20/2020 Automated Method      % Neutrophils 01/20/2020 45.6      % Lymphocytes 01/20/2020 41.2      % Monocytes 01/20/2020 5.5      % Eosinophils 01/20/2020 7.0      % Basophils 01/20/2020 0.6      % Immature Granulocytes 01/20/2020 0.1      Nucleated RBCs 01/20/2020 0      Absolute Neutrophil 01/20/2020 3.7      Absolute Lymphocytes 01/20/2020 3.3      Absolute Monocytes 01/20/2020 0.4      Absolute Eosinophils 01/20/2020 0.6      Absolute Basophils 01/20/2020 0.1      Abs Immature Granulocytes 01/20/2020 0.0      Absolute Nucleated RBC 01/20/2020 0.0      Protein Random Urine 01/20/2020 0.41      Protein Total Urine g/gr* 01/20/2020 0.21*     Color Urine 01/20/2020 Yellow      Appearance Urine 01/20/2020 Clear      Glucose Urine 01/20/2020 Negative      Bilirubin Urine 01/20/2020 Negative      Ketones Urine 01/20/2020 Negative      Specific Gravity Urine 01/20/2020 1.026      Blood Urine 01/20/2020 Negative      pH Urine 01/20/2020 6.0      Protein Albumin Urine 01/20/2020 50*     Urobilinogen mg/dL 01/20/2020 Normal      Nitrite Urine 01/20/2020 Negative      Leukocyte Esterase Urine 01/20/2020 Negative      Source 01/20/2020 Midstream Urine      WBC Urine 01/20/2020 1      RBC Urine 01/20/2020 1      Squamous Epithelial /HPF* 01/20/2020 4*     Mucous Urine 01/20/2020 Present*     Creatinine Urine 01/20/2020 197      Component      Latest Ref Rng & Units 7/9/2021   WBC      4.0 - 11.0 10e9/L 9.2   RBC Count      3.8 - 5.2  10e12/L 4.80   Hemoglobin      11.7 - 15.7 g/dL 13.8   Hematocrit      35.0 - 47.0 % 41.8   MCV      78 - 100 fl 87   MCH      26.5 - 33.0 pg 28.8   MCHC      31.5 - 36.5 g/dL 33.0   RDW      10.0 - 15.0 % 14.2   Platelet Count      150 - 450 10e9/L 196   % Neutrophils      % 57.2   % Lymphocytes      % 31.5   % Monocytes      % 6.4   % Eosinophils      % 4.5   % Basophils      % 0.4   Absolute Neutrophil      1.6 - 8.3 10e9/L 5.3   Absolute Lymphocytes      0.8 - 5.3 10e9/L 2.9   Absolute Monocytes      0.0 - 1.3 10e9/L 0.6   Absolute Eosinophils      0.0 - 0.7 10e9/L 0.4   Absolute Basophils      0.0 - 0.2 10e9/L 0.0   Diff Method       Automated Method   Color Urine       Yellow   Appearance Urine       Clear   Glucose Urine      NEG:Negative mg/dL Negative   Bilirubin Urine      NEG:Negative Negative   Ketones Urine      NEG:Negative mg/dL Trace (A)   Specific Gravity Urine      1.003 - 1.035 >1.030   pH Urine      5.0 - 7.0 pH 5.5   Protein Albumin Urine      NEG:Negative mg/dL 30 (A)   Urobilinogen Urine      0.2 - 1.0 EU/dL 0.2   Nitrite Urine      NEG:Negative Negative   Blood Urine      NEG:Negative Negative   Leukocyte Esterase Urine      NEG:Negative Negative   Source       Midstream Urine   WBC Urine      OTO5:0 - 5 /HPF 0 - 5   RBC Urine      OTO2:O - 2 /HPF O - 2   Creatinine      0.52 - 1.04 mg/dL 0.94   GFR Estimate      >60 mL/min/1.73:m2 86   GFR Estimate If Black      >60 mL/min/1.73:m2 >90   Beta 2 Glycoprotein 1 Antibody IgG      <7 U/mL 0.9   Beta 2 Glycoprotein 1 Antibody IgM      <7 U/mL <2.9   Cardiolipin Antibody IgG      0.0 - 19.9 GPL-U/mL 2.2   Cardiolipin Antibody IgM      0.0 - 19.9 MPL-U/mL 1.1   Protein Random Urine      g/L 0.25   Protein Total Urine g/gr Creatinine      0 - 0.2 g/g Cr 0.13   Beta 2 Glycoprotein 1 Antibody IgA      <7 U/mL 3.1   Cardiolipin Antibody IgA      0.0 - 19.9 APL U/mL 4.9   Lupus Result      NEG:Negative Negative   Vitamin D Deficiency screening      20 -  75 ug/L 29   Creatinine Urine Random      mg/dL 195   Sed Rate      0 - 20 mm/h 9   DNA-ds      <10 IU/mL 44 (H)   CRP Inflammation      0.0 - 8.0 mg/L <2.9   Complement C3      81 - 157 mg/dL 96   Complement C4      13 - 39 mg/dL 15   AST      0 - 45 U/L 11   Albumin      3.4 - 5.0 g/dL 3.4   ALT      0 - 50 U/L 19   Creatinine Urine      mg/dL 195     Component      Latest Ref [facility] 12/22/2022  2:11 PM   WBC      4.0 - 11.0 10e3/uL    RBC Count      3.80 - 5.20 10e6/uL    Hemoglobin      11.7 - 15.7 g/dL    Hematocrit      35.0 - 47.0 %    MCV      78 - 100 fL    MCH      26.5 - 33.0 pg    MCHC      31.5 - 36.5 g/dL    RDW      10.0 - 15.0 %    Platelet Count      150 - 450 10e3/uL    % Neutrophils      %    % Lymphocytes      %    % Monocytes      %    % Eosinophils      %    % Basophils      %    % Immature Granulocytes      %    Absolute Neutrophils      1.6 - 8.3 10e3/uL    Absolute Lymphocytes      0.8 - 5.3 10e3/uL    Absolute Monocytes      0.0 - 1.3 10e3/uL    Absolute Eosinophils      0.0 - 0.7 10e3/uL    Absolute Basophils      0.0 - 0.2 10e3/uL    Absolute Immature Granulocytes      <=0.4 10e3/uL    Color Urine      Colorless, Straw, Light Yellow, Yellow  Yellow    Appearance Urine      Clear  Clear    Glucose Urine      Negative mg/dL Negative    Bilirubin Urine      Negative  Negative    Ketones Urine      Negative mg/dL Negative    Specific Gravity Urine      1.003 - 1.035  >=1.030    Blood Urine      Negative  Negative    pH Urine      5.0 - 7.0  5.5    Protein Albumin Urine      Negative mg/dL 30 !    Urobilinogen Urine      0.2, 1.0 E.U./dL 0.2    Nitrite Urine      Negative  Negative    Leukocyte Esterase Urine      Negative  Negative    Bacteria Urine      None Seen /HPF Moderate !    RBC Urine      0-2 /HPF /HPF 0-2    WBC Urine      0-5 /HPF /HPF 0-5    Squamous Epithelial /LPF Urine      None Seen /LPF Few !    Mucus Urine      None Seen /LPF Present !    Total Protein Urine mg/dL      mg/dL  25.7    Total Protein UR MG/MG CR      0.00 - 0.20 mg/mg Cr 0.11    Creatinine Urine      mg/dL 228.0    Creatinine      0.51 - 0.95 mg/dL    GFR Estimate      >60 mL/min/1.73m2    Vitamin D Deficiency screening      20 - 75 ug/L    ALT      10 - 35 U/L    Albumin      3.5 - 5.2 g/dL    AST      10 - 35 U/L    Complement C4      13 - 39 mg/dL    Complement C3      81 - 157 mg/dL    CRP Inflammation      <5.00 mg/L    DNA-ds      <10.0 IU/mL    Sed Rate      0 - 20 mm/hr      Component      Latest Ref Rn 12/22/2022  2:12 PM   WBC      4.0 - 11.0 10e3/uL 9.8    RBC Count      3.80 - 5.20 10e6/uL 5.38 (H)    Hemoglobin      11.7 - 15.7 g/dL 15.5    Hematocrit      35.0 - 47.0 % 47.0    MCV      78 - 100 fL 87    MCH      26.5 - 33.0 pg 28.8    MCHC      31.5 - 36.5 g/dL 33.0    RDW      10.0 - 15.0 % 12.3    Platelet Count      150 - 450 10e3/uL 304    % Neutrophils      % 50    % Lymphocytes      % 39    % Monocytes      % 4    % Eosinophils      % 6    % Basophils      % 1    % Immature Granulocytes      % 0    Absolute Neutrophils      1.6 - 8.3 10e3/uL 4.9    Absolute Lymphocytes      0.8 - 5.3 10e3/uL 3.9    Absolute Monocytes      0.0 - 1.3 10e3/uL 0.4    Absolute Eosinophils      0.0 - 0.7 10e3/uL 0.6    Absolute Basophils      0.0 - 0.2 10e3/uL 0.1    Absolute Immature Granulocytes      <=0.4 10e3/uL 0.0    Color Urine      Colorless, Straw, Light Yellow, Yellow     Appearance Urine      Clear     Glucose Urine      Negative mg/dL    Bilirubin Urine      Negative     Ketones Urine      Negative mg/dL    Specific Gravity Urine      1.003 - 1.035     Blood Urine      Negative     pH Urine      5.0 - 7.0     Protein Albumin Urine      Negative mg/dL    Urobilinogen Urine      0.2, 1.0 E.U./dL    Nitrite Urine      Negative     Leukocyte Esterase Urine      Negative     Bacteria Urine      None Seen /HPF    RBC Urine      0-2 /HPF /HPF    WBC Urine      0-5 /HPF /HPF    Squamous Epithelial /LPF Urine      None  Seen /LPF    Mucus Urine      None Seen /LPF    Total Protein Urine mg/dL      mg/dL    Total Protein UR MG/MG CR      0.00 - 0.20 mg/mg Cr    Creatinine Urine      mg/dL    Creatinine      0.51 - 0.95 mg/dL 0.73    GFR Estimate      >60 mL/min/1.73m2 >90    Vitamin D Deficiency screening      20 - 75 ug/L 21    ALT      10 - 35 U/L 15    Albumin      3.5 - 5.2 g/dL 4.5    AST      10 - 35 U/L 23    Complement C4      13 - 39 mg/dL 16    Complement C3      81 - 157 mg/dL 95    CRP Inflammation      <5.00 mg/L <3.00    DNA-ds      <10.0 IU/mL 27.0 (H)    Sed Rate      0 - 20 mm/hr 6       Legend:  ! Abnormal  (H) High         Assessment :     Cathy is a 23-year-old F with a history of quite severe systemic lupus erythematosus  Resumed HCQ in 6/2022 per lupus guideline mx and given+ anti-DNA and to prevent flares, doing well, no flares. Stable lupus labs with improved anti-DNA in 12/2022. Low vit D has been replaced.           Recommendations and follow-up:     Continue  mg every day with yearly eye exam    Follow up HTN and low back pain with PCP    Labs next month ([facility])    Follow up in a year (in person)      Alberto Galvez MD    Orders Placed This Encounter   Procedures     ALT     Albumin level     AST     Creatinine     Complement C4     Complement C3     CRP inflammation     DNA double stranded antibodies     Erythrocyte sedimentation rate auto     UA with Microscopic reflex to Culture     Protein  random urine     Creatinine random urine     Vitamin D Deficiency     CBC with Platelets & Differential

## 2023-06-23 NOTE — NURSING NOTE
"Chief Complaint   Patient presents with     RECHECK     Follow up with Lupus     BP (!) 164/128   Pulse 86   Ht 1.549 m (5' 1\")   Wt 63 kg (138 lb 14.4 oz)   BMI 26.24 kg/m    Estela Ji CMA on 6/23/2023 at 1:46 PM    "

## 2023-07-19 DIAGNOSIS — Z30.011 ENCOUNTER FOR INITIAL PRESCRIPTION OF CONTRACEPTIVE PILLS: ICD-10-CM

## 2023-07-19 RX ORDER — ACETAMINOPHEN AND CODEINE PHOSPHATE 120; 12 MG/5ML; MG/5ML
0.35 SOLUTION ORAL DAILY
Qty: 84 TABLET | Refills: 3 | Status: SHIPPED | OUTPATIENT
Start: 2023-07-19

## 2023-07-19 NOTE — TELEPHONE ENCOUNTER
Patients last PAP and annual was on 8-11-22. Her PAP which was normal.      Refill for 1 year per protocol

## 2023-07-21 ENCOUNTER — LAB (OUTPATIENT)
Dept: LAB | Facility: CLINIC | Age: 23
End: 2023-07-21
Payer: COMMERCIAL

## 2023-07-21 DIAGNOSIS — M32.9 SYSTEMIC LUPUS ERYTHEMATOSUS, UNSPECIFIED SLE TYPE, UNSPECIFIED ORGAN INVOLVEMENT STATUS (H): ICD-10-CM

## 2023-07-21 DIAGNOSIS — E55.9 VITAMIN D DEFICIENCY: ICD-10-CM

## 2023-07-21 DIAGNOSIS — Z79.899 LONG-TERM USE OF PLAQUENIL: ICD-10-CM

## 2023-07-21 LAB
ALBUMIN MFR UR ELPH: 6.4 MG/DL
ALBUMIN SERPL BCG-MCNC: 4.4 G/DL (ref 3.5–5.2)
ALBUMIN UR-MCNC: NEGATIVE MG/DL
ALT SERPL W P-5'-P-CCNC: 12 U/L (ref 0–50)
APPEARANCE UR: CLEAR
AST SERPL W P-5'-P-CCNC: 21 U/L (ref 0–45)
BACTERIA #/AREA URNS HPF: NORMAL /HPF
BASOPHILS # BLD AUTO: 0.1 10E3/UL (ref 0–0.2)
BASOPHILS NFR BLD AUTO: 1 %
BILIRUB UR QL STRIP: NEGATIVE
COLOR UR AUTO: YELLOW
CREAT SERPL-MCNC: 0.76 MG/DL (ref 0.51–0.95)
CREAT UR-MCNC: 32.4 MG/DL
CRP SERPL-MCNC: <3 MG/L
EOSINOPHIL # BLD AUTO: 0.4 10E3/UL (ref 0–0.7)
EOSINOPHIL NFR BLD AUTO: 4 %
ERYTHROCYTE [DISTWIDTH] IN BLOOD BY AUTOMATED COUNT: 13 % (ref 10–15)
ERYTHROCYTE [SEDIMENTATION RATE] IN BLOOD BY WESTERGREN METHOD: 2 MM/HR (ref 0–20)
GFR SERPL CREATININE-BSD FRML MDRD: >90 ML/MIN/1.73M2
GLUCOSE UR STRIP-MCNC: NEGATIVE MG/DL
HCT VFR BLD AUTO: 48.7 % (ref 35–47)
HGB BLD-MCNC: 16.4 G/DL (ref 11.7–15.7)
HGB UR QL STRIP: NEGATIVE
IMM GRANULOCYTES # BLD: 0 10E3/UL
IMM GRANULOCYTES NFR BLD: 0 %
KETONES UR STRIP-MCNC: NEGATIVE MG/DL
LEUKOCYTE ESTERASE UR QL STRIP: NEGATIVE
LYMPHOCYTES # BLD AUTO: 3.2 10E3/UL (ref 0.8–5.3)
LYMPHOCYTES NFR BLD AUTO: 36 %
MCH RBC QN AUTO: 28.6 PG (ref 26.5–33)
MCHC RBC AUTO-ENTMCNC: 33.7 G/DL (ref 31.5–36.5)
MCV RBC AUTO: 85 FL (ref 78–100)
MONOCYTES # BLD AUTO: 0.5 10E3/UL (ref 0–1.3)
MONOCYTES NFR BLD AUTO: 6 %
NEUTROPHILS # BLD AUTO: 4.9 10E3/UL (ref 1.6–8.3)
NEUTROPHILS NFR BLD AUTO: 54 %
NITRATE UR QL: NEGATIVE
PH UR STRIP: 7 [PH] (ref 5–7)
PLATELET # BLD AUTO: 218 10E3/UL (ref 150–450)
PROT/CREAT 24H UR: 0.2 MG/MG CR (ref 0–0.2)
RBC # BLD AUTO: 5.74 10E6/UL (ref 3.8–5.2)
RBC #/AREA URNS AUTO: NORMAL /HPF
SP GR UR STRIP: 1.01 (ref 1–1.03)
UROBILINOGEN UR STRIP-ACNC: 0.2 E.U./DL
WBC # BLD AUTO: 9 10E3/UL (ref 4–11)
WBC #/AREA URNS AUTO: NORMAL /HPF

## 2023-07-21 PROCEDURE — 85025 COMPLETE CBC W/AUTO DIFF WBC: CPT

## 2023-07-21 PROCEDURE — 85652 RBC SED RATE AUTOMATED: CPT

## 2023-07-21 PROCEDURE — 82040 ASSAY OF SERUM ALBUMIN: CPT

## 2023-07-21 PROCEDURE — 86160 COMPLEMENT ANTIGEN: CPT

## 2023-07-21 PROCEDURE — 81001 URINALYSIS AUTO W/SCOPE: CPT

## 2023-07-21 PROCEDURE — 82306 VITAMIN D 25 HYDROXY: CPT

## 2023-07-21 PROCEDURE — 86160 COMPLEMENT ANTIGEN: CPT | Mod: 59

## 2023-07-21 PROCEDURE — 36415 COLL VENOUS BLD VENIPUNCTURE: CPT

## 2023-07-21 PROCEDURE — 84450 TRANSFERASE (AST) (SGOT): CPT

## 2023-07-21 PROCEDURE — 82565 ASSAY OF CREATININE: CPT

## 2023-07-21 PROCEDURE — 84460 ALANINE AMINO (ALT) (SGPT): CPT

## 2023-07-21 PROCEDURE — 84156 ASSAY OF PROTEIN URINE: CPT

## 2023-07-21 PROCEDURE — 86140 C-REACTIVE PROTEIN: CPT

## 2023-07-21 PROCEDURE — 86225 DNA ANTIBODY NATIVE: CPT

## 2023-07-24 LAB
C3 SERPL-MCNC: 105 MG/DL (ref 81–157)
C4 SERPL-MCNC: 16 MG/DL (ref 13–39)
DEPRECATED CALCIDIOL+CALCIFEROL SERPL-MC: 35 UG/L (ref 20–75)
DSDNA AB SER-ACNC: 40 IU/ML

## 2023-07-25 ENCOUNTER — TELEPHONE (OUTPATIENT)
Dept: RHEUMATOLOGY | Facility: CLINIC | Age: 23
End: 2023-07-25
Payer: COMMERCIAL

## 2023-07-25 NOTE — TELEPHONE ENCOUNTER
ANITA and chris sent for the patient to call back and schedule the following:    Appointment type: Return  Provider: Dr. Galvez  Return date: June 2024  Specialty phone number: [phone]  Additional appointment(s) needed:   Additonal Notes:

## 2023-07-26 ENCOUNTER — OFFICE VISIT (OUTPATIENT)
Dept: FAMILY MEDICINE | Facility: CLINIC | Age: 23
End: 2023-07-26
Payer: COMMERCIAL

## 2023-07-26 VITALS
OXYGEN SATURATION: 96 % | SYSTOLIC BLOOD PRESSURE: 155 MMHG | DIASTOLIC BLOOD PRESSURE: 109 MMHG | HEIGHT: 61 IN | WEIGHT: 138 LBS | HEART RATE: 71 BPM | TEMPERATURE: 98 F | RESPIRATION RATE: 16 BRPM | BODY MASS INDEX: 26.06 KG/M2

## 2023-07-26 DIAGNOSIS — I10 HYPERTENSION, UNSPECIFIED TYPE: ICD-10-CM

## 2023-07-26 DIAGNOSIS — H66.002 NON-RECURRENT ACUTE SUPPURATIVE OTITIS MEDIA OF LEFT EAR WITHOUT SPONTANEOUS RUPTURE OF TYMPANIC MEMBRANE: Primary | ICD-10-CM

## 2023-07-26 DIAGNOSIS — D84.9 IMMUNOCOMPROMISED (H): ICD-10-CM

## 2023-07-26 PROCEDURE — 99214 OFFICE O/P EST MOD 30 MIN: CPT | Performed by: FAMILY MEDICINE

## 2023-07-26 RX ORDER — NEOMYCIN SULFATE, POLYMYXIN B SULFATE, HYDROCORTISONE 3.5; 10000; 1 MG/ML; [USP'U]/ML; MG/ML
3 SOLUTION/ DROPS AURICULAR (OTIC) 4 TIMES DAILY
Qty: 3 ML | Refills: 0 | Status: SHIPPED | OUTPATIENT
Start: 2023-07-26 | End: 2023-07-31

## 2023-07-26 NOTE — PROGRESS NOTES
"  Assessment & Plan     Non-recurrent acute suppurative otitis media of left ear without spontaneous rupture of tympanic membrane  Unable to see TM, but with the cream colored substance in with the wax will treat her for possible AOM.  Reports the substance mom put in her ear was green, not white.  Will trial topical ear drops first, though this may not work well given the wax presence.  Recommended she do this for the next few days, and if worsening at all we will do an oral antibiotic for her.  - neomycin-polymyxin-hydrocortisone (CORTISPORIN) 3.5-73735-0 otic solution; Place 3 drops into both ears 4 times daily for 5 days    Immunocompromised (H)  On Hydroxychloroquine, managed by Rheumatology    Hypertension, unspecified type  Reports that she is supposed to follow up with cardiology, advised she do this soon given high pressures warranting workup and treatment.      15 minutes spent by me on the date of the encounter doing chart review, history and exam, documentation and further activities per the note       BMI:   Estimated body mass index is 26.07 kg/m  as calculated from the following:    Height as of this encounter: 1.549 m (5' 1\").    Weight as of this encounter: 62.6 kg (138 lb).       See Patient Instructions    Olga Knowles MD  [facility]    Mike Morgan is a 23 year old, presenting for the following health issues:  Ear Problem (Ear pain mainly left side but lots of pressure in right side as well,  and swelling under left eye )        7/26/2023     9:38 AM   Additional Questions   Roomed by Janae Nichols     History of Present Illness       Reason for visit:  Ear pain/ eye swelling  Symptom onset:  1-3 days ago  Symptom intensity:  Moderate  Symptom progression:  Staying the same  Had these symptoms before:  No    She eats 2-3 servings of fruits and vegetables daily.She consumes 2 sweetened beverage(s) daily.She exercises with enough effort to increase her " "heart rate 10 to 19 minutes per day.  She exercises with enough effort to increase her heart rate 3 or less days per week.   She is taking medications regularly.       Concern - ear pain/pressure  Onset: 3 days ago  Description: pain in left ear, pressure on right   Intensity: moderate  Progression of Symptoms:  worsening  Accompanying Signs & Symptoms: pain, pressure, eye swelling  Previous history of similar problem: no  Precipitating factors:        Worsened by: talking on the phone  Alleviating factors:        Improved by: moms home remedies  Therapies tried and outcome: moms herbal remedies's (rue- a plant for earaches)    Both ears are painful, pressure for three days, mothers herbal treatment have helped a little, no drainage from her ear, feels like she has water in the ear.  No allergies normally, does not feel sick but feels like this could lead to an illness, like one is coming.    Eye(s) Problem  Onset/Duration: today  Description:   Location: YES  Pain: No  Redness: YES  Accompanying Signs & Symptoms:  Discharge/mattering: No  Swelling: YES  Visual changes: No  Fever: No  Nasal Congestion: YES  Bothered by bright lights: No  History:  Trauma: No  Foreign body exposure: No  Wearing contacts: No  Precipitating or alleviating factors: None  Therapies tried and outcome: None    Underneath left eye she has swelling, eye was teary this morning    Has hypertension, mild, per 24 hour heart monitor.  Has not followed up with cardiology.    Review of Systems   Constitutional, HEENT, cardiovascular, pulmonary, gi and gu systems are negative, except as otherwise noted.      Objective    BP (!) 155/109 (BP Location: Right arm, Patient Position: Sitting, Cuff Size: Adult Regular)   Pulse 71   Temp 98  F (36.7  C) (Oral)   Resp 16   Ht 1.549 m (5' 1\")   Wt 62.6 kg (138 lb)   LMP 07/24/2023 (Approximate)   SpO2 96%   BMI 26.07 kg/m    Body mass index is 26.07 kg/m .  Physical Exam   GENERAL: healthy, alert and " no distress  EYES: Eyes grossly normal to inspection, PERRL and conjunctivae and sclerae normal. Mild swelling below eye.  HENT: normal cephalic/atraumatic, right ear: normal: no effusions, no erythema, normal landmarks, left ear: occluded with wax and creamy white substance, questionable purulence, nose and mouth without ulcers or lesions, oropharynx clear, and oral mucous membranes moist  NECK: no adenopathy, no asymmetry, masses, or scars and thyroid normal to palpation

## 2023-07-26 NOTE — PROGRESS NOTES
{PROVIDER CHARTING PREFERENCE:389659}    Subjective   Alexia is a 23 year old, presenting for the following health issues:  Ear Problem      7/26/2023     9:38 AM   Additional Questions   Roomed by Janae Nichols     HPI     {SUPERLIST (Optional):164283}  {additonal problems for provider to add (Optional):667681}      Review of Systems   {ROS COMP (Optional):356113}      Objective    There were no vitals taken for this visit.  There is no height or weight on file to calculate BMI.  Physical Exam   {Exam List (Optional):989457}    {Diagnostic Test Results (Optional):645325}    {AMBULATORY ATTESTATION (Optional):184421}

## 2023-07-26 NOTE — PROGRESS NOTES
{PROVIDER CHARTING PREFERENCE:660685}    Subjective   Alexia is a 23 year old, presenting for the following health issues:  Ear Problem        7/26/2023     9:38 AM   Additional Questions   Roomed by Janae Nichols     HPI     Eye(s) Problem  Onset/Duration: ***  Description:   Location: {.:175174}  Pain: {.:440781}  Redness: {.:662968}  Accompanying Signs & Symptoms:  Discharge/mattering: {.:124404}  Swelling: {.:238802}  Visual changes: {.:599048}  Fever: {.:334745}  Nasal Congestion: {.:009985}  Bothered by bright lights: {.:663903}  History:  Trauma: {.:031552}  Foreign body exposure: {.:019023}  Wearing contacts: {.:155596}  Precipitating or alleviating factors: {.:242682}  Therapies tried and outcome: {.:974920}  {additonal problems for provider to add (Optional):874431}      Review of Systems   {ROS COMP (Optional):152345}      Objective    There were no vitals taken for this visit.  There is no height or weight on file to calculate BMI.  Physical Exam   {Exam List (Optional):284195}    {Diagnostic Test Results (Optional):843317}    {AMBULATORY ATTESTATION (Optional):201920}

## 2023-07-28 ENCOUNTER — TELEPHONE (OUTPATIENT)
Dept: RHEUMATOLOGY | Facility: CLINIC | Age: 23
End: 2023-07-28
Payer: COMMERCIAL

## 2023-07-28 NOTE — TELEPHONE ENCOUNTER
LVM for the patient to call back and schedule the following:    Appointment type: Return In-Person  Provider: Dr. Galvez  Return date: June 2024  Specialty phone number: [phone]  Additional appointment(s) needed:   Additonal Notes: Tit was read on 7/25/2023.

## 2023-07-31 ENCOUNTER — VIRTUAL VISIT (OUTPATIENT)
Dept: FAMILY MEDICINE | Facility: CLINIC | Age: 23
End: 2023-07-31
Payer: COMMERCIAL

## 2023-07-31 DIAGNOSIS — M32.9 SYSTEMIC LUPUS ERYTHEMATOSUS, UNSPECIFIED SLE TYPE, UNSPECIFIED ORGAN INVOLVEMENT STATUS (H): ICD-10-CM

## 2023-07-31 DIAGNOSIS — I10 HYPERTENSION, UNSPECIFIED TYPE: Primary | ICD-10-CM

## 2023-07-31 DIAGNOSIS — M32.14 LUPUS NEPHRITIS, ISN/RPS CLASS IV (H): ICD-10-CM

## 2023-07-31 PROCEDURE — 99214 OFFICE O/P EST MOD 30 MIN: CPT | Mod: VID | Performed by: INTERNAL MEDICINE

## 2023-07-31 RX ORDER — LOSARTAN POTASSIUM 50 MG/1
50 TABLET ORAL DAILY
Qty: 90 TABLET | Refills: 0 | Status: SHIPPED | OUTPATIENT
Start: 2023-07-31 | End: 2023-10-30

## 2023-07-31 NOTE — PROGRESS NOTES
"Cathy is a 23 year old who is being evaluated via a billable video visit.      How would you like to obtain your AVS? MyChart  If the video visit is dropped, the invitation should be resent by: Text to cell phone: [phone] (DOXIMITY)  Will anyone else be joining your video visit? No          Assessment & Plan   Problem List Items Addressed This Visit          Circulatory    Hypertension - Primary    Relevant Medications    losartan (COZAAR) 50 MG tablet    Other Relevant Orders    Echocardiogram Complete    Basic metabolic panel  (Ca, Cl, CO2, Creat, Gluc, K, Na, BUN)    Adult Nephrology  Referral    Aldosterone    Renin activity    Aldosterone Renin Ratio    Metanephrines Plasma Free    US Renal Complete w Arterial Duplex       Immune    Systemic lupus erythematosus (H)    Lupus nephritis, ISN/RPS class IV (H)      Advised importance of establishing with 1 primary care doctor for continued you of care.  Start on losartan check first some hormonal work-up aldosterone renin as well as metanephrine.  Repeat potassium in 4 weeks.  Continue with healthy diet low-salt diet.  Electrolytes are normal she is on hydroxychloroquine and norethindrone for contraception.  Continue follow-up with rheumatology.  Established with renal/hypertension clinic.  Follow-up in 4 weeks and as needed.  Renal Doppler ultrasound, done in 2017 was normal.       BMI:   Estimated body mass index is 26.07 kg/m  as calculated from the following:    Height as of 7/26/23: 1.549 m (5' 1\").    Weight as of 7/26/23: 62.6 kg (138 lb).   Weight management plan: Discussed healthy diet and exercise guidelines    Work on weight loss  Regular exercise    John Garza MD  [facility]    Mike Morgan is a 23 year old, presenting for the following health issues:  Follow Up (Blood pressure)      HPI     Patient has history of lupus, presenting for follow-up she reports very elevated blood pressure readings on the 23rd " was 155/109 on the 26 was 164/2128.  This morning was 142/115.  History of lupus nephritis in the past her kidney function is normal and there are no protein in the urine no hematuria.  Occasional palpitation drinks coffee twice a day occasional headaches.  She is not on blood thinners we will continue on chronic prednisone I will order hormone replacement.  She sees rheumatology.  Does not know who is her primary doctor is.    Review of Systems   Constitutional, HEENT, cardiovascular, pulmonary, gi and gu systems are negative, except as otherwise noted.      Objective    Vitals - Patient Reported  Systolic (Patient Reported): (!) 165 (06/26/2023 last bp)  Diastolic (Patient Reported): (!) 109      Vitals:  No vitals were obtained today due to virtual visit.    Physical Exam   GENERAL: Healthy, alert and no distress  EYES: Eyes grossly normal to inspection.  No discharge or erythema, or obvious scleral/conjunctival abnormalities.  RESP: No audible wheeze, cough, or visible cyanosis.  No visible retractions or increased work of breathing.    SKIN: Visible skin clear. No significant rash, abnormal pigmentation or lesions.  NEURO: Cranial nerves grossly intact.  Mentation and speech appropriate for age.  PSYCH: Mentation appears normal, affect normal/bright, judgement and insight intact, normal speech and appearance well-groomed.    Lab on 07/21/2023   Component Date Value Ref Range Status    ALT 07/21/2023 12  0 - 50 U/L Final    Reference intervals for this test were updated on 6/12/2023 to more accurately reflect our healthy population. There may be differences in the flagging of prior results with similar values performed with this method. Interpretation of those prior results can be made in the context of the updated reference intervals.      Albumin 07/21/2023 4.4  3.5 - 5.2 g/dL Final    AST 07/21/2023 21  0 - 45 U/L Final    Reference intervals for this test were updated on 6/12/2023 to more accurately reflect  our healthy population. There may be differences in the flagging of prior results with similar values performed with this method. Interpretation of those prior results can be made in the context of the updated reference intervals.    Creatinine 07/21/2023 0.76  0.51 - 0.95 mg/dL Final    GFR Estimate 07/21/2023 >90  >60 mL/min/1.73m2 Final    C4 Complement 07/21/2023 16  13 - 39 mg/dL Final    C3 Complement 07/21/2023 105  81 - 157 mg/dL Final    CRP Inflammation 07/21/2023 <3.00  <5.00 mg/L Final    DNA (ds) Antibody 07/21/2023 40.0 (H)  <10.0 IU/mL Final    Positive    Erythrocyte Sedimentation Rate 07/21/2023 2  0 - 20 mm/hr Final    Color Urine 07/21/2023 Yellow  Colorless, Straw, Light Yellow, Yellow Final    Appearance Urine 07/21/2023 Clear  Clear Final    Glucose Urine 07/21/2023 Negative  Negative mg/dL Final    Bilirubin Urine 07/21/2023 Negative  Negative Final    Ketones Urine 07/21/2023 Negative  Negative mg/dL Final    Specific Gravity Urine 07/21/2023 1.010  1.003 - 1.035 Final    Blood Urine 07/21/2023 Negative  Negative Final    pH Urine 07/21/2023 7.0  5.0 - 7.0 Final    Protein Albumin Urine 07/21/2023 Negative  Negative mg/dL Final    Urobilinogen Urine 07/21/2023 0.2  0.2, 1.0 E.U./dL Final    Nitrite Urine 07/21/2023 Negative  Negative Final    Leukocyte Esterase Urine 07/21/2023 Negative  Negative Final    Total Protein Urine mg/dL 07/21/2023 6.4    mg/dL Final    The reference ranges have not been established in urine protein. The results should be integrated into the clinical context for interpretation.    Total Protein UR MG/MG CR 07/21/2023 0.20  0.00 - 0.20 mg/mg Cr Final    Creatinine Urine mg/dL 07/21/2023 32.4  mg/dL Final    The reference ranges have not been established in urine creatinine. The results should be integrated into the clinical context for interpretation.    Vitamin D, Total (25-Hydroxy) 07/21/2023 35  20 - 75 ug/L Final    WBC Count 07/21/2023 9.0  4.0 - 11.0 10e3/uL  Final    RBC Count 07/21/2023 5.74 (H)  3.80 - 5.20 10e6/uL Final    Hemoglobin 07/21/2023 16.4 (H)  11.7 - 15.7 g/dL Final    Hematocrit 07/21/2023 48.7 (H)  35.0 - 47.0 % Final    MCV 07/21/2023 85  78 - 100 fL Final    MCH 07/21/2023 28.6  26.5 - 33.0 pg Final    MCHC 07/21/2023 33.7  31.5 - 36.5 g/dL Final    RDW 07/21/2023 13.0  10.0 - 15.0 % Final    Platelet Count 07/21/2023 218  150 - 450 10e3/uL Final    % Neutrophils 07/21/2023 54  % Final    % Lymphocytes 07/21/2023 36  % Final    % Monocytes 07/21/2023 6  % Final    % Eosinophils 07/21/2023 4  % Final    % Basophils 07/21/2023 1  % Final    % Immature Granulocytes 07/21/2023 0  % Final    Absolute Neutrophils 07/21/2023 4.9  1.6 - 8.3 10e3/uL Final    Absolute Lymphocytes 07/21/2023 3.2  0.8 - 5.3 10e3/uL Final    Absolute Monocytes 07/21/2023 0.5  0.0 - 1.3 10e3/uL Final    Absolute Eosinophils 07/21/2023 0.4  0.0 - 0.7 10e3/uL Final    Absolute Basophils 07/21/2023 0.1  0.0 - 0.2 10e3/uL Final    Absolute Immature Granulocytes 07/21/2023 0.0  <=0.4 10e3/uL Final    Bacteria Urine 07/21/2023 None Seen  None Seen /HPF Final    RBC Urine 07/21/2023 0-2  0-2 /HPF /HPF Final    WBC Urine 07/21/2023 0-5  0-5 /HPF /HPF Final             Video-Visit Details    Type of service:  Video Visit     Originating Location (pt. Location): Home    Distant Location (provider location):  On-site  Platform used for Video Visit: [facility]

## 2023-08-01 ENCOUNTER — LAB (OUTPATIENT)
Dept: LAB | Facility: CLINIC | Age: 23
End: 2023-08-01
Payer: COMMERCIAL

## 2023-08-01 DIAGNOSIS — Z11.59 NEED FOR HEPATITIS C SCREENING TEST: Primary | ICD-10-CM

## 2023-08-01 DIAGNOSIS — I10 HYPERTENSION, UNSPECIFIED TYPE: ICD-10-CM

## 2023-08-01 DIAGNOSIS — Z11.3 SCREENING FOR STDS (SEXUALLY TRANSMITTED DISEASES): ICD-10-CM

## 2023-08-01 PROCEDURE — 87591 N.GONORRHOEAE DNA AMP PROB: CPT

## 2023-08-01 PROCEDURE — 99000 SPECIMEN HANDLING OFFICE-LAB: CPT

## 2023-08-01 PROCEDURE — 84244 ASSAY OF RENIN: CPT | Mod: 90

## 2023-08-01 PROCEDURE — 82088 ASSAY OF ALDOSTERONE: CPT

## 2023-08-01 PROCEDURE — 36415 COLL VENOUS BLD VENIPUNCTURE: CPT

## 2023-08-01 PROCEDURE — 83835 ASSAY OF METANEPHRINES: CPT | Mod: 90

## 2023-08-01 PROCEDURE — 87491 CHLMYD TRACH DNA AMP PROBE: CPT

## 2023-08-01 PROCEDURE — 86803 HEPATITIS C AB TEST: CPT

## 2023-08-02 LAB
C TRACH DNA SPEC QL NAA+PROBE: NEGATIVE
HCV AB SERPL QL IA: NONREACTIVE
N GONORRHOEA DNA SPEC QL NAA+PROBE: NEGATIVE

## 2023-08-03 DIAGNOSIS — M32.14 LUPUS NEPHRITIS, ISN/RPS CLASS IV (H): Primary | ICD-10-CM

## 2023-08-04 LAB
ALDOST SERPL-MCNC: 14.5 NG/DL (ref 0–31)
ALDOST/RENIN PLAS-RTO: 6 {RATIO} (ref 0–25)
ANNOTATION COMMENT IMP: NORMAL
METANEPHS SERPL-SCNC: 0.18 NMOL/L
NORMETANEPHRINE SERPL-SCNC: 0.19 NMOL/L
RENIN PLAS-CCNC: 2.4 NG/ML/HR

## 2023-08-08 ENCOUNTER — PRE VISIT (OUTPATIENT)
Dept: NEPHROLOGY | Facility: CLINIC | Age: 23
End: 2023-08-08

## 2023-08-08 ENCOUNTER — LAB (OUTPATIENT)
Dept: LAB | Facility: CLINIC | Age: 23
End: 2023-08-08
Attending: INTERNAL MEDICINE
Payer: COMMERCIAL

## 2023-08-08 ENCOUNTER — OFFICE VISIT (OUTPATIENT)
Dept: NEPHROLOGY | Facility: CLINIC | Age: 23
End: 2023-08-08
Attending: INTERNAL MEDICINE
Payer: COMMERCIAL

## 2023-08-08 VITALS
BODY MASS INDEX: 26.64 KG/M2 | DIASTOLIC BLOOD PRESSURE: 84 MMHG | HEART RATE: 114 BPM | WEIGHT: 141 LBS | SYSTOLIC BLOOD PRESSURE: 133 MMHG | OXYGEN SATURATION: 98 %

## 2023-08-08 DIAGNOSIS — D84.9 IMMUNOSUPPRESSION (H): ICD-10-CM

## 2023-08-08 DIAGNOSIS — M32.14 OTHER SYSTEMIC LUPUS ERYTHEMATOSUS WITH GLOMERULAR DISEASE (H): ICD-10-CM

## 2023-08-08 DIAGNOSIS — D58.2 ABNORMAL HEMOGLOBIN (H): ICD-10-CM

## 2023-08-08 DIAGNOSIS — I10 HYPERTENSION, UNSPECIFIED TYPE: ICD-10-CM

## 2023-08-08 DIAGNOSIS — M32.14 LUPUS NEPHRITIS (H): ICD-10-CM

## 2023-08-08 DIAGNOSIS — D75.1 ERYTHROCYTOSIS: ICD-10-CM

## 2023-08-08 DIAGNOSIS — M32.14 LUPUS NEPHRITIS (H): Primary | ICD-10-CM

## 2023-08-08 DIAGNOSIS — M32.14 LUPUS NEPHRITIS, ISN/RPS CLASS IV (H): ICD-10-CM

## 2023-08-08 LAB
ALBUMIN SERPL BCG-MCNC: 4.3 G/DL (ref 3.5–5.2)
ALBUMIN UR-MCNC: NEGATIVE MG/DL
ANION GAP SERPL CALCULATED.3IONS-SCNC: 10 MMOL/L (ref 7–15)
APPEARANCE UR: CLEAR
BACTERIA #/AREA URNS HPF: ABNORMAL /HPF
BILIRUB UR QL STRIP: NEGATIVE
BUN SERPL-MCNC: 11.3 MG/DL (ref 6–20)
CALCIUM SERPL-MCNC: 9.5 MG/DL (ref 8.6–10)
CHLORIDE SERPL-SCNC: 104 MMOL/L (ref 98–107)
COLOR UR AUTO: ABNORMAL
CREAT SERPL-MCNC: 0.73 MG/DL (ref 0.51–0.95)
CREAT UR-MCNC: 27.7 MG/DL
DEPRECATED HCO3 PLAS-SCNC: 23 MMOL/L (ref 22–29)
GFR SERPL CREATININE-BSD FRML MDRD: >90 ML/MIN/1.73M2
GLUCOSE SERPL-MCNC: 112 MG/DL (ref 70–99)
GLUCOSE UR STRIP-MCNC: NEGATIVE MG/DL
HGB BLD-MCNC: 16 G/DL (ref 11.7–15.7)
HGB UR QL STRIP: NEGATIVE
KETONES UR STRIP-MCNC: NEGATIVE MG/DL
LEUKOCYTE ESTERASE UR QL STRIP: NEGATIVE
MICROALBUMIN UR-MCNC: <12 MG/L
MICROALBUMIN/CREAT UR: NORMAL MG/G{CREAT}
NITRATE UR QL: NEGATIVE
PH UR STRIP: 5.5 [PH] (ref 5–7)
PHOSPHATE SERPL-MCNC: 3.6 MG/DL (ref 2.5–4.5)
POTASSIUM SERPL-SCNC: 4 MMOL/L (ref 3.4–5.3)
RBC URINE: 2 /HPF
SODIUM SERPL-SCNC: 137 MMOL/L (ref 136–145)
SP GR UR STRIP: 1.01 (ref 1–1.03)
SQUAMOUS EPITHELIAL: 1 /HPF
UROBILINOGEN UR STRIP-MCNC: NORMAL MG/DL
WBC URINE: <1 /HPF

## 2023-08-08 PROCEDURE — 82668 ASSAY OF ERYTHROPOIETIN: CPT | Mod: 90 | Performed by: PATHOLOGY

## 2023-08-08 PROCEDURE — 82570 ASSAY OF URINE CREATININE: CPT | Performed by: INTERNAL MEDICINE

## 2023-08-08 PROCEDURE — 99213 OFFICE O/P EST LOW 20 MIN: CPT | Performed by: INTERNAL MEDICINE

## 2023-08-08 PROCEDURE — 36415 COLL VENOUS BLD VENIPUNCTURE: CPT | Performed by: PATHOLOGY

## 2023-08-08 PROCEDURE — 85018 HEMOGLOBIN: CPT | Performed by: PATHOLOGY

## 2023-08-08 PROCEDURE — 99000 SPECIMEN HANDLING OFFICE-LAB: CPT | Performed by: PATHOLOGY

## 2023-08-08 PROCEDURE — 99204 OFFICE O/P NEW MOD 45 MIN: CPT | Performed by: INTERNAL MEDICINE

## 2023-08-08 PROCEDURE — 81001 URINALYSIS AUTO W/SCOPE: CPT | Performed by: PATHOLOGY

## 2023-08-08 PROCEDURE — 80069 RENAL FUNCTION PANEL: CPT | Performed by: PATHOLOGY

## 2023-08-08 ASSESSMENT — PAIN SCALES - GENERAL: PAINLEVEL: NO PAIN (0)

## 2023-08-08 NOTE — PROGRESS NOTES
Nephrology Clinic Note  August 8, 2023    I was asked to see this patient by Dr. Garza    CC: hypertension    HPI: Cathy Freedman is a 23 year old female with PMH significant for lupus, including lupus nephritis on chronic plaquenil, recent diagnosis of hypertension who presents for evaluation and management of HTN.     Pt presented for hypertension eval. Endorsed that she was on atenolol in 2017 when she had active lupus nephritis, but being off of it since then. Noted her BP were trending up since June 2022. At that time, felt 2/2 anxiety related to hospital and or her line of work. But started to have recurrent headaches in the recent past and continued to have high BP readings. Underwent 24 hour BP monitor showed average BP been in 130's systolic. With her symptoms and continued high BP, started on losartan at 50 mg. Initial home BP were in 140's and in clinic today was at 133 systolic. Continued to be tachycardic today. Endorsed aches and pains intermittently from her lupus and takes tylenol as needed. Very rarely takes Advil for her headaches not relieved by tylenol. Menstrual cycles were regular. 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 No change in appetite or weight recently.    Pt denied intermittent symptoms of tachycardia, diaphoresis with near syncope symptoms (usually happens pheochromocytoma, but pt denied them).    Endorsed b/l ear infection couple weeks ago, which was treated. No residual issues per pt.    - History of urinary symptoms: no  - History of Hematuria: no  - Swelling: no  - Hx of UTIs: no  - Hx of stones: no  - Rashes/Joint pain: no, have had some aches and pains 2/2 lupus but no active joint aches or swelling  - Family hx of kidney disease: no  - NSAID use: takes Advil once every 2-3 months for headaches, usually uses tylenol of needed.       Allergies   Allergen Reactions    Penicillin G      Avoiding with diagnosis of lupus        hydroxychloroquine (PLAQUENIL) 200 MG tablet, Take 1 tablet (200 mg) by mouth daily Annual Plaquenil toxicity eye screening required.  losartan (COZAAR) 50 MG tablet, Take 1 tablet (50 mg) by mouth daily  norethindrone (MICRONOR) 0.35 MG tablet, Take 1 tablet (0.35 mg) by mouth daily    No current facility-administered medications on file prior to visit.      Past Medical History:   Diagnosis Date    Anemia of chronic disease     Anxious appearance 1/3/2018    Long term systemic steroid user 10/17/2017    Lupus nephritis, ISN/RPS class IV (H)     Non-adherence to medical treatment     Psychosis (H) 1/2/2018    Renal hypertension     Skin breakdown 11/13/2017    SLE (systemic lupus erythematosus related syndrome) (H)        Past Surgical History:   Procedure Laterality Date    PERCUTANEOUS BIOPSY KIDNEY Right 10/6/2017    Procedure: PERCUTANEOUS BIOPSY KIDNEY;  Kidney Biopsy Percutaneous Right ;  Surgeon: Preston Russo MD;  Location:  OR       Social History     Tobacco Use    Smoking status: Never    Smokeless tobacco: Never   Vaping Use    Vaping Use: Never used   Substance Use Topics    Alcohol use: Yes     Comment: rare    Drug use: No       Family History   Problem Relation Age of Onset    No Known Problems Mother     No Known Problems Father     No Known Problems Sister     No Known Problems Brother     No Known Problems Maternal Grandmother     No Known Problems Maternal Grandfather     No Known Problems Paternal Grandmother     No Known Problems Paternal Grandfather     Diabetes Maternal Aunt     Hypertension Paternal Uncle     Thyroid Disease Other         Cousin: hyperthyroid       ROS: A 12 system review of systems was negative other than noted here or above.     Exam:  /84 (BP Location: Right arm, Patient Position: Sitting, Cuff Size: Adult Regular)   Pulse 114   Wt 64 kg (141 lb)   LMP 07/24/2023 (Approximate)   SpO2 98%   BMI 26.64 kg/m      GENERAL APPEARANCE: alert and no  distress  EYES:  no scleral icterus  HENT: no gross abnormalities noted  NECK: supple, no adenopathy  RESP: lungs clear to auscultation   CV: regular rhythm, no rub, tachycardia noted  Extremities: no clubbing, cyanosis, or edema  SKIN: no rash  NEURO: mentation intact and speech normal  PSYCH: affect normal/bright    Results:    Lab on 08/08/2023   Component Date Value Ref Range Status    Hemoglobin 08/08/2023 16.0 (H)  11.7 - 15.7 g/dL Final    Sodium 08/08/2023 137  136 - 145 mmol/L Final    Potassium 08/08/2023 4.0  3.4 - 5.3 mmol/L Final    Chloride 08/08/2023 104  98 - 107 mmol/L Final    Carbon Dioxide (CO2) 08/08/2023 23  22 - 29 mmol/L Final    Anion Gap 08/08/2023 10  7 - 15 mmol/L Final    Glucose 08/08/2023 112 (H)  70 - 99 mg/dL Final    Urea Nitrogen 08/08/2023 11.3  6.0 - 20.0 mg/dL Final    Creatinine 08/08/2023 0.73  0.51 - 0.95 mg/dL Final    GFR Estimate 08/08/2023 >90  >60 mL/min/1.73m2 Final    Calcium 08/08/2023 9.5  8.6 - 10.0 mg/dL Final    Albumin 08/08/2023 4.3  3.5 - 5.2 g/dL Final    Phosphorus 08/08/2023 3.6  2.5 - 4.5 mg/dL Final       Assessment/Plan:     Hypertension, primary vs secondary   Erythrocytosis :  BP in clinic is 133/84. Home, it will be similar vs slightly elevated. On losartan, started a week ago.  On day one of losartan intake, she had some chest pressure and tingling in her finger tips, resolved quickly. Continue on losartan for now  Secondary work up includign david-renin ratio is normal done in July 2023  Renal duplex in 2017 was normal, will repeat to rule out new changes   Noted to have high HB at ~16.4 with high RBC levels. ? About it causing elevated BP vs erythropoietin causing both erythrocytosis and high BP.   Unclear if BP is related to erythrocytosis at all.  But will get basic work up including renal artery duplex (which can cause increased epo levels + increase in BP) and get EPO serum levels.   It is not very uncommon to develop essential hypertension with  lupus/ lupus nephritis, but her renal function, Urine tests and CRP levels were not c/w active disease.    Erythrocytosis :  Hb is high at 16.4, June 2022, improved on repeat. But this year been high again at ~16. Denied any associated chest pain or SOB. Not on any supplementation, no new meds other than losartan. Denied any OTC medications/ herbs or supplements today. Possible causes will be renal mass, other cancers/extra renal production, hypoxia and or smoking. No smoking history.   - erythropoietin level ordered   - renal US to rule out any masses  - will consider referral to hematology for further eval.    CKD stage I  Biopsy proven Lupus nephritis   Systemic Lupus on plaquenil  Renal function in 2017 was abnormal with peak protein of 11g/g of creatinine. Underwent renal biopsy which showed active disease. Initially diagnosed with systemic lupus in 2014, apparently on Cellcept and plaquenil. Had some compliant issues made her develop nephrotic syndrome in 2017 and underwent biopsy showing lupus involving kidneys. Seems like used steroids with some side effects including psychiatric issues and BP. She endorsed that she was off of any immunosuppressive meds for couple years before starting on maintenance dose of plaquenil few months ago by Dr. Galvez.    Electrolytes :  Stable    BMD :  Orion, phos WNL    Metabolic acidosis :  Bicarb is WNL    Other problems  Systemic Lupus, symptoms controlled on low dose Plaquenil. Following with rheumatology.    Lesvia Ocasio  Dept of Nephrology and Hypertension  [facility]

## 2023-08-08 NOTE — LETTER
8/8/2023       RE: Cathy Freedman  [address] [address] [facility] [id number]     Dear Colleague,    Thank you for referring your patient, Cathy Freedman, to the [facility] [place] at [facility]. Please see a copy of my visit note below.      Nephrology Clinic Note  August 8, 2023    I was asked to see this patient by Dr. Garza    CC: hypertension    HPI: Cathy Freedman is a 23 year old female with PMH significant for lupus, including lupus nephritis on chronic plaquenil, recent diagnosis of hypertension who presents for evaluation and management of HTN.     Pt presented for hypertension eval. Endorsed that she was on atenolol in 2017 when she had active lupus nephritis, but being off of it since then. Noted her BP were trending up since June 2022. At that time, felt 2/2 anxiety related to hospital and or her line of work. But started to have recurrent headaches in the recent past and continued to have high BP readings. Underwent 24 hour BP monitor showed average BP been in 130's systolic. With her symptoms and continued high BP, started on losartan at 50 mg. Initial home BP were in 140's and in clinic today was at 133 systolic. Continued to be tachycardic today. Endorsed aches and pains intermittently from her lupus and takes tylenol as needed. Very rarely takes Advil for her headaches not relieved by tylenol. Menstrual cycles were regular. 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 No change in appetite or weight recently.    Pt denied intermittent symptoms of tachycardia, diaphoresis with near syncope symptoms (usually happens pheochromocytoma, but pt denied them).    Endorsed b/l ear infection couple weeks ago, which was treated. No residual issues per pt.    - History of urinary symptoms: no  - History of Hematuria: no  - Swelling: no  - Hx of UTIs: no  - Hx of stones:  no  - Rashes/Joint pain: no, have had some aches and pains 2/2 lupus but no active joint aches or swelling  - Family hx of kidney disease: no  - NSAID use: takes Advil once every 2-3 months for headaches, usually uses tylenol of needed.       Allergies   Allergen Reactions    Penicillin G      Avoiding with diagnosis of lupus       hydroxychloroquine (PLAQUENIL) 200 MG tablet, Take 1 tablet (200 mg) by mouth daily Annual Plaquenil toxicity eye screening required.  losartan (COZAAR) 50 MG tablet, Take 1 tablet (50 mg) by mouth daily  norethindrone (MICRONOR) 0.35 MG tablet, Take 1 tablet (0.35 mg) by mouth daily    No current facility-administered medications on file prior to visit.      Past Medical History:   Diagnosis Date    Anemia of chronic disease     Anxious appearance 1/3/2018    Long term systemic steroid user 10/17/2017    Lupus nephritis, ISN/RPS class IV (H)     Non-adherence to medical treatment     Psychosis (H) 1/2/2018    Renal hypertension     Skin breakdown 11/13/2017    SLE (systemic lupus erythematosus related syndrome) (H)        Past Surgical History:   Procedure Laterality Date    PERCUTANEOUS BIOPSY KIDNEY Right 10/6/2017    Procedure: PERCUTANEOUS BIOPSY KIDNEY;  Kidney Biopsy Percutaneous Right ;  Surgeon: Preston Russo MD;  Location: [facility] OR       Social History     Tobacco Use    Smoking status: Never    Smokeless tobacco: Never   Vaping Use    Vaping Use: Never used   Substance Use Topics    Alcohol use: Yes     Comment: rare    Drug use: No       Family History   Problem Relation Age of Onset    No Known Problems Mother     No Known Problems Father     No Known Problems Sister     No Known Problems Brother     No Known Problems Maternal Grandmother     No Known Problems Maternal Grandfather     No Known Problems Paternal Grandmother     No Known Problems Paternal Grandfather     Diabetes Maternal Aunt     Hypertension Paternal Uncle     Thyroid Disease Other         Cousin:  hyperthyroid       ROS: A 12 system review of systems was negative other than noted here or above.     Exam:  /84 (BP Location: Right arm, Patient Position: Sitting, Cuff Size: Adult Regular)   Pulse 114   Wt 64 kg (141 lb)   LMP 07/24/2023 (Approximate)   SpO2 98%   BMI 26.64 kg/m      GENERAL APPEARANCE: alert and no distress  EYES:  no scleral icterus  HENT: no gross abnormalities noted  NECK: supple, no adenopathy  RESP: lungs clear to auscultation   CV: regular rhythm, no rub, tachycardia noted  Extremities: no clubbing, cyanosis, or edema  SKIN: no rash  NEURO: mentation intact and speech normal  PSYCH: affect normal/bright    Results:    Lab on 08/08/2023   Component Date Value Ref Range Status    Hemoglobin 08/08/2023 16.0 (H)  11.7 - 15.7 g/dL Final    Sodium 08/08/2023 137  136 - 145 mmol/L Final    Potassium 08/08/2023 4.0  3.4 - 5.3 mmol/L Final    Chloride 08/08/2023 104  98 - 107 mmol/L Final    Carbon Dioxide (CO2) 08/08/2023 23  22 - 29 mmol/L Final    Anion Gap 08/08/2023 10  7 - 15 mmol/L Final    Glucose 08/08/2023 112 (H)  70 - 99 mg/dL Final    Urea Nitrogen 08/08/2023 11.3  6.0 - 20.0 mg/dL Final    Creatinine 08/08/2023 0.73  0.51 - 0.95 mg/dL Final    GFR Estimate 08/08/2023 >90  >60 mL/min/1.73m2 Final    Calcium 08/08/2023 9.5  8.6 - 10.0 mg/dL Final    Albumin 08/08/2023 4.3  3.5 - 5.2 g/dL Final    Phosphorus 08/08/2023 3.6  2.5 - 4.5 mg/dL Final       Assessment/Plan:     Hypertension, primary vs secondary   Erythrocytosis :  BP in clinic is 133/84. Home, it will be similar vs slightly elevated. On losartan, started a week ago.  On day one of losartan intake, she had some chest pressure and tingling in her finger tips, resolved quickly. Continue on losartan for now  Secondary work up includign david-renin ratio is normal done in July 2023  Renal duplex in 2017 was normal, will repeat to rule out new changes   Noted to have high HB at ~16.4 with high RBC levels. ? About it  causing elevated BP vs erythropoietin causing both erythrocytosis and high BP.   Unclear if BP is related to erythrocytosis at all.  But will get basic work up including renal artery duplex (which can cause increased epo levels + increase in BP) and get EPO serum levels.   It is not very uncommon to develop essential hypertension with lupus/ lupus nephritis, but her renal function, Urine tests and CRP levels were not c/w active disease.    Erythrocytosis :  Hb is high at 16.4, June 2022, improved on repeat. But this year been high again at ~16. Denied any associated chest pain or SOB. Not on any supplementation, no new meds other than losartan. Denied any OTC medications/ herbs or supplements today. Possible causes will be renal mass, other cancers/extra renal production, hypoxia and or smoking. No smoking history.   - erythropoietin level ordered   - renal US to rule out any masses  - will consider referral to hematology for further eval.    CKD stage I  Biopsy proven Lupus nephritis   Systemic Lupus on plaquenil  Renal function in 2017 was abnormal with peak protein of 11g/g of creatinine. Underwent renal biopsy which showed active disease. Initially diagnosed with systemic lupus in 2014, apparently on Cellcept and plaquenil. Had some compliant issues made her develop nephrotic syndrome in 2017 and underwent biopsy showing lupus involving kidneys. Seems like used steroids with some side effects including psychiatric issues and BP. She endorsed that she was off of any immunosuppressive meds for couple years before starting on maintenance dose of plaquenil few months ago by Dr. Galvez.    Electrolytes :  Stable    BMD :  Orion, phos WNL    Metabolic acidosis :  Bicarb is WNL    Other problems  Systemic Lupus, symptoms controlled on low dose Plaquenil. Following with rheumatology.    Lesvia Ocasio  Dept of Nephrology and Hypertension  [facility]

## 2023-08-08 NOTE — PATIENT INSTRUCTIONS
Dietary Approaches to Stop Hypertension (The DASH Diet)   What is hypertension?   Hypertension is the term for blood pressure that is consistently higher than normal. Blood pressure is the force of blood against artery walls. Blood pressure can be unhealthy if it is above 120/80. The higher your blood pressure, the greater the health risk.     High blood pressure can be controlled if you take these steps:   Maintain a healthy weight.   Be physically active.   Follow a healthy eating plan, which includes foods lower in salt and sodium.   If you drink alcoholic beverages, do so in moderation.   As noted in this list, diet affects high blood pressure. Following the DASH diet and reducing the amount of sodium in your diet will help lower your blood pressure. It will also help prevent high blood pressure.     What is the DASH diet?   Dietary Approaches to Stop Hypertension (DASH) is a diet that is low in saturated fat, cholesterol, and total fat. It emphasizes fruits, vegetables, and low-fat dairy foods. The DASH diet also includes whole-grain products, fish, poultry, and nuts. It encourages fewer servings of red meat, sweets, and sugar-containing beverages. It is rich in magnesium, potassium, and calcium, as well as protein and fiber.     How do I get started on the DASH diet?   The DASH diet requires no special foods and has no hard-to-follow recipes. Start by seeing how DASH compares with your current eating habits.  The DASH eating plan shown is based on 2,000 calories a day. Your health care provider or a dietitian can help you determine how many calories a day you need. Most adults need somewhere between 1600 and 2800 calories a day. Serving sizes will vary between 1/2 cup and 1 1/4 cups.     Check the product's nutrition label to determine serving sizes of particular products.    Food Group   Number of Servings   Examples of Serving Size    Grains and grain products   7 to 8   1 slice of bread    1 cup  ready-to-eat cold cereal    1/2 cup cooked rice, pasta, or   cereal    Vegetables   4 to 5   1 cup raw leafy vegetable    1/2 cup cooked vegetable    6 oz. vegetable juice    Fruits   4 to 5   1 medium fruit       1/4 cup dried fruit    1/2 cup fresh, frozen, or canned fruit    6 oz fruit juice    Low-fat or fat-free dairy foods   2 to 3   8 oz milk    1 cup yogurt    1 1/2 oz cheese    Lean meats, poultry, or fish   2 or fewer   3 oz cooked lean meat, skinless poultry, or fish    Nuts, seeds, and dry beans   4 to 5 per week   1/3 cup or 1 1/2 oz nuts    1 tablespoon or 1/2 oz seeds    1/2 cup cooked dry beans     Fats and oils   2 to 3   1 teaspoon soft margarine    1 tablespoon low-fat mayonnaise    2 tablespoons light salad dressing    1 teaspoon vegetable oil   Sweets   5 per week   1 tablespoon sugar              8 oz lemonade    1 tablespoon jelly or jam     1/2 oz jelly beans     Make changes gradually. Here are some suggestions that might help:     If you now eat 1 or 2 servings of vegetables a day, add a serving at lunch and another at dinner.   If you don't eat fruit now or have only juice at breakfast, add a serving to your meals or have it as a snack.   Drink milk or water with lunch or dinner instead of soda, sugar-sweetened tea, or alcohol. Choose low-fat (1%) or fat-free (skim) dairy products to reduce how much saturated fat, total fat, cholesterol, and calories you eat. If you have trouble digesting dairy products, try taking lactase enzyme pills or drops (available at drugstores and groceries) with the dairy foods. Or buy lactose-free milk or milk with lactase enzyme added to it.   Read food labels on margarines and salad dressings to choose products lowest in fat.   If you now eat large portions of meat, cut back gradually--by a half or a third at each meal. Limit meat to 6 ounces a day (2 servings). Three to four ounces is about the size of a deck of cards.   Have 2 or more vegetarian-style  (meatless) meals each week. Increase servings of vegetables, rice, pasta, and beans in all meals. Try casseroles and pasta, and stir-jose dishes, which have less meat and more vegetables, grains, and beans.   Use fruits canned in their own juice. Fresh fruits require little or no preparation. Dried fruits are a good choice to carry with you or to have ready in the car.   Try these snacks ideas: unsalted pretzels or nuts mixed with raisins, kyle crackers, low-fat and fat-free yogurt and frozen yogurt, popcorn with no salt or butter added, and raw vegetables.   Choose whole grain foods to get more nutrients, including minerals and fiber. For example, choose whole-wheat bread or whole-grain cereals.   Use fresh, frozen, or no-salt-added canned vegetables.     Remember to also reduce the salt and sodium in your diet. Try to have no more than 2000 milligrams (mg) of sodium per day, with a goal of further reducing the sodium to 1500 mg per day. Three important ways to reduce sodium are:     Use reduced-sodium or no-salt-added food products.   Use less salt when you prepare foods and do not add salt to your food at the table.   Read fool labels. Aim for foods that are less than 5 percent of the daily value of sodium.     The DASH eating plan was not designed for weight loss. But it contains many lower calorie foods, such as fruits and vegetables. You can make it lower in calories by replacing higher calorie foods with more fruits and vegetables. Some ideas to increase fruits and vegetables and decrease calories include:     Eat a medium apple instead of four shortbread cookies. You'll save 80 calories.   Eat 1/4 cup of dried apricots instead of a 2-ounce bag of pork rinds. You'll save 230 calories.   Have a hamburger that's 3 ounces instead of 6 ounces. Add a 1/2 cup serving of carrots and a 1/2 cup serving of spinach. You'll save more than 200 calories.   Instead of 5 ounces of chicken, have a stir jose with 2 ounces of  chicken and 1 and 1/2 cups of raw vegetables. Use a small amount of vegetable oil. You'll save 50 calories.   Have a 1/2 cup serving of low-fat frozen yogurt instead of a 1 and 1/2 ounce milk chocolate bar. You'll save about 110 calories.   Use low-fat or fat-free condiments, such as fat free salad dressings.   Eat smaller portions--cut back gradually.   Use food labels to compare fat content in packaged foods. Items marked low-fat or fat-free may be lower in fat without being lower in calories than their regular versions.   Limit foods with lots of added sugar, such as pies, flavored yogurts, candy bars, ice cream, sherbet, regular soft drinks, and fruit drinks.   Drink water or club soda instead of cola or other soda drinks.     Based on National Institutes of Health Guidelines. Published by [employer].   Copyright   2004 [employer] and/or one of its subsidiaries. All Rights Reserved.

## 2023-08-08 NOTE — NURSING NOTE
Chief Complaint   Patient presents with    Consult     /84 (BP Location: Right arm, Patient Position: Sitting, Cuff Size: Adult Regular)   Pulse 114   Wt 64 kg (141 lb)   LMP 07/24/2023 (Approximate)   SpO2 98%   BMI 26.64 kg/m

## 2023-08-09 LAB — EPO SERPL-ACNC: 8 MU/ML

## 2023-08-14 ENCOUNTER — ANCILLARY PROCEDURE (OUTPATIENT)
Dept: ULTRASOUND IMAGING | Facility: CLINIC | Age: 23
End: 2023-08-14
Attending: INTERNAL MEDICINE
Payer: COMMERCIAL

## 2023-08-14 DIAGNOSIS — I10 HYPERTENSION, UNSPECIFIED TYPE: ICD-10-CM

## 2023-08-14 DIAGNOSIS — D58.2 ABNORMAL HEMOGLOBIN (H): ICD-10-CM

## 2023-08-14 DIAGNOSIS — M32.14 LUPUS NEPHRITIS (H): ICD-10-CM

## 2023-08-14 PROCEDURE — 93975 VASCULAR STUDY: CPT | Performed by: RADIOLOGY

## 2023-08-14 PROCEDURE — 76770 US EXAM ABDO BACK WALL COMP: CPT | Mod: XU | Performed by: RADIOLOGY

## 2023-10-01 ENCOUNTER — HEALTH MAINTENANCE LETTER (OUTPATIENT)
Age: 23
End: 2023-10-01

## 2023-10-28 DIAGNOSIS — I10 HYPERTENSION, UNSPECIFIED TYPE: ICD-10-CM

## 2023-10-30 RX ORDER — LOSARTAN POTASSIUM 50 MG/1
50 TABLET ORAL DAILY
Qty: 90 TABLET | Refills: 0 | Status: SHIPPED | OUTPATIENT
Start: 2023-10-30 | End: 2024-06-07

## 2023-11-02 DIAGNOSIS — M32.14 LUPUS NEPHRITIS (H): Primary | ICD-10-CM

## 2024-01-01 NOTE — TELEPHONE ENCOUNTER
Per Dr. Block I sent an add on request for 1/16/18 at 2 PM and confirmed with Chapis, Cathy's cell is temporarily disconnect.  
[id number]

## 2024-04-03 NOTE — NURSING NOTE
Patient here for medication review as over the phone this morning, patient was confused and unable to do over the phone, and no other family member was present to help. In clinic, patient was subdued, quiet and looked concerned. Shoulders slumped, and she is visibly shaking in her hands. Patient said she has had shakiness for some time. Sister Shira brought patient to clinic today and was present for this visit.     Patient brought all her medication bottles in a bag from home. Writer verified that all current medications in med list were in patient's possession. Writer reviewed all medications one by one and ensured label on each bottle was up to date with patient's current dosages. Writer placed a check chaparro on each bottle that was reviewed. Current dose written on Prednisone bottle. We set aside each bottle that was old or discontinued in separate bag. Discussed with patient how to dispose of old medications. Patient's pill box was set up and appeared to be used correctly; however patient said that use of the pill box was adding to her confusion, and she would prefer to use a medication list like her discharge med list and take medication out of the bottle one by one. Patient's mother who speaks Malaysian helped her set it up as well so unsure due to patient's confusion if medications were arranged correctly in the box. Writer gave patient an updated medication list. She also did not want to use a calendar to keep track of or check off am meds taken/pm meds taken.    During visit, patient kept vocalizing confusion, anxiety and being overwhelmed. Discussed multiple options for assuring correct medication administration, but she was not reassured. Patient kept vocalizing that she was anxious, scared, and could not focus. She denied feelings of harming herself or that she was not safe. She kept saying that she did not feel like herself. Discussed that this could be due to side effect of her psych medications and so  hopefully will go away with time of being off of them. Patient's sister said that Cathy has been very anxious and cannot help her siblings with their homework which she usually can do, but she has been unable to do which is not like her. Sister vocalized that she is unsure if their parents (who patient lives with) can follow up on her concerns to the extent Cathy needs. Sister is currently not allowed in the home since Cathy's last hospitalization due to a falling out with patient's mom (sister Shira's step mom) so she cannot help ensure Cathy gets her medications. She vocalized a desire to help Cathy get whatever she needs such as bring her to appointments, etc and she was glad patient called her to bring her to clinic today. Cathy's mother only speaks Arabic, and patient's father is gone very much for his work so this provides limited support with Cathy's care. Patient vocalized concern about returning home as she is very anxious and concerned about taking her medications correctly. During the visit, patient would say that she would do fine going home; however, when going to her medication list and starting to review how to take the medications, she would stop and say she was confused and overwhelmed. She vocalized to writer that she would feel safer in the hospital; however, she was concerned about the hospital bill. She did not want to talk with the Nephrology social worker but was ok with writer calling Dr. Block.     Due to patient's confusion and risk of inaccurate medication administration at home being a safety risk, Dr. Block requested that patient be sent to the hospital ED. Writer discussed this with patient and sister. Sister encouraged patient to think about her health first and not the bill alone. Writer let them discuss alone, and they decided to bring patient to the ED. Writer walked them to the ED and gave report to ER Resident Pierre Atkinson and gave brief update to Dr. Bourgeois. Report  also sent to Dr. Thapa, Nephrology on-call physician.    yes

## 2024-06-04 ENCOUNTER — MYC MEDICAL ADVICE (OUTPATIENT)
Dept: RHEUMATOLOGY | Facility: CLINIC | Age: 24
End: 2024-06-04
Payer: COMMERCIAL

## 2024-06-07 ENCOUNTER — VIRTUAL VISIT (OUTPATIENT)
Dept: RHEUMATOLOGY | Facility: CLINIC | Age: 24
End: 2024-06-07
Attending: INTERNAL MEDICINE
Payer: COMMERCIAL

## 2024-06-07 VITALS — HEIGHT: 61 IN | WEIGHT: 138 LBS | BODY MASS INDEX: 26.06 KG/M2

## 2024-06-07 DIAGNOSIS — Z51.81 MEDICATION MONITORING ENCOUNTER: ICD-10-CM

## 2024-06-07 DIAGNOSIS — Z79.899 LONG-TERM USE OF PLAQUENIL: ICD-10-CM

## 2024-06-07 DIAGNOSIS — M32.9 SYSTEMIC LUPUS ERYTHEMATOSUS, UNSPECIFIED SLE TYPE, UNSPECIFIED ORGAN INVOLVEMENT STATUS (H): Primary | ICD-10-CM

## 2024-06-07 DIAGNOSIS — I10 HYPERTENSION, UNSPECIFIED TYPE: ICD-10-CM

## 2024-06-07 DIAGNOSIS — E55.9 VITAMIN D DEFICIENCY: ICD-10-CM

## 2024-06-07 PROCEDURE — 99214 OFFICE O/P EST MOD 30 MIN: CPT | Mod: 95 | Performed by: INTERNAL MEDICINE

## 2024-06-07 RX ORDER — LOSARTAN POTASSIUM 50 MG/1
50 TABLET ORAL DAILY
Qty: 90 TABLET | Refills: 1 | Status: SHIPPED | OUTPATIENT
Start: 2024-06-07

## 2024-06-07 RX ORDER — HYDROXYCHLOROQUINE SULFATE 200 MG/1
200 TABLET, FILM COATED ORAL DAILY
Qty: 90 TABLET | Refills: 1 | Status: SHIPPED | OUTPATIENT
Start: 2024-06-07

## 2024-06-07 ASSESSMENT — PAIN SCALES - GENERAL: PAINLEVEL: NO PAIN (0)

## 2024-06-07 NOTE — NURSING NOTE
Is the patient currently in the state of [place]? YES    Visit mode:VIDEO    If the visit is dropped, the patient can be reconnected by: VIDEO VISIT: Text to cell phone:   Telephone Information:   Mobile [phone]    and VIDEO VISIT: Send to e-mail at: [email or web address]    Will anyone else be joining the visit? NO  (If patient encounters technical issues they should call [phone])    How would you like to obtain your AVS? MyChart    Are changes needed to the allergy or medication list? No    Are refills needed on medications prescribed by this physician? NO    Reason for visit: RECHECK (Concerns with blood pressure-wondering about labs at [facility])    Anna ESPINO

## 2024-06-07 NOTE — LETTER
6/7/2024       RE: Cathy Freedman  [address] [facility] [address]     Dear Colleague,    Thank you for referring your patient, Cathy Freedman, to the [facility]HEUMATOLOG[facility] [place] at [facility]. Please see a copy of my visit note below.    Type of service:  Video Visit     Virtual Visit Details    Joined the call at 6/7/2024, 3:11:24 pm.  Left the call at 6/7/2024, 3:20:11 pm.    Originating Location (pt. Location): Home  Distant Location (provider location):  Off-site  [facility]      Date of initial visit: 6/25/2021    Last seen: 6/23/2023    DOS: 6/7/2024    Reason for visit: SLE    Patient Active Problem List   Diagnosis    Systemic lupus erythematosus (H)    Immunosuppression (H24)    SLE (systemic lupus erythematosus) (H)    Lupus nephritis, ISN/RPS class IV (H)    Hypertension    Screening for cervical cancer          Subjective:       HPI from last visit with ped rheumatology 1/22/2020:     Cathy is a 19 year old female who was seen in Pediatric Rheumatology clinic today for a follow-up visit of SLE. Cathy is accompanied today by both parents.  Cathy was last seen in clinic on 8/15/2018 by Dr. Lio oJ, at which time she was not taking all of her medications as prescribed but overall did not have any major concerns.  She was lost to follow-up there after, despite multiple attempts we could not get her to return calls or follow-up.  Today she tells me that shortly after that visit she continued to take some of her medications intermittently and is likely out of her medications by the winter 2019.  Approximately 1 to 1-1/2 years ago.  Since then she has felt increasingly well.  She denies any problems with lupus related symptoms such as fever rash joint pain leg swelling.  Her 14 system review as noted below is negative    She was worried increasingly that she may be having some active  lupus and just not be aware and decided she better come back for a follow-up visit.  By her she was on the schedule I asked her to repeat her labs a few days before, those are listed below and are remarkably normal!        6/25/2021: She was last seen by ped rheumatology in 1/2020, she was doing well off meds back then. She was not seen since then given COVID pandemic. She thinks she is doing really good. Few weeks ago, had some joint pain but she was working a lot.    No skin redness, being sluggish or fatigue anymore.    No hair loss.    Has some chronic hair loss over her temporal area.    Gets red from sun exposure, sun burn hurts more but no rashes. Uses sunscreen.    No oral ulcers.    No Joint pain.    No CP, SOB, cough or fevers.    No dry eyes, dry mouth.    No Raynaud's.    No depression.    She wants to know if her internal organs are good or not, likes to do labs.    Has been prescribed OC to control hormonal acne and menstrual cycle, not sexually active. Occasionally drinks wine with her mom. 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     6/20/2022: Cathy presents for follow up. She is doing very well.    Sometime gets sharp pain over low back by turning.    Otherwise no other complaints.    Uses Resurfx effex lumenis for stretch marks.    6/23/2023:    -doing well, no flares of SLE    -reports shooting back pain x 3 mo     -aware of high BP      Today 6/7/2024:    Doing well with lupus, no flares    High BP is an issue, got better by coming off birth control, then went up again    Off losartan for awhile    More tired    Most recent BP was 155/120         ROS: A comprehensive ROS was done. Positives are per HPI.      Past Medical History:   Diagnosis Date    Anemia of chronic disease     Anxious appearance 1/3/2018    Long term systemic steroid user 10/17/2017    Lupus nephritis, ISN/RPS class IV (H)     Non-adherence to medical treatment     Psychosis (H) 1/2/2018    Renal hypertension      Skin breakdown 11/13/2017    SLE (systemic lupus erythematosus related syndrome) (H)        Past Surgical History:   Procedure Laterality Date    PERCUTANEOUS BIOPSY KIDNEY Right 10/6/2017    Procedure: PERCUTANEOUS BIOPSY KIDNEY;  Kidney Biopsy Percutaneous Right ;  Surgeon: Preston Russo MD;  Location: [facility] OR       Family History   Problem Relation Age of Onset    No Known Problems Mother     No Known Problems Father     No Known Problems Sister     No Known Problems Brother     No Known Problems Maternal Grandmother     No Known Problems Maternal Grandfather     No Known Problems Paternal Grandmother     No Known Problems Paternal Grandfather     Diabetes Maternal Aunt     Hypertension Paternal Uncle     Thyroid Disease Other         Cousin: hyperthyroid       Social History     Tobacco Use    Smoking status: Never    Smokeless tobacco: Never   Substance Use Topics    Alcohol use: Yes     Comment: rare         Allergies:     Allergies   Allergen Reactions    Penicillin G      Avoiding with diagnosis of lupus          Medications:     None       Medical --  Family -- Social History:     Past Medical History:   Diagnosis Date    Anemia of chronic disease     Anxious appearance 1/3/2018    Long term systemic steroid user 10/17/2017    Lupus nephritis, ISN/RPS class IV (H)     Non-adherence to medical treatment     Psychosis (H) 1/2/2018    Renal hypertension     Skin breakdown 11/13/2017    SLE (systemic lupus erythematosus related syndrome) (H)      Past Surgical History:   Procedure Laterality Date    PERCUTANEOUS BIOPSY KIDNEY Right 10/6/2017    Procedure: PERCUTANEOUS BIOPSY KIDNEY;  Kidney Biopsy Percutaneous Right ;  Surgeon: Preston Russo MD;  Location: [facility] OR     Family History   Problem Relation Age of Onset    No Known Problems Mother     No Known Problems Father     No Known Problems Sister     No Known Problems Brother     No Known Problems Maternal Grandmother     No Known Problems Maternal  Grandfather     No Known Problems Paternal Grandmother     No Known Problems Paternal Grandfather     Diabetes Maternal Aunt     Hypertension Paternal Uncle     Thyroid Disease Other         Cousin: hyperthyroid     Social History     Social History Narrative    Family History     Father Eliseo: Occup[employer]      Mother Lupe: Occupation      Brother Hakan 07/11/2007: History is negative     Sister Elizabeth 06/11/2000: History is negative     Pat Sister Latasha 09/24/1985: History is negative     Pat Sister Shira 05/15/1988: History is negative     Mat Grandfather: Anemia     Family History: Autoimmune disorder Cousin        Social History     Home/Environment    Lives with: Father, Mother, Siblings. Living situation: Home.        /School/Work    Grade level: She graduated from high school in 2017.  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  Right  kidney biopsied 10/6/2017.    COMPARISONS: Ultrasound renal complete with Doppler 10/4/2017. No  cross sectional imaging available.    ORDERING PROVIDER: JOSÉ ANTONIO SCHULER    TECHNIQUE: B-mode (grayscale) and duplex Doppler evaluation of the  abdominal aorta and renal arteries performed. Velocity measurements  obtained with angle correction at or less than 60 degrees.    Cine clips through the kidneys were saved in the patient's record.      Findings:    AORTA:       Suprarenal: 136/30 cm/s, arterial monophasic       Infrarenal, proximal: 115/24 cm/s, triphasic       Infrarenal, distal: 108/22 cm/s, triphasic    RIGHT KIDNEY:       Renal artery:            Origin: 87/29 cm/s            Proximal: 106/46 cm/s            Mid: 111/46 cm/s            Distal: 98/38 cm/s         Renal artery - aortic ratio: 0.82         Renal vein: 30 cm/s         Length: 11.0 cm         Cortical thickness: 0.6 cm         Arcuate artery resistive index (superior / mid / inferior): 0.55  / 0.52 / 0.62 (previously 0.60 / 0.59 / 0.61)         Parenchyma: Normal. No stone, mass, or hydronephrosis.    LEFT KIDNEY:       Renal artery:            Origin: 82/27 cm/s            Proximal: 70/34 cm/s            Mid: 98/45 cm/s            Distal: 72/38 cm/s         Renal artery - aortic ratio: 0.72         Renal vein: 20 cm/s         Length: 9.9 cm         Cortical thickness: 0.7 cm         Arcuate artery resistive index (superior / mid / inferior): 0.55  / 0.57 / 0.58 (previously 0.59 / 0.55 / 0.57)         Parenchyma: Normal. No stone, mass, or hydronephrosis.      Impression    IMPRESSION:   1. Two right renal arteries were evaluated in the previous study. Only  1 right renal artery identified and evaluated in this study. No cross  sectional imaging available. If clinically indicated, further  evaluation with CTA could be performed.    2. No renal arterial stenosis demonstrated.    3. Normal arcuate artery resistive indices.    4. Subcentimeter cortical thicknesses may suggest medical renal  disease. Otherwise negative grayscale appearance of the bilateral  kidneys.    Guidelines:  Diagnostic criteria suggestive of > 60% diameter stenosis  Renal artery:       Peak systolic velocity > 180 cm/s       RAR > 3.5       Turbulent flow    Arcuate artery Resistive Index Normal < 0.7    HERMES BAUTISTA MD         SYSTEM ID:  [id number]          Last Lab Results:     No visits with results within 2  Day(s) from this visit.   Latest known visit with results is:   Hospital Outpatient Visit on 01/20/2020   Component Date Value    Sed Rate 01/20/2020 6     Complement C4 01/20/2020 19     Complement C3 01/20/2020 107     Sodium 01/20/2020 138     Potassium 01/20/2020 3.7     Chloride 01/20/2020 108     Carbon Dioxide 01/20/2020 25     Anion Gap 01/20/2020 5     Glucose 01/20/2020 79     Urea Nitrogen 01/20/2020 12     Creatinine 01/20/2020 0.74     GFR Estimate 01/20/2020 >90     GFR Estimate If Black 01/20/2020 >90     Calcium 01/20/2020 9.2     Bilirubin Total 01/20/2020 0.5     Albumin 01/20/2020 3.6     Protein Total 01/20/2020 7.4     Alkaline Phosphatase 01/20/2020 68     ALT 01/20/2020 19     AST 01/20/2020 9     DNA-ds 01/20/2020 33*    WBC 01/20/2020 8.0     RBC Count 01/20/2020 5.31*    Hemoglobin 01/20/2020 14.9     Hematocrit 01/20/2020 46.6     MCV 01/20/2020 88     MCH 01/20/2020 28.1     MCHC 01/20/2020 32.0     RDW 01/20/2020 14.1     Platelet Count 01/20/2020 239     Diff Method 01/20/2020 Automated Method     % Neutrophils 01/20/2020 45.6     % Lymphocytes 01/20/2020 41.2     % Monocytes 01/20/2020 5.5     % Eosinophils 01/20/2020 7.0     % Basophils 01/20/2020 0.6     % Immature Granulocytes 01/20/2020 0.1     Nucleated RBCs 01/20/2020 0     Absolute Neutrophil 01/20/2020 3.7     Absolute Lymphocytes 01/20/2020 3.3     Absolute Monocytes 01/20/2020 0.4     Absolute Eosinophils 01/20/2020 0.6     Absolute Basophils 01/20/2020 0.1     Abs Immature Granulocytes 01/20/2020 0.0     Absolute Nucleated RBC 01/20/2020 0.0     Protein Random Urine 01/20/2020 0.41     Protein Total Urine g/gr* 01/20/2020 0.21*    Color Urine 01/20/2020 Yellow     Appearance Urine 01/20/2020 Clear     Glucose Urine 01/20/2020 Negative     Bilirubin Urine 01/20/2020 Negative     Ketones Urine 01/20/2020 Negative     Specific Gravity Urine 01/20/2020 1.026     Blood Urine 01/20/2020 Negative     pH Urine 01/20/2020 6.0      Protein Albumin Urine 01/20/2020 50*    Urobilinogen mg/dL 01/20/2020 Normal     Nitrite Urine 01/20/2020 Negative     Leukocyte Esterase Urine 01/20/2020 Negative     Source 01/20/2020 Midstream Urine     WBC Urine 01/20/2020 1     RBC Urine 01/20/2020 1     Squamous Epithelial /HPF* 01/20/2020 4*    Mucous Urine 01/20/2020 Present*    Creatinine Urine 01/20/2020 197      Component      Latest Ref Rng & Units 7/9/2021   WBC      4.0 - 11.0 10e9/L 9.2   RBC Count      3.8 - 5.2 10e12/L 4.80   Hemoglobin      11.7 - 15.7 g/dL 13.8   Hematocrit      35.0 - 47.0 % 41.8   MCV      78 - 100 fl 87   MCH      26.5 - 33.0 pg 28.8   MCHC      31.5 - 36.5 g/dL 33.0   RDW      10.0 - 15.0 % 14.2   Platelet Count      150 - 450 10e9/L 196   % Neutrophils      % 57.2   % Lymphocytes      % 31.5   % Monocytes      % 6.4   % Eosinophils      % 4.5   % Basophils      % 0.4   Absolute Neutrophil      1.6 - 8.3 10e9/L 5.3   Absolute Lymphocytes      0.8 - 5.3 10e9/L 2.9   Absolute Monocytes      0.0 - 1.3 10e9/L 0.6   Absolute Eosinophils      0.0 - 0.7 10e9/L 0.4   Absolute Basophils      0.0 - 0.2 10e9/L 0.0   Diff Method       Automated Method   Color Urine       Yellow   Appearance Urine       Clear   Glucose Urine      NEG:Negative mg/dL Negative   Bilirubin Urine      NEG:Negative Negative   Ketones Urine      NEG:Negative mg/dL Trace (A)   Specific Gravity Urine      1.003 - 1.035 >1.030   pH Urine      5.0 - 7.0 pH 5.5   Protein Albumin Urine      NEG:Negative mg/dL 30 (A)   Urobilinogen Urine      0.2 - 1.0 EU/dL 0.2   Nitrite Urine      NEG:Negative Negative   Blood Urine      NEG:Negative Negative   Leukocyte Esterase Urine      NEG:Negative Negative   Source       Midstream Urine   WBC Urine      OTO5:0 - 5 /HPF 0 - 5   RBC Urine      OTO2:O - 2 /HPF O - 2   Creatinine      0.52 - 1.04 mg/dL 0.94   GFR Estimate      >60 mL/min/1.73:m2 86   GFR Estimate If Black      >60 mL/min/1.73:m2 >90   Beta 2 Glycoprotein 1 Antibody  IgG      <7 U/mL 0.9   Beta 2 Glycoprotein 1 Antibody IgM      <7 U/mL <2.9   Cardiolipin Antibody IgG      0.0 - 19.9 GPL-U/mL 2.2   Cardiolipin Antibody IgM      0.0 - 19.9 MPL-U/mL 1.1   Protein Random Urine      g/L 0.25   Protein Total Urine g/gr Creatinine      0 - 0.2 g/g Cr 0.13   Beta 2 Glycoprotein 1 Antibody IgA      <7 U/mL 3.1   Cardiolipin Antibody IgA      0.0 - 19.9 APL U/mL 4.9   Lupus Result      NEG:Negative Negative   Vitamin D Deficiency screening      20 - 75 ug/L 29   Creatinine Urine Random      mg/dL 195   Sed Rate      0 - 20 mm/h 9   DNA-ds      <10 IU/mL 44 (H)   CRP Inflammation      0.0 - 8.0 mg/L <2.9   Complement C3      81 - 157 mg/dL 96   Complement C4      13 - 39 mg/dL 15   AST      0 - 45 U/L 11   Albumin      3.4 - 5.0 g/dL 3.4   ALT      0 - 50 U/L 19   Creatinine Urine      mg/dL 195     Component      Latest Ref [facility] 12/22/2022  2:11 PM   WBC      4.0 - 11.0 10e3/uL    RBC Count      3.80 - 5.20 10e6/uL    Hemoglobin      11.7 - 15.7 g/dL    Hematocrit      35.0 - 47.0 %    MCV      78 - 100 fL    MCH      26.5 - 33.0 pg    MCHC      31.5 - 36.5 g/dL    RDW      10.0 - 15.0 %    Platelet Count      150 - 450 10e3/uL    % Neutrophils      %    % Lymphocytes      %    % Monocytes      %    % Eosinophils      %    % Basophils      %    % Immature Granulocytes      %    Absolute Neutrophils      1.6 - 8.3 10e3/uL    Absolute Lymphocytes      0.8 - 5.3 10e3/uL    Absolute Monocytes      0.0 - 1.3 10e3/uL    Absolute Eosinophils      0.0 - 0.7 10e3/uL    Absolute Basophils      0.0 - 0.2 10e3/uL    Absolute Immature Granulocytes      <=0.4 10e3/uL    Color Urine      Colorless, Straw, Light Yellow, Yellow  Yellow    Appearance Urine      Clear  Clear    Glucose Urine      Negative mg/dL Negative    Bilirubin Urine      Negative  Negative    Ketones Urine      Negative mg/dL Negative    Specific Gravity Urine      1.003 - 1.035  >=1.030    Blood Urine      Negative  Negative    pH  Urine      5.0 - 7.0  5.5    Protein Albumin Urine      Negative mg/dL 30 !    Urobilinogen Urine      0.2, 1.0 E.U./dL 0.2    Nitrite Urine      Negative  Negative    Leukocyte Esterase Urine      Negative  Negative    Bacteria Urine      None Seen /HPF Moderate !    RBC Urine      0-2 /HPF /HPF 0-2    WBC Urine      0-5 /HPF /HPF 0-5    Squamous Epithelial /LPF Urine      None Seen /LPF Few !    Mucus Urine      None Seen /LPF Present !    Total Protein Urine mg/dL      mg/dL 25.7    Total Protein UR MG/MG CR      0.00 - 0.20 mg/mg Cr 0.11    Creatinine Urine      mg/dL 228.0    Creatinine      0.51 - 0.95 mg/dL    GFR Estimate      >60 mL/min/1.73m2    Vitamin D Deficiency screening      20 - 75 ug/L    ALT      10 - 35 U/L    Albumin      3.5 - 5.2 g/dL    AST      10 - 35 U/L    Complement C4      13 - 39 mg/dL    Complement C3      81 - 157 mg/dL    CRP Inflammation      <5.00 mg/L    DNA-ds      <10.0 IU/mL    Sed Rate      0 - 20 mm/hr      Component      Latest Ref [facility] 12/22/2022  2:12 PM   WBC      4.0 - 11.0 10e3/uL 9.8    RBC Count      3.80 - 5.20 10e6/uL 5.38 (H)    Hemoglobin      11.7 - 15.7 g/dL 15.5    Hematocrit      35.0 - 47.0 % 47.0    MCV      78 - 100 fL 87    MCH      26.5 - 33.0 pg 28.8    MCHC      31.5 - 36.5 g/dL 33.0    RDW      10.0 - 15.0 % 12.3    Platelet Count      150 - 450 10e3/uL 304    % Neutrophils      % 50    % Lymphocytes      % 39    % Monocytes      % 4    % Eosinophils      % 6    % Basophils      % 1    % Immature Granulocytes      % 0    Absolute Neutrophils      1.6 - 8.3 10e3/uL 4.9    Absolute Lymphocytes      0.8 - 5.3 10e3/uL 3.9    Absolute Monocytes      0.0 - 1.3 10e3/uL 0.4    Absolute Eosinophils      0.0 - 0.7 10e3/uL 0.6    Absolute Basophils      0.0 - 0.2 10e3/uL 0.1    Absolute Immature Granulocytes      <=0.4 10e3/uL 0.0    Color Urine      Colorless, Straw, Light Yellow, Yellow     Appearance Urine      Clear     Glucose Urine      Negative mg/dL     Bilirubin Urine      Negative     Ketones Urine      Negative mg/dL    Specific Gravity Urine      1.003 - 1.035     Blood Urine      Negative     pH Urine      5.0 - 7.0     Protein Albumin Urine      Negative mg/dL    Urobilinogen Urine      0.2, 1.0 E.U./dL    Nitrite Urine      Negative     Leukocyte Esterase Urine      Negative     Bacteria Urine      None Seen /HPF    RBC Urine      0-2 /HPF /HPF    WBC Urine      0-5 /HPF /HPF    Squamous Epithelial /LPF Urine      None Seen /LPF    Mucus Urine      None Seen /LPF    Total Protein Urine mg/dL      mg/dL    Total Protein UR MG/MG CR      0.00 - 0.20 mg/mg Cr    Creatinine Urine      mg/dL    Creatinine      0.51 - 0.95 mg/dL 0.73    GFR Estimate      >60 mL/min/1.73m2 >90    Vitamin D Deficiency screening      20 - 75 ug/L 21    ALT      10 - 35 U/L 15    Albumin      3.5 - 5.2 g/dL 4.5    AST      10 - 35 U/L 23    Complement C4      13 - 39 mg/dL 16    Complement C3      81 - 157 mg/dL 95    CRP Inflammation      <5.00 mg/L <3.00    DNA-ds      <10.0 IU/mL 27.0 (H)    Sed Rate      0 - 20 mm/hr 6       Legend:  ! Abnormal  (H) High      Component      Latest Ref Rn 7/21/2023  11:08 AM 7/21/2023  11:24 AM 8/1/2023  11:20 AM 8/1/2023  11:28 AM   WBC      4.0 - 11.0 10e3/uL 9.0       RBC Count      3.80 - 5.20 10e6/uL 5.74 (H)       Hemoglobin      11.7 - 15.7 g/dL 16.4 (H)       Hematocrit      35.0 - 47.0 % 48.7 (H)       MCV      78 - 100 fL 85       MCH      26.5 - 33.0 pg 28.6       MCHC      31.5 - 36.5 g/dL 33.7       RDW      10.0 - 15.0 % 13.0       Platelet Count      150 - 450 10e3/uL 218       % Neutrophils      % 54       % Lymphocytes      % 36       % Monocytes      % 6       % Eosinophils      % 4       % Basophils      % 1       % Immature Granulocytes      % 0       Absolute Neutrophils      1.6 - 8.3 10e3/uL 4.9       Absolute Lymphocytes      0.8 - 5.3 10e3/uL 3.2       Absolute Monocytes      0.0 - 1.3 10e3/uL 0.5       Absolute  Eosinophils      0.0 - 0.7 10e3/uL 0.4       Absolute Basophils      0.0 - 0.2 10e3/uL 0.1       Absolute Immature Granulocytes      <=0.4 10e3/uL 0.0       Color Urine      Colorless, Straw, Light Yellow, Yellow   Yellow      Appearance Urine      Clear   Clear      Glucose Urine      Negative mg/dL  Negative      Bilirubin Urine      Negative   Negative      Ketones Urine      Negative mg/dL  Negative      Specific Gravity Urine      1.003 - 1.035   1.010      Blood Urine      Negative   Negative      pH Urine      5.0 - 7.0   7.0      Protein Albumin Urine      Negative mg/dL  Negative      Urobilinogen mg/dL      Normal, 2.0 mg/dL       Nitrite Urine      Negative   Negative      Leukocyte Esterase Urine      Negative   Negative      Bacteria Urine      None Seen /HPF  None Seen      RBC Urine      <=2 /HPF  0-2      WBC Urine      <=5 /HPF  0-5      Squamous Epithelial /HPF Urine      <=1 /HPF       Urobilinogen Urine      0.2, 1.0 E.U./dL  0.2      Sodium      136 - 145 mmol/L       Potassium      3.4 - 5.3 mmol/L       Chloride      98 - 107 mmol/L       Carbon Dioxide (CO2)      22 - 29 mmol/L       Anion Gap      7 - 15 mmol/L       Glucose      70 - 99 mg/dL       Urea Nitrogen      6.0 - 20.0 mg/dL       Creatinine      0.51 - 0.95 mg/dL 0.76       GFR Estimate      >60 mL/min/1.73m2 >90       Calcium      8.6 - 10.0 mg/dL       Albumin      3.5 - 5.2 g/dL 4.4       Phosphorus      2.5 - 4.5 mg/dL       Total Protein Urine mg/dL        mg/dL  6.4      Total Protein Urine mg/mg Creat      0.00 - 0.20 mg/mg Cr  0.20      Creatinine Urine      mg/dL  32.4      Metanephrine      0.00 - 0.49 nmol/L   0.18     Normetanephrine      0.00 - 0.89 nmol/L   0.19     Metanephrines Interpretation   See Note     Albumin Urine mg/L      mg/L       Albumin Urine mg/g Cr       ALT      0 - 50 U/L 12       AST      0 - 45 U/L 21       Complement C4      13 - 39 mg/dL 16       Complement C3      81 - 157 mg/dL 105       CRP  Inflammation      <5.00 mg/L <3.00       DNA-ds      <10.0 IU/mL 40.0 (H)       Sed Rate      0 - 20 mm/hr 2       Vitamin D Deficiency screening      20 - 75 ug/L 35       Hepatitis C Antibody      Nonreactive    Nonreactive     N Gonorrhea PCR      Negative     Negative    Chlamydia Trachomatis PCR      Negative     Negative    Aldosterone      0.0 - 31.0 ng/dL   14.5     Renin Activity      ng/mL/hr   2.4     Aldosterone Renin Ratio      0.0 - 25.0    6.0     Erythropoietin      4 - 27 mU/mL         Component      Latest Ref Rng 8/8/2023  12:04 PM 8/8/2023  12:08 PM   WBC      4.0 - 11.0 10e3/uL     RBC Count      3.80 - 5.20 10e6/uL     Hemoglobin      11.7 - 15.7 g/dL 16.0 (H)     Hematocrit      35.0 - 47.0 %     MCV      78 - 100 fL     MCH      26.5 - 33.0 pg     MCHC      31.5 - 36.5 g/dL     RDW      10.0 - 15.0 %     Platelet Count      150 - 450 10e3/uL     % Neutrophils      %     % Lymphocytes      %     % Monocytes      %     % Eosinophils      %     % Basophils      %     % Immature Granulocytes      %     Absolute Neutrophils      1.6 - 8.3 10e3/uL     Absolute Lymphocytes      0.8 - 5.3 10e3/uL     Absolute Monocytes      0.0 - 1.3 10e3/uL     Absolute Eosinophils      0.0 - 0.7 10e3/uL     Absolute Basophils      0.0 - 0.2 10e3/uL     Absolute Immature Granulocytes      <=0.4 10e3/uL     Color Urine      Colorless, Straw, Light Yellow, Yellow   Straw    Appearance Urine      Clear   Clear    Glucose Urine      Negative mg/dL  Negative    Bilirubin Urine      Negative   Negative    Ketones Urine      Negative mg/dL  Negative    Specific Gravity Urine      1.003 - 1.035   1.006    Blood Urine      Negative   Negative    pH Urine      5.0 - 7.0   5.5    Protein Albumin Urine      Negative mg/dL  Negative    Urobilinogen mg/dL      Normal, 2.0 mg/dL  Normal    Nitrite Urine      Negative   Negative    Leukocyte Esterase Urine      Negative   Negative    Bacteria Urine      None Seen /HPF  Few !    RBC  Urine      <=2 /HPF  2    WBC Urine      <=5 /HPF  <1    Squamous Epithelial /HPF Urine      <=1 /HPF  1    Urobilinogen Urine      0.2, 1.0 E.U./dL     Sodium      136 - 145 mmol/L 137     Potassium      3.4 - 5.3 mmol/L 4.0     Chloride      98 - 107 mmol/L 104     Carbon Dioxide (CO2)      22 - 29 mmol/L 23     Anion Gap      7 - 15 mmol/L 10     Glucose      70 - 99 mg/dL 112 (H)     Urea Nitrogen      6.0 - 20.0 mg/dL 11.3     Creatinine      0.51 - 0.95 mg/dL 0.73     GFR Estimate      >60 mL/min/1.73m2 >90     Calcium      8.6 - 10.0 mg/dL 9.5     Albumin      3.5 - 5.2 g/dL 4.3     Phosphorus      2.5 - 4.5 mg/dL 3.6     Total Protein Urine mg/dL        mg/dL     Total Protein Urine mg/mg Creat      0.00 - 0.20 mg/mg Cr     Creatinine Urine      mg/dL  27.7    Metanephrine      0.00 - 0.49 nmol/L     Normetanephrine      0.00 - 0.89 nmol/L     Metanephrines Interpretation     Albumin Urine mg/L      mg/L  <12.0    Albumin Urine mg/g Cr  --    ALT      0 - 50 U/L     AST      0 - 45 U/L     Complement C4      13 - 39 mg/dL     Complement C3      81 - 157 mg/dL     CRP Inflammation      <5.00 mg/L     DNA-ds      <10.0 IU/mL     Sed Rate      0 - 20 mm/hr     Vitamin D Deficiency screening      20 - 75 ug/L     Hepatitis C Antibody      Nonreactive      N Gonorrhea PCR      Negative      Chlamydia Trachomatis PCR      Negative      Aldosterone      0.0 - 31.0 ng/dL     Renin Activity      ng/mL/hr     Aldosterone Renin Ratio      0.0 - 25.0      Erythropoietin      4 - 27 mU/mL 8        Legend:  (H) High  ! Abnormal       Assessment :     Cathy is a 24-year-old F with a history of quite severe systemic lupus erythematosus  Resumed HCQ in 6/2022 per lupus guideline mx and given+ anti-DNA and to prevent flares, doing well, no flares. Stable lupus labs with improved anti-DNA in 12/2022. Low vit D has been replaced.      SLE    HCQ monitoring    ARB monitoring    Vit D deficiency    HTN      Today  6/7/2024:    Stable SLE, stable labs 7, 8/2023. Will continue HCQ. Major issue is high BP, will resume losartan and recommend follow up with nephrology for HTN mx. Reports fatigue.             Recommendations and follow-up:     Continue  mg every day with yearly eye exam    Labs this month including fatigue work up    Go back on losartan 50 mg a day    Follow up with nephrology for high BP    See me in a year in person      Alberto Galvez MD    Orders Placed This Encounter   Procedures    ALT    Albumin level    AST    Creatinine    Complement C4    Complement C3    CRP inflammation    DNA double stranded antibodies    Erythrocyte sedimentation rate auto    UA with Microscopic reflex to Culture    Protein  random urine    Creatinine random urine    Vitamin D Deficiency    Potassium    CBC with Platelets & Differential     Alberto Galvez MD

## 2024-06-07 NOTE — PATIENT INSTRUCTIONS
Labs this month    Go back on losartan 50 mg a day    Follow up with nephrology for high BP    See me in a year in person

## 2024-06-07 NOTE — PROGRESS NOTES
Type of service:  Video Visit     Virtual Visit Details    Joined the call at 6/7/2024, 3:11:24 pm.  Left the call at 6/7/2024, 3:20:11 pm.    Originating Location (pt. Location): Home  Distant Location (provider location):  Off-site  Platform used for Video Visit: Jaime      Date of initial visit: 6/25/2021    Last seen: 6/23/2023    DOS: 6/7/2024    Reason for visit: SLE    Patient Active Problem List   Diagnosis    Systemic lupus erythematosus (H)    Immunosuppression (H24)    SLE (systemic lupus erythematosus) (H)    Lupus nephritis, ISN/RPS class IV (H)    Hypertension    Screening for cervical cancer          Subjective:       HPI from last visit with ped rheumatology 1/22/2020:     Cathy is a 19 year old female who was seen in Pediatric Rheumatology clinic today for a follow-up visit of SLE. Cathy is accompanied today by both parents.  Cathy was last seen in clinic on 8/15/2018 by Dr. Lio Jo, at which time she was not taking all of her medications as prescribed but overall did not have any major concerns.  She was lost to follow-up there after, despite multiple attempts we could not get her to return calls or follow-up.  Today she tells me that shortly after that visit she continued to take some of her medications intermittently and is likely out of her medications by the winter 2019.  Approximately 1 to 1-1/2 years ago.  Since then she has felt increasingly well.  She denies any problems with lupus related symptoms such as fever rash joint pain leg swelling.  Her 14 system review as noted below is negative    She was worried increasingly that she may be having some active lupus and just not be aware and decided she better come back for a follow-up visit.  By her she was on the schedule I asked her to repeat her labs a few days before, those are listed below and are remarkably normal!        6/25/2021: She was last seen by ped rheumatology in 1/2020, she was doing well off meds back then. She was  not seen since then given COVID pandemic. She thinks she is doing really good. Few weeks ago, had some joint pain but she was working a lot.    No skin redness, being sluggish or fatigue anymore.    No hair loss.    Has some chronic hair loss over her temporal area.    Gets red from sun exposure, sun burn hurts more but no rashes. Uses sunscreen.    No oral ulcers.    No Joint pain.    No CP, SOB, cough or fevers.    No dry eyes, dry mouth.    No Raynaud's.    No depression.    She wants to know if her internal organs are good or not, likes to do labs.    Has been prescribed OC to control hormonal acne and menstrual cycle, not sexually active. Occasionally drinks wine with her mom. 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     6/20/2022: Cathy presents for follow up. She is doing very well.    Sometime gets sharp pain over low back by turning.    Otherwise no other complaints.    Uses Resurfx effex lumenis for stretch marks.    6/23/2023:    -doing well, no flares of SLE    -reports shooting back pain x 3 mo     -aware of high BP      Today 6/7/2024:    Doing well with lupus, no flares    High BP is an issue, got better by coming off birth control, then went up again    Off losartan for awhile    More tired    Most recent BP was 155/120         ROS: A comprehensive ROS was done. Positives are per HPI.      Past Medical History:   Diagnosis Date    Anemia of chronic disease     Anxious appearance 1/3/2018    Long term systemic steroid user 10/17/2017    Lupus nephritis, ISN/RPS class IV (H)     Non-adherence to medical treatment     Psychosis (H) 1/2/2018    Renal hypertension     Skin breakdown 11/13/2017    SLE (systemic lupus erythematosus related syndrome) (H)        Past Surgical History:   Procedure Laterality Date    PERCUTANEOUS BIOPSY KIDNEY Right 10/6/2017    Procedure: PERCUTANEOUS BIOPSY KIDNEY;  Kidney Biopsy Percutaneous Right ;  Surgeon: Preston Russo MD;  Location: [facility] OR       Family  History   Problem Relation Age of Onset    No Known Problems Mother     No Known Problems Father     No Known Problems Sister     No Known Problems Brother     No Known Problems Maternal Grandmother     No Known Problems Maternal Grandfather     No Known Problems Paternal Grandmother     No Known Problems Paternal Grandfather     Diabetes Maternal Aunt     Hypertension Paternal Uncle     Thyroid Disease Other         Cousin: hyperthyroid       Social History     Tobacco Use    Smoking status: Never    Smokeless tobacco: Never   Substance Use Topics    Alcohol use: Yes     Comment: rare         Allergies:     Allergies   Allergen Reactions    Penicillin G      Avoiding with diagnosis of lupus          Medications:     None       Medical --  Family -- Social History:     Past Medical History:   Diagnosis Date    Anemia of chronic disease     Anxious appearance 1/3/2018    Long term systemic steroid user 10/17/2017    Lupus nephritis, ISN/RPS class IV (H)     Non-adherence to medical treatment     Psychosis (H) 1/2/2018    Renal hypertension     Skin breakdown 11/13/2017    SLE (systemic lupus erythematosus related syndrome) (H)      Past Surgical History:   Procedure Laterality Date    PERCUTANEOUS BIOPSY KIDNEY Right 10/6/2017    Procedure: PERCUTANEOUS BIOPSY KIDNEY;  Kidney Biopsy Percutaneous Right ;  Surgeon: Preston Russo MD;  Location:  OR     Family History   Problem Relation Age of Onset    No Known Problems Mother     No Known Problems Father     No Known Problems Sister     No Known Problems Brother     No Known Problems Maternal Grandmother     No Known Problems Maternal Grandfather     No Known Problems Paternal Grandmother     No Known Problems Paternal Grandfather     Diabetes Maternal Aunt     Hypertension Paternal Uncle     Thyroid Disease Other         Cousin: hyperthyroid     Social History     Social History Narrative    Family History     Father Eliseo: Occupation [employer]       Mother Andrade: Occupation      Brother Hakan 07/11/2007: History is negative     Sister Elizabeth 06/11/2000: History is negative     Pat Sister Latasha 09/24/1985: History is negative     Pat Sister Shira 05/15/1988: History is negative     Mat Grandfather: Anemia     Family History: Autoimmune disorder Cousin        Social History     Home/Environment    Lives with: Father, Mother, Siblings. Living situation: Home.        /School/Work    Grade level: She graduated from high school in 2017.  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  Right  kidney biopsied 10/6/2017.    COMPARISONS: Ultrasound renal complete with Doppler 10/4/2017. No  cross sectional imaging available.    ORDERING PROVIDER: JOSÉ ANTONIO SCHULER    TECHNIQUE: B-mode (grayscale) and duplex Doppler evaluation of the  abdominal aorta and renal arteries performed. Velocity measurements  obtained with angle correction at or less than 60 degrees.    Cine clips through the kidneys were saved in the patient's record.     Findings:    AORTA:       Suprarenal: 136/30 cm/s, arterial monophasic       Infrarenal, proximal: 115/24 cm/s, triphasic       Infrarenal, distal: 108/22 cm/s, triphasic    RIGHT KIDNEY:       Renal artery:            Origin: 87/29 cm/s            Proximal: 106/46 cm/s            Mid: 111/46 cm/s            Distal: 98/38 cm/s          Renal artery - aortic ratio: 0.82         Renal vein: 30 cm/s         Length: 11.0 cm         Cortical thickness: 0.6 cm         Arcuate artery resistive index (superior / mid / inferior): 0.55  / 0.52 / 0.62 (previously 0.60 / 0.59 / 0.61)         Parenchyma: Normal. No stone, mass, or hydronephrosis.    LEFT KIDNEY:       Renal artery:            Origin: 82/27 cm/s            Proximal: 70/34 cm/s            Mid: 98/45 cm/s            Distal: 72/38 cm/s         Renal artery - aortic ratio: 0.72         Renal vein: 20 cm/s         Length: 9.9 cm         Cortical thickness: 0.7 cm         Arcuate artery resistive index (superior / mid / inferior): 0.55  / 0.57 / 0.58 (previously 0.59 / 0.55 / 0.57)         Parenchyma: Normal. No stone, mass, or hydronephrosis.      Impression    IMPRESSION:   1. Two right renal arteries were evaluated in the previous study. Only  1 right renal artery identified and evaluated in this study. No cross  sectional imaging available. If clinically indicated, further  evaluation with CTA could be performed.    2. No renal arterial stenosis demonstrated.    3. Normal arcuate artery resistive indices.    4. Subcentimeter cortical thicknesses may suggest medical renal  disease. Otherwise negative grayscale appearance of the bilateral  kidneys.    Guidelines:  Diagnostic criteria suggestive of > 60% diameter stenosis  Renal artery:       Peak systolic velocity > 180 cm/s       RAR > 3.5       Turbulent flow    Arcuate artery Resistive Index Normal < 0.7    HERMES BAUTISTA MD         SYSTEM ID:  [id number]          Last Lab Results:     No visits with results within 2 Day(s) from this visit.   Latest known visit with results is:   Hospital Outpatient Visit on 01/20/2020   Component Date Value    Sed Rate 01/20/2020 6     Complement C4 01/20/2020 19     Complement C3 01/20/2020 107     Sodium 01/20/2020 138     Potassium 01/20/2020 3.7     Chloride 01/20/2020 108     Carbon Dioxide 01/20/2020 25      Anion Gap 01/20/2020 5     Glucose 01/20/2020 79     Urea Nitrogen 01/20/2020 12     Creatinine 01/20/2020 0.74     GFR Estimate 01/20/2020 >90     GFR Estimate If Black 01/20/2020 >90     Calcium 01/20/2020 9.2     Bilirubin Total 01/20/2020 0.5     Albumin 01/20/2020 3.6     Protein Total 01/20/2020 7.4     Alkaline Phosphatase 01/20/2020 68     ALT 01/20/2020 19     AST 01/20/2020 9     DNA-ds 01/20/2020 33*    WBC 01/20/2020 8.0     RBC Count 01/20/2020 5.31*    Hemoglobin 01/20/2020 14.9     Hematocrit 01/20/2020 46.6     MCV 01/20/2020 88     MCH 01/20/2020 28.1     MCHC 01/20/2020 32.0     RDW 01/20/2020 14.1     Platelet Count 01/20/2020 239     Diff Method 01/20/2020 Automated Method     % Neutrophils 01/20/2020 45.6     % Lymphocytes 01/20/2020 41.2     % Monocytes 01/20/2020 5.5     % Eosinophils 01/20/2020 7.0     % Basophils 01/20/2020 0.6     % Immature Granulocytes 01/20/2020 0.1     Nucleated RBCs 01/20/2020 0     Absolute Neutrophil 01/20/2020 3.7     Absolute Lymphocytes 01/20/2020 3.3     Absolute Monocytes 01/20/2020 0.4     Absolute Eosinophils 01/20/2020 0.6     Absolute Basophils 01/20/2020 0.1     Abs Immature Granulocytes 01/20/2020 0.0     Absolute Nucleated RBC 01/20/2020 0.0     Protein Random Urine 01/20/2020 0.41     Protein Total Urine g/gr* 01/20/2020 0.21*    Color Urine 01/20/2020 Yellow     Appearance Urine 01/20/2020 Clear     Glucose Urine 01/20/2020 Negative     Bilirubin Urine 01/20/2020 Negative     Ketones Urine 01/20/2020 Negative     Specific Gravity Urine 01/20/2020 1.026     Blood Urine 01/20/2020 Negative     pH Urine 01/20/2020 6.0     Protein Albumin Urine 01/20/2020 50*    Urobilinogen mg/dL 01/20/2020 Normal     Nitrite Urine 01/20/2020 Negative     Leukocyte Esterase Urine 01/20/2020 Negative     Source 01/20/2020 Midstream Urine     WBC Urine 01/20/2020 1     RBC Urine 01/20/2020 1     Squamous Epithelial /HPF* 01/20/2020 4*    Mucous Urine 01/20/2020 Present*     Creatinine Urine 01/20/2020 197      Component      Latest Ref Rng & Units 7/9/2021   WBC      4.0 - 11.0 10e9/L 9.2   RBC Count      3.8 - 5.2 10e12/L 4.80   Hemoglobin      11.7 - 15.7 g/dL 13.8   Hematocrit      35.0 - 47.0 % 41.8   MCV      78 - 100 fl 87   MCH      26.5 - 33.0 pg 28.8   MCHC      31.5 - 36.5 g/dL 33.0   RDW      10.0 - 15.0 % 14.2   Platelet Count      150 - 450 10e9/L 196   % Neutrophils      % 57.2   % Lymphocytes      % 31.5   % Monocytes      % 6.4   % Eosinophils      % 4.5   % Basophils      % 0.4   Absolute Neutrophil      1.6 - 8.3 10e9/L 5.3   Absolute Lymphocytes      0.8 - 5.3 10e9/L 2.9   Absolute Monocytes      0.0 - 1.3 10e9/L 0.6   Absolute Eosinophils      0.0 - 0.7 10e9/L 0.4   Absolute Basophils      0.0 - 0.2 10e9/L 0.0   Diff Method       Automated Method   Color Urine       Yellow   Appearance Urine       Clear   Glucose Urine      NEG:Negative mg/dL Negative   Bilirubin Urine      NEG:Negative Negative   Ketones Urine      NEG:Negative mg/dL Trace (A)   Specific Gravity Urine      1.003 - 1.035 >1.030   pH Urine      5.0 - 7.0 pH 5.5   Protein Albumin Urine      NEG:Negative mg/dL 30 (A)   Urobilinogen Urine      0.2 - 1.0 EU/dL 0.2   Nitrite Urine      NEG:Negative Negative   Blood Urine      NEG:Negative Negative   Leukocyte Esterase Urine      NEG:Negative Negative   Source       Midstream Urine   WBC Urine      OTO5:0 - 5 /HPF 0 - 5   RBC Urine      OTO2:O - 2 /HPF O - 2   Creatinine      0.52 - 1.04 mg/dL 0.94   GFR Estimate      >60 mL/min/1.73:m2 86   GFR Estimate If Black      >60 mL/min/1.73:m2 >90   Beta 2 Glycoprotein 1 Antibody IgG      <7 U/mL 0.9   Beta 2 Glycoprotein 1 Antibody IgM      <7 U/mL <2.9   Cardiolipin Antibody IgG      0.0 - 19.9 GPL-U/mL 2.2   Cardiolipin Antibody IgM      0.0 - 19.9 MPL-U/mL 1.1   Protein Random Urine      g/L 0.25   Protein Total Urine g/gr Creatinine      0 - 0.2 g/g Cr 0.13   Beta 2 Glycoprotein 1 Antibody IgA      <7  U/mL 3.1   Cardiolipin Antibody IgA      0.0 - 19.9 APL U/mL 4.9   Lupus Result      NEG:Negative Negative   Vitamin D Deficiency screening      20 - 75 ug/L 29   Creatinine Urine Random      mg/dL 195   Sed Rate      0 - 20 mm/h 9   DNA-ds      <10 IU/mL 44 (H)   CRP Inflammation      0.0 - 8.0 mg/L <2.9   Complement C3      81 - 157 mg/dL 96   Complement C4      13 - 39 mg/dL 15   AST      0 - 45 U/L 11   Albumin      3.4 - 5.0 g/dL 3.4   ALT      0 - 50 U/L 19   Creatinine Urine      mg/dL 195     Component      Latest Ref [facility] 12/22/2022  2:11 PM   WBC      4.0 - 11.0 10e3/uL    RBC Count      3.80 - 5.20 10e6/uL    Hemoglobin      11.7 - 15.7 g/dL    Hematocrit      35.0 - 47.0 %    MCV      78 - 100 fL    MCH      26.5 - 33.0 pg    MCHC      31.5 - 36.5 g/dL    RDW      10.0 - 15.0 %    Platelet Count      150 - 450 10e3/uL    % Neutrophils      %    % Lymphocytes      %    % Monocytes      %    % Eosinophils      %    % Basophils      %    % Immature Granulocytes      %    Absolute Neutrophils      1.6 - 8.3 10e3/uL    Absolute Lymphocytes      0.8 - 5.3 10e3/uL    Absolute Monocytes      0.0 - 1.3 10e3/uL    Absolute Eosinophils      0.0 - 0.7 10e3/uL    Absolute Basophils      0.0 - 0.2 10e3/uL    Absolute Immature Granulocytes      <=0.4 10e3/uL    Color Urine      Colorless, Straw, Light Yellow, Yellow  Yellow    Appearance Urine      Clear  Clear    Glucose Urine      Negative mg/dL Negative    Bilirubin Urine      Negative  Negative    Ketones Urine      Negative mg/dL Negative    Specific Gravity Urine      1.003 - 1.035  >=1.030    Blood Urine      Negative  Negative    pH Urine      5.0 - 7.0  5.5    Protein Albumin Urine      Negative mg/dL 30 !    Urobilinogen Urine      0.2, 1.0 E.U./dL 0.2    Nitrite Urine      Negative  Negative    Leukocyte Esterase Urine      Negative  Negative    Bacteria Urine      None Seen /HPF Moderate !    RBC Urine      0-2 /HPF /HPF 0-2    WBC Urine      0-5 /HPF /HPF  0-5    Squamous Epithelial /LPF Urine      None Seen /LPF Few !    Mucus Urine      None Seen /LPF Present !    Total Protein Urine mg/dL      mg/dL 25.7    Total Protein UR MG/MG CR      0.00 - 0.20 mg/mg Cr 0.11    Creatinine Urine      mg/dL 228.0    Creatinine      0.51 - 0.95 mg/dL    GFR Estimate      >60 mL/min/1.73m2    Vitamin D Deficiency screening      20 - 75 ug/L    ALT      10 - 35 U/L    Albumin      3.5 - 5.2 g/dL    AST      10 - 35 U/L    Complement C4      13 - 39 mg/dL    Complement C3      81 - 157 mg/dL    CRP Inflammation      <5.00 mg/L    DNA-ds      <10.0 IU/mL    Sed Rate      0 - 20 mm/hr      Component      Latest Ref [facility] 12/22/2022  2:12 PM   WBC      4.0 - 11.0 10e3/uL 9.8    RBC Count      3.80 - 5.20 10e6/uL 5.38 (H)    Hemoglobin      11.7 - 15.7 g/dL 15.5    Hematocrit      35.0 - 47.0 % 47.0    MCV      78 - 100 fL 87    MCH      26.5 - 33.0 pg 28.8    MCHC      31.5 - 36.5 g/dL 33.0    RDW      10.0 - 15.0 % 12.3    Platelet Count      150 - 450 10e3/uL 304    % Neutrophils      % 50    % Lymphocytes      % 39    % Monocytes      % 4    % Eosinophils      % 6    % Basophils      % 1    % Immature Granulocytes      % 0    Absolute Neutrophils      1.6 - 8.3 10e3/uL 4.9    Absolute Lymphocytes      0.8 - 5.3 10e3/uL 3.9    Absolute Monocytes      0.0 - 1.3 10e3/uL 0.4    Absolute Eosinophils      0.0 - 0.7 10e3/uL 0.6    Absolute Basophils      0.0 - 0.2 10e3/uL 0.1    Absolute Immature Granulocytes      <=0.4 10e3/uL 0.0    Color Urine      Colorless, Straw, Light Yellow, Yellow     Appearance Urine      Clear     Glucose Urine      Negative mg/dL    Bilirubin Urine      Negative     Ketones Urine      Negative mg/dL    Specific Gravity Urine      1.003 - 1.035     Blood Urine      Negative     pH Urine      5.0 - 7.0     Protein Albumin Urine      Negative mg/dL    Urobilinogen Urine      0.2, 1.0 E.U./dL    Nitrite Urine      Negative     Leukocyte Esterase Urine      Negative      Bacteria Urine      None Seen /HPF    RBC Urine      0-2 /HPF /HPF    WBC Urine      0-5 /HPF /HPF    Squamous Epithelial /LPF Urine      None Seen /LPF    Mucus Urine      None Seen /LPF    Total Protein Urine mg/dL      mg/dL    Total Protein UR MG/MG CR      0.00 - 0.20 mg/mg Cr    Creatinine Urine      mg/dL    Creatinine      0.51 - 0.95 mg/dL 0.73    GFR Estimate      >60 mL/min/1.73m2 >90    Vitamin D Deficiency screening      20 - 75 ug/L 21    ALT      10 - 35 U/L 15    Albumin      3.5 - 5.2 g/dL 4.5    AST      10 - 35 U/L 23    Complement C4      13 - 39 mg/dL 16    Complement C3      81 - 157 mg/dL 95    CRP Inflammation      <5.00 mg/L <3.00    DNA-ds      <10.0 IU/mL 27.0 (H)    Sed Rate      0 - 20 mm/hr 6       Legend:  ! Abnormal  (H) High      Component      Latest Ref Rng 7/21/2023  11:08 AM 7/21/2023  11:24 AM 8/1/2023  11:20 AM 8/1/2023  11:28 AM   WBC      4.0 - 11.0 10e3/uL 9.0       RBC Count      3.80 - 5.20 10e6/uL 5.74 (H)       Hemoglobin      11.7 - 15.7 g/dL 16.4 (H)       Hematocrit      35.0 - 47.0 % 48.7 (H)       MCV      78 - 100 fL 85       MCH      26.5 - 33.0 pg 28.6       MCHC      31.5 - 36.5 g/dL 33.7       RDW      10.0 - 15.0 % 13.0       Platelet Count      150 - 450 10e3/uL 218       % Neutrophils      % 54       % Lymphocytes      % 36       % Monocytes      % 6       % Eosinophils      % 4       % Basophils      % 1       % Immature Granulocytes      % 0       Absolute Neutrophils      1.6 - 8.3 10e3/uL 4.9       Absolute Lymphocytes      0.8 - 5.3 10e3/uL 3.2       Absolute Monocytes      0.0 - 1.3 10e3/uL 0.5       Absolute Eosinophils      0.0 - 0.7 10e3/uL 0.4       Absolute Basophils      0.0 - 0.2 10e3/uL 0.1       Absolute Immature Granulocytes      <=0.4 10e3/uL 0.0       Color Urine      Colorless, Straw, Light Yellow, Yellow   Yellow      Appearance Urine      Clear   Clear      Glucose Urine      Negative mg/dL  Negative      Bilirubin Urine      Negative    Negative      Ketones Urine      Negative mg/dL  Negative      Specific Gravity Urine      1.003 - 1.035   1.010      Blood Urine      Negative   Negative      pH Urine      5.0 - 7.0   7.0      Protein Albumin Urine      Negative mg/dL  Negative      Urobilinogen mg/dL      Normal, 2.0 mg/dL       Nitrite Urine      Negative   Negative      Leukocyte Esterase Urine      Negative   Negative      Bacteria Urine      None Seen /HPF  None Seen      RBC Urine      <=2 /HPF  0-2      WBC Urine      <=5 /HPF  0-5      Squamous Epithelial /HPF Urine      <=1 /HPF       Urobilinogen Urine      0.2, 1.0 E.U./dL  0.2      Sodium      136 - 145 mmol/L       Potassium      3.4 - 5.3 mmol/L       Chloride      98 - 107 mmol/L       Carbon Dioxide (CO2)      22 - 29 mmol/L       Anion Gap      7 - 15 mmol/L       Glucose      70 - 99 mg/dL       Urea Nitrogen      6.0 - 20.0 mg/dL       Creatinine      0.51 - 0.95 mg/dL 0.76       GFR Estimate      >60 mL/min/1.73m2 >90       Calcium      8.6 - 10.0 mg/dL       Albumin      3.5 - 5.2 g/dL 4.4       Phosphorus      2.5 - 4.5 mg/dL       Total Protein Urine mg/dL        mg/dL  6.4      Total Protein Urine mg/mg Creat      0.00 - 0.20 mg/mg Cr  0.20      Creatinine Urine      mg/dL  32.4      Metanephrine      0.00 - 0.49 nmol/L   0.18     Normetanephrine      0.00 - 0.89 nmol/L   0.19     Metanephrines Interpretation   See Note     Albumin Urine mg/L      mg/L       Albumin Urine mg/g Cr       ALT      0 - 50 U/L 12       AST      0 - 45 U/L 21       Complement C4      13 - 39 mg/dL 16       Complement C3      81 - 157 mg/dL 105       CRP Inflammation      <5.00 mg/L <3.00       DNA-ds      <10.0 IU/mL 40.0 (H)       Sed Rate      0 - 20 mm/hr 2       Vitamin D Deficiency screening      20 - 75 ug/L 35       Hepatitis C Antibody      Nonreactive    Nonreactive     N Gonorrhea PCR      Negative     Negative    Chlamydia Trachomatis PCR      Negative     Negative    Aldosterone       0.0 - 31.0 ng/dL   14.5     Renin Activity      ng/mL/hr   2.4     Aldosterone Renin Ratio      0.0 - 25.0    6.0     Erythropoietin      4 - 27 mU/mL         Component      Latest Ref Rng 8/8/2023  12:04 PM 8/8/2023  12:08 PM   WBC      4.0 - 11.0 10e3/uL     RBC Count      3.80 - 5.20 10e6/uL     Hemoglobin      11.7 - 15.7 g/dL 16.0 (H)     Hematocrit      35.0 - 47.0 %     MCV      78 - 100 fL     MCH      26.5 - 33.0 pg     MCHC      31.5 - 36.5 g/dL     RDW      10.0 - 15.0 %     Platelet Count      150 - 450 10e3/uL     % Neutrophils      %     % Lymphocytes      %     % Monocytes      %     % Eosinophils      %     % Basophils      %     % Immature Granulocytes      %     Absolute Neutrophils      1.6 - 8.3 10e3/uL     Absolute Lymphocytes      0.8 - 5.3 10e3/uL     Absolute Monocytes      0.0 - 1.3 10e3/uL     Absolute Eosinophils      0.0 - 0.7 10e3/uL     Absolute Basophils      0.0 - 0.2 10e3/uL     Absolute Immature Granulocytes      <=0.4 10e3/uL     Color Urine      Colorless, Straw, Light Yellow, Yellow   Straw    Appearance Urine      Clear   Clear    Glucose Urine      Negative mg/dL  Negative    Bilirubin Urine      Negative   Negative    Ketones Urine      Negative mg/dL  Negative    Specific Gravity Urine      1.003 - 1.035   1.006    Blood Urine      Negative   Negative    pH Urine      5.0 - 7.0   5.5    Protein Albumin Urine      Negative mg/dL  Negative    Urobilinogen mg/dL      Normal, 2.0 mg/dL  Normal    Nitrite Urine      Negative   Negative    Leukocyte Esterase Urine      Negative   Negative    Bacteria Urine      None Seen /HPF  Few !    RBC Urine      <=2 /HPF  2    WBC Urine      <=5 /HPF  <1    Squamous Epithelial /HPF Urine      <=1 /HPF  1    Urobilinogen Urine      0.2, 1.0 E.U./dL     Sodium      136 - 145 mmol/L 137     Potassium      3.4 - 5.3 mmol/L 4.0     Chloride      98 - 107 mmol/L 104     Carbon Dioxide (CO2)      22 - 29 mmol/L 23     Anion Gap      7 - 15 mmol/L 10      Glucose      70 - 99 mg/dL 112 (H)     Urea Nitrogen      6.0 - 20.0 mg/dL 11.3     Creatinine      0.51 - 0.95 mg/dL 0.73     GFR Estimate      >60 mL/min/1.73m2 >90     Calcium      8.6 - 10.0 mg/dL 9.5     Albumin      3.5 - 5.2 g/dL 4.3     Phosphorus      2.5 - 4.5 mg/dL 3.6     Total Protein Urine mg/dL        mg/dL     Total Protein Urine mg/mg Creat      0.00 - 0.20 mg/mg Cr     Creatinine Urine      mg/dL  27.7    Metanephrine      0.00 - 0.49 nmol/L     Normetanephrine      0.00 - 0.89 nmol/L     Metanephrines Interpretation     Albumin Urine mg/L      mg/L  <12.0    Albumin Urine mg/g Cr  --    ALT      0 - 50 U/L     AST      0 - 45 U/L     Complement C4      13 - 39 mg/dL     Complement C3      81 - 157 mg/dL     CRP Inflammation      <5.00 mg/L     DNA-ds      <10.0 IU/mL     Sed Rate      0 - 20 mm/hr     Vitamin D Deficiency screening      20 - 75 ug/L     Hepatitis C Antibody      Nonreactive      N Gonorrhea PCR      Negative      Chlamydia Trachomatis PCR      Negative      Aldosterone      0.0 - 31.0 ng/dL     Renin Activity      ng/mL/hr     Aldosterone Renin Ratio      0.0 - 25.0      Erythropoietin      4 - 27 mU/mL 8        Legend:  (H) High  ! Abnormal       Assessment :     Cathy is a 24-year-old F with a history of quite severe systemic lupus erythematosus  Resumed HCQ in 6/2022 per lupus guideline mx and given+ anti-DNA and to prevent flares, doing well, no flares. Stable lupus labs with improved anti-DNA in 12/2022. Low vit D has been replaced.      SLE    HCQ monitoring    ARB monitoring    Vit D deficiency    HTN      Today 6/7/2024:    Stable SLE, stable labs 7, 8/2023. Will continue HCQ. Major issue is high BP, will resume losartan and recommend follow up with nephrology for HTN mx. Reports fatigue.             Recommendations and follow-up:     Continue  mg every day with yearly eye exam    Labs this month including fatigue work up    Go back on losartan 50 mg a  day    Follow up with nephrology for high BP    See me in a year in person      Alberto Galvez MD    Orders Placed This Encounter   Procedures    ALT    Albumin level    AST    Creatinine    Complement C4    Complement C3    CRP inflammation    DNA double stranded antibodies    Erythrocyte sedimentation rate auto    UA with Microscopic reflex to Culture    Protein  random urine    Creatinine random urine    Vitamin D Deficiency    Potassium    CBC with Platelets & Differential

## 2024-06-17 ENCOUNTER — TELEPHONE (OUTPATIENT)
Dept: RHEUMATOLOGY | Facility: CLINIC | Age: 24
End: 2024-06-17
Payer: COMMERCIAL

## 2024-06-17 NOTE — TELEPHONE ENCOUNTER
Patient confirmed scheduled appointment:  Date: June 6th, 2025  Time: 2:30  Visit type: Return Rheumatology  Provider: Dr. Galvez  Location: [facility]  Testing/imaging: NA  Additional notes: NA

## 2024-06-21 ENCOUNTER — LAB (OUTPATIENT)
Dept: LAB | Facility: CLINIC | Age: 24
End: 2024-06-21
Payer: COMMERCIAL

## 2024-06-21 DIAGNOSIS — Z51.81 MEDICATION MONITORING ENCOUNTER: ICD-10-CM

## 2024-06-21 DIAGNOSIS — E55.9 VITAMIN D DEFICIENCY: ICD-10-CM

## 2024-06-21 DIAGNOSIS — Z79.899 LONG-TERM USE OF PLAQUENIL: ICD-10-CM

## 2024-06-21 DIAGNOSIS — I10 HYPERTENSION, UNSPECIFIED TYPE: ICD-10-CM

## 2024-06-21 DIAGNOSIS — M32.9 SYSTEMIC LUPUS ERYTHEMATOSUS, UNSPECIFIED SLE TYPE, UNSPECIFIED ORGAN INVOLVEMENT STATUS (H): ICD-10-CM

## 2024-06-21 LAB
ALBUMIN MFR UR ELPH: 15.6 MG/DL
ALBUMIN SERPL BCG-MCNC: 4.2 G/DL (ref 3.5–5.2)
ALBUMIN UR-MCNC: ABNORMAL MG/DL
ALT SERPL W P-5'-P-CCNC: 15 U/L (ref 0–50)
APPEARANCE UR: CLEAR
AST SERPL W P-5'-P-CCNC: 21 U/L (ref 0–45)
BACTERIA #/AREA URNS HPF: ABNORMAL /HPF
BASOPHILS # BLD AUTO: 0.1 10E3/UL (ref 0–0.2)
BASOPHILS NFR BLD AUTO: 1 %
BILIRUB UR QL STRIP: NEGATIVE
COLOR UR AUTO: YELLOW
CREAT SERPL-MCNC: 0.71 MG/DL (ref 0.51–0.95)
CREAT UR-MCNC: 139 MG/DL
CRP SERPL-MCNC: <3 MG/L
EGFRCR SERPLBLD CKD-EPI 2021: >90 ML/MIN/1.73M2
EOSINOPHIL # BLD AUTO: 0.4 10E3/UL (ref 0–0.7)
EOSINOPHIL NFR BLD AUTO: 5 %
ERYTHROCYTE [DISTWIDTH] IN BLOOD BY AUTOMATED COUNT: 12.7 % (ref 10–15)
ERYTHROCYTE [SEDIMENTATION RATE] IN BLOOD BY WESTERGREN METHOD: 3 MM/HR (ref 0–20)
GLUCOSE UR STRIP-MCNC: NEGATIVE MG/DL
HCT VFR BLD AUTO: 47.9 % (ref 35–47)
HGB BLD-MCNC: 16.1 G/DL (ref 11.7–15.7)
HGB UR QL STRIP: NEGATIVE
IMM GRANULOCYTES # BLD: 0 10E3/UL
IMM GRANULOCYTES NFR BLD: 0 %
KETONES UR STRIP-MCNC: NEGATIVE MG/DL
LEUKOCYTE ESTERASE UR QL STRIP: NEGATIVE
LYMPHOCYTES # BLD AUTO: 3.3 10E3/UL (ref 0.8–5.3)
LYMPHOCYTES NFR BLD AUTO: 38 %
MCH RBC QN AUTO: 28.9 PG (ref 26.5–33)
MCHC RBC AUTO-ENTMCNC: 33.6 G/DL (ref 31.5–36.5)
MCV RBC AUTO: 86 FL (ref 78–100)
MONOCYTES # BLD AUTO: 0.4 10E3/UL (ref 0–1.3)
MONOCYTES NFR BLD AUTO: 5 %
NEUTROPHILS # BLD AUTO: 4.5 10E3/UL (ref 1.6–8.3)
NEUTROPHILS NFR BLD AUTO: 51 %
NITRATE UR QL: NEGATIVE
PH UR STRIP: 6.5 [PH] (ref 5–7)
PLATELET # BLD AUTO: 217 10E3/UL (ref 150–450)
POTASSIUM SERPL-SCNC: 4.1 MMOL/L (ref 3.4–5.3)
PROT/CREAT 24H UR: 0.11 MG/MG CR (ref 0–0.2)
RBC # BLD AUTO: 5.57 10E6/UL (ref 3.8–5.2)
RBC #/AREA URNS AUTO: ABNORMAL /HPF
SP GR UR STRIP: 1.02 (ref 1–1.03)
UROBILINOGEN UR STRIP-ACNC: 0.2 E.U./DL
VIT D+METAB SERPL-MCNC: 39 NG/ML (ref 20–50)
WBC # BLD AUTO: 8.7 10E3/UL (ref 4–11)
WBC #/AREA URNS AUTO: ABNORMAL /HPF

## 2024-06-21 PROCEDURE — 84156 ASSAY OF PROTEIN URINE: CPT

## 2024-06-21 PROCEDURE — 84460 ALANINE AMINO (ALT) (SGPT): CPT

## 2024-06-21 PROCEDURE — 85025 COMPLETE CBC W/AUTO DIFF WBC: CPT

## 2024-06-21 PROCEDURE — 82565 ASSAY OF CREATININE: CPT

## 2024-06-21 PROCEDURE — 84450 TRANSFERASE (AST) (SGOT): CPT

## 2024-06-21 PROCEDURE — 36415 COLL VENOUS BLD VENIPUNCTURE: CPT

## 2024-06-21 PROCEDURE — 84132 ASSAY OF SERUM POTASSIUM: CPT

## 2024-06-21 PROCEDURE — 82306 VITAMIN D 25 HYDROXY: CPT

## 2024-06-21 PROCEDURE — 86160 COMPLEMENT ANTIGEN: CPT

## 2024-06-21 PROCEDURE — 81001 URINALYSIS AUTO W/SCOPE: CPT

## 2024-06-21 PROCEDURE — 86225 DNA ANTIBODY NATIVE: CPT

## 2024-06-21 PROCEDURE — 86140 C-REACTIVE PROTEIN: CPT

## 2024-06-21 PROCEDURE — 82040 ASSAY OF SERUM ALBUMIN: CPT

## 2024-06-21 PROCEDURE — 85652 RBC SED RATE AUTOMATED: CPT

## 2024-06-24 LAB
C3 SERPL-MCNC: 100 MG/DL (ref 81–157)
C4 SERPL-MCNC: 16 MG/DL (ref 13–39)
DSDNA AB SER-ACNC: 32 IU/ML

## 2024-06-26 ENCOUNTER — OFFICE VISIT (OUTPATIENT)
Dept: FAMILY MEDICINE | Facility: CLINIC | Age: 24
End: 2024-06-26
Payer: COMMERCIAL

## 2024-06-26 VITALS
HEART RATE: 89 BPM | DIASTOLIC BLOOD PRESSURE: 88 MMHG | RESPIRATION RATE: 16 BRPM | SYSTOLIC BLOOD PRESSURE: 131 MMHG | TEMPERATURE: 97.9 F | OXYGEN SATURATION: 100 %

## 2024-06-26 DIAGNOSIS — H60.392 OTHER INFECTIVE ACUTE OTITIS EXTERNA OF LEFT EAR: Primary | ICD-10-CM

## 2024-06-26 PROCEDURE — 99213 OFFICE O/P EST LOW 20 MIN: CPT

## 2024-06-26 RX ORDER — CIPROFLOXACIN AND DEXAMETHASONE 3; 1 MG/ML; MG/ML
4 SUSPENSION/ DROPS AURICULAR (OTIC) 2 TIMES DAILY
Qty: 7.5 ML | Refills: 0 | Status: SHIPPED | OUTPATIENT
Start: 2024-06-26 | End: 2024-07-01

## 2024-06-26 NOTE — PROGRESS NOTES
Assessment & Plan     Other infective acute otitis externa of left ear    - ciprofloxacin-dexAMETHasone (CIPRODEX) 0.3-0.1 % otic suspension; Place 4 drops Into the left ear 2 times daily for 5 days        Return in about 1 week (around 7/3/2024), or if symptoms worsen or fail to improve.    Subjective   Alexia is a 24 year old, presenting for the following health issues:  Ear Problem (L ear pain, swelling underneath )    HPI       Left ear pain with some drainage for past 3-4 days. No exposure to pool or lake water. No hx of chronic ear issues. No fever. Has swollen neck glands on the left.         Review of Systems  Constitutional, HEENT, cardiovascular, pulmonary, gi and gu systems are negative, except as otherwise noted.      Objective    /88   Pulse 89   Temp 97.9  F (36.6  C) (Oral)   Resp 16   SpO2 100%   There is no height or weight on file to calculate BMI.  Physical Exam   GENERAL: alert and no distress  EYES: Eyes grossly normal to inspection, PERRL and conjunctivae and sclerae normal  HENT: normal cephalic/atraumatic, right ear: normal: no effusions, no erythema, normal landmarks, left ear: red and boggy canal, nose and mouth without ulcers or lesions, oropharynx clear, and oral mucous membranes moist  NECK: cervical adenopathy left anterior cervical and left pre and post auricular.             Signed Electronically by: [facility]

## 2024-11-24 ENCOUNTER — HEALTH MAINTENANCE LETTER (OUTPATIENT)
Age: 24
End: 2024-11-24

## 2024-12-07 DIAGNOSIS — M32.9 SYSTEMIC LUPUS ERYTHEMATOSUS, UNSPECIFIED SLE TYPE, UNSPECIFIED ORGAN INVOLVEMENT STATUS (H): ICD-10-CM

## 2024-12-07 DIAGNOSIS — I10 HYPERTENSION, UNSPECIFIED TYPE: ICD-10-CM

## 2024-12-12 RX ORDER — LOSARTAN POTASSIUM 50 MG/1
50 TABLET ORAL DAILY
Qty: 90 TABLET | Refills: 1 | Status: SHIPPED | OUTPATIENT
Start: 2024-12-12

## 2024-12-12 NOTE — TELEPHONE ENCOUNTER
HYDROXYCHLOROQUINE 200MG TABLETS     Last Written Prescription Date:  6/7/24  Last Fill Quantity: 90,   # refills: 1  Last Office Visit : 6/7/24  Future Office visit:  6/6/25    Routing refill request to provider for review/approval because:  No eye exam found  Creatinine   Date Value Ref Range Status   06/21/2024 0.71 0.51 - 0.95 mg/dL Final   07/09/2021 0.94 0.52 - 1.04 mg/dL Final

## 2024-12-12 NOTE — TELEPHONE ENCOUNTER
Patient had baseline eye exam on 9/16/2022.  See scanned visit in media tab entered on date on 2/3/2023    Sent patient [facility] message inquiring about an OCT follow up    Elaine Alanis RN  Adult Rheumatology

## 2024-12-17 RX ORDER — HYDROXYCHLOROQUINE SULFATE 200 MG/1
200 TABLET, FILM COATED ORAL DAILY
Qty: 90 TABLET | Refills: 0 | Status: SHIPPED | OUTPATIENT
Start: 2024-12-17

## 2024-12-17 NOTE — TELEPHONE ENCOUNTER
Called patient to inquire about last eye exam. Voicemail does not identify patient, left generic message with call back number.      Plan:  -sending rx to provider to review  -no eye exam in the last year and unable to reach patient.       Frieda Gomez RN  [facility]

## 2025-05-19 ENCOUNTER — MYC MEDICAL ADVICE (OUTPATIENT)
Dept: RHEUMATOLOGY | Facility: CLINIC | Age: 25
End: 2025-05-19
Payer: COMMERCIAL

## 2025-06-06 ENCOUNTER — OFFICE VISIT (OUTPATIENT)
Dept: RHEUMATOLOGY | Facility: CLINIC | Age: 25
End: 2025-06-06
Attending: INTERNAL MEDICINE

## 2025-06-06 VITALS
DIASTOLIC BLOOD PRESSURE: 88 MMHG | BODY MASS INDEX: 26.83 KG/M2 | HEART RATE: 85 BPM | WEIGHT: 142 LBS | SYSTOLIC BLOOD PRESSURE: 129 MMHG | OXYGEN SATURATION: 100 %

## 2025-06-06 DIAGNOSIS — E55.9 VITAMIN D DEFICIENCY: ICD-10-CM

## 2025-06-06 DIAGNOSIS — M32.9 SYSTEMIC LUPUS ERYTHEMATOSUS, UNSPECIFIED SLE TYPE, UNSPECIFIED ORGAN INVOLVEMENT STATUS (H): ICD-10-CM

## 2025-06-06 DIAGNOSIS — Z51.81 MEDICATION MONITORING ENCOUNTER: ICD-10-CM

## 2025-06-06 DIAGNOSIS — I10 HYPERTENSION, UNSPECIFIED TYPE: ICD-10-CM

## 2025-06-06 DIAGNOSIS — Z79.899 LONG-TERM USE OF PLAQUENIL: Primary | ICD-10-CM

## 2025-06-06 DIAGNOSIS — L90.6 STRETCH MARKS: ICD-10-CM

## 2025-06-06 PROCEDURE — 99213 OFFICE O/P EST LOW 20 MIN: CPT | Performed by: INTERNAL MEDICINE

## 2025-06-06 RX ORDER — HYDROXYCHLOROQUINE SULFATE 200 MG/1
200 TABLET, FILM COATED ORAL DAILY
Qty: 90 TABLET | Refills: 1 | Status: SHIPPED | OUTPATIENT
Start: 2025-06-06

## 2025-06-06 RX ORDER — LOSARTAN POTASSIUM 50 MG/1
50 TABLET ORAL DAILY
Qty: 90 TABLET | Refills: 1 | Status: SHIPPED | OUTPATIENT
Start: 2025-06-06

## 2025-06-06 ASSESSMENT — PAIN SCALES - GENERAL: PAINLEVEL_OUTOF10: NO PAIN (0)

## 2025-06-06 NOTE — PROGRESS NOTES
Date of initial visit: 6/25/2021    Last seen: 6/7/2024    DOS: 6/6/2026    Reason for visit: SLE    Patient Active Problem List   Diagnosis    Systemic lupus erythematosus (H)    Immunosuppression    SLE (systemic lupus erythematosus) (H)    Lupus nephritis, ISN/RPS class IV (H)    Hypertension    Screening for cervical cancer          Subjective:       HPI from last visit with ped rheumatology 1/22/2020:     Cathy is a 19 year old female who was seen in Pediatric Rheumatology clinic today for a follow-up visit of SLE. Cathy is accompanied today by both parents.  Cathy was last seen in clinic on 8/15/2018 by Dr. Lio Jo, at which time she was not taking all of her medications as prescribed but overall did not have any major concerns.  She was lost to follow-up there after, despite multiple attempts we could not get her to return calls or follow-up.  Today she tells me that shortly after that visit she continued to take some of her medications intermittently and is likely out of her medications by the winter 2019.  Approximately 1 to 1-1/2 years ago.  Since then she has felt increasingly well.  She denies any problems with lupus related symptoms such as fever rash joint pain leg swelling.  Her 14 system review as noted below is negative    She was worried increasingly that she may be having some active lupus and just not be aware and decided she better come back for a follow-up visit.  By her she was on the schedule I asked her to repeat her labs a few days before, those are listed below and are remarkably normal!        6/25/2021: She was last seen by ped rheumatology in 1/2020, she was doing well off meds back then. She was not seen since then given COVID pandemic. She thinks she is doing really good. Few weeks ago, had some joint pain but she was working a lot.    No skin redness, being sluggish or fatigue anymore.    No hair loss.    Has some chronic hair loss over her temporal area.    Gets red from sun  exposure, sun burn hurts more but no rashes. Uses sunscreen.    No oral ulcers.    No Joint pain.    No CP, SOB, cough or fevers.    No dry eyes, dry mouth.    No Raynaud's.    No depression.    She wants to know if her internal organs are good or not, likes to do labs.    Has been prescribed OC to control hormonal acne and menstrual cycle, not sexually active. Occasionally drinks wine with her mom. 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     6/20/2022: Cathy presents for follow up. She is doing very well.    Sometime gets sharp pain over low back by turning.    Otherwise no other complaints.    Uses Resurfx effex lumenis for stretch marks.    6/23/2023:    -doing well, no flares of SLE    -reports shooting back pain x 3 mo     -aware of high BP       6/7/2024:    Doing well with lupus, no flares    High BP is an issue, got better by coming off birth control, then went up again    Off losartan for awhile    More tired    Most recent BP was 155/120       Today 6/6/2025:    Doing very well with no lupus flare ups    Likes to see derm for stretch marks    Fatigue has improved by madisterion diet    Some recent stress due to change in insurance, has to come off parents' insurance, as turned 25    ROS: A comprehensive ROS was done. Positives are per HPI.      Past Medical History:   Diagnosis Date    Anemia of chronic disease     Anxious appearance 1/3/2018    Long term systemic steroid user 10/17/2017    Lupus nephritis, ISN/RPS class IV (H)     Non-adherence to medical treatment     Psychosis (H) 1/2/2018    Renal hypertension     Skin breakdown 11/13/2017    SLE (systemic lupus erythematosus related syndrome) (H)        Past Surgical History:   Procedure Laterality Date    PERCUTANEOUS BIOPSY KIDNEY Right 10/6/2017    Procedure: PERCUTANEOUS BIOPSY KIDNEY;  Kidney Biopsy Percutaneous Right ;  Surgeon: Preston Russo MD;  Location: [facility] OR       Family History   Problem Relation Age of Onset    No Known  Problems Mother     No Known Problems Father     No Known Problems Sister     No Known Problems Brother     No Known Problems Maternal Grandmother     No Known Problems Maternal Grandfather     No Known Problems Paternal Grandmother     No Known Problems Paternal Grandfather     Diabetes Maternal Aunt     Hypertension Paternal Uncle     Thyroid Disease Other         Cousin: hyperthyroid       Social History     Tobacco Use    Smoking status: Never    Smokeless tobacco: Never   Substance Use Topics    Alcohol use: Yes     Comment: rare         Allergies:     Allergies   Allergen Reactions    Penicillin G      Avoiding with diagnosis of lupus          Medications:     None       Medical --  Family -- Social History:     Past Medical History:   Diagnosis Date    Anemia of chronic disease     Anxious appearance 1/3/2018    Long term systemic steroid user 10/17/2017    Lupus nephritis, ISN/RPS class IV (H)     Non-adherence to medical treatment     Psychosis (H) 1/2/2018    Renal hypertension     Skin breakdown 11/13/2017    SLE (systemic lupus erythematosus related syndrome) (H)      Past Surgical History:   Procedure Laterality Date    PERCUTANEOUS BIOPSY KIDNEY Right 10/6/2017    Procedure: PERCUTANEOUS BIOPSY KIDNEY;  Kidney Biopsy Percutaneous Right ;  Surgeon: Preston Russo MD;  Location: [facility] OR     Family History   Problem Relation Age of Onset    No Known Problems Mother     No Known Problems Father     No Known Problems Sister     No Known Problems Brother     No Known Problems Maternal Grandmother     No Known Problems Maternal Grandfather     No Known Problems Paternal Grandmother     No Known Problems Paternal Grandfather     Diabetes Maternal Aunt     Hypertension Paternal Uncle     Thyroid Disease Other         Cousin: hyperthyroid     Social History     Social History Narrative    Family History     Father Eliseo: Occup[employer]      Mother Lupe: Occupation       Brother Hakan 07/11/2007: History is negative     Sister Elizabeth 06/11/2000: History is negative     Pat Sister Latasha 09/24/1985: History is negative     Pat Sister Shira 05/15/1988: History is negative     Mat Grandfather: Anemia     Family History: Autoimmune disorder Cousin        Social History     Home/Environment    Lives with: Father, Mother, Siblings. Living situation: Home.        /School/Work    Grade level: She graduated from high school in 2017.  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          Examination:   /88   Pulse 85   Wt 64.4 kg (142 lb)   SpO2 100%   BMI 26.83 kg/m        Constitutional: NAD, pleasant  Eyes: nl conj, sclera, EOMI  HEENT: no oral ulcers or thrush  Chest: CTAB  CV: no M/R/G, RRR  Abdomen: soft, NT  Neurologic: non focal  Psychiatric: nl affect  Skin: no rash, +striae  Musculoskeletal: no active synovitis          Last Imaging Results:     Results for orders placed or performed in visit on 08/14/23   US Renal Complete w Arterial Duplex    Narrative    ULTRASOUND RENAL COMPLETE WITH DOPPLER 8/14/2023 8:28 AM    CLINICAL HISTORY: erythropoiesis and new HTN; Hypertension,  unspecified type; Lupus nephritis (H); Abnormal hemoglobin (H).  Right  kidney biopsied 10/6/2017.    COMPARISONS: Ultrasound renal complete with Doppler 10/4/2017. No  cross sectional imaging available.    ORDERING PROVIDER: JOSÉ ANTONIO SCHULER    TECHNIQUE: B-mode (grayscale) and duplex Doppler evaluation of the  abdominal aorta and renal arteries performed. Velocity measurements  obtained with angle correction at or less than 60 degrees.    Cine clips through the kidneys were saved in the patient's record.     Findings:    AORTA:       Suprarenal: 136/30 cm/s, arterial monophasic       Infrarenal, proximal: 115/24 cm/s, triphasic       Infrarenal, distal: 108/22 cm/s, triphasic    RIGHT KIDNEY:       Renal artery:             Origin: 87/29 cm/s            Proximal: 106/46 cm/s            Mid: 111/46 cm/s            Distal: 98/38 cm/s         Renal artery - aortic ratio: 0.82         Renal vein: 30 cm/s         Length: 11.0 cm         Cortical thickness: 0.6 cm         Arcuate artery resistive index (superior / mid / inferior): 0.55  / 0.52 / 0.62 (previously 0.60 / 0.59 / 0.61)         Parenchyma: Normal. No stone, mass, or hydronephrosis.    LEFT KIDNEY:       Renal artery:            Origin: 82/27 cm/s            Proximal: 70/34 cm/s            Mid: 98/45 cm/s            Distal: 72/38 cm/s         Renal artery - aortic ratio: 0.72         Renal vein: 20 cm/s         Length: 9.9 cm         Cortical thickness: 0.7 cm         Arcuate artery resistive index (superior / mid / inferior): 0.55  / 0.57 / 0.58 (previously 0.59 / 0.55 / 0.57)         Parenchyma: Normal. No stone, mass, or hydronephrosis.      Impression    IMPRESSION:   1. Two right renal arteries were evaluated in the previous study. Only  1 right renal artery identified and evaluated in this study. No cross  sectional imaging available. If clinically indicated, further  evaluation with CTA could be performed.    2. No renal arterial stenosis demonstrated.    3. Normal arcuate artery resistive indices.    4. Subcentimeter cortical thicknesses may suggest medical renal  disease. Otherwise negative grayscale appearance of the bilateral  kidneys.    Guidelines:  Diagnostic criteria suggestive of > 60% diameter stenosis  Renal artery:       Peak systolic velocity > 180 cm/s       RAR > 3.5       Turbulent flow    Arcuate artery Resistive Index Normal < 0.7    HERMES BAUTISTA MD         SYSTEM ID:  [id number]          Last Lab Results:     No visits with results within 2 Day(s) from this visit.   Latest known visit with results is:   Hospital Outpatient Visit on 01/20/2020   Component Date Value    Sed Rate 01/20/2020 6     Complement C4 01/20/2020 19     Complement C3 01/20/2020  107     Sodium 01/20/2020 138     Potassium 01/20/2020 3.7     Chloride 01/20/2020 108     Carbon Dioxide 01/20/2020 25     Anion Gap 01/20/2020 5     Glucose 01/20/2020 79     Urea Nitrogen 01/20/2020 12     Creatinine 01/20/2020 0.74     GFR Estimate 01/20/2020 >90     GFR Estimate If Black 01/20/2020 >90     Calcium 01/20/2020 9.2     Bilirubin Total 01/20/2020 0.5     Albumin 01/20/2020 3.6     Protein Total 01/20/2020 7.4     Alkaline Phosphatase 01/20/2020 68     ALT 01/20/2020 19     AST 01/20/2020 9     DNA-ds 01/20/2020 33*    WBC 01/20/2020 8.0     RBC Count 01/20/2020 5.31*    Hemoglobin 01/20/2020 14.9     Hematocrit 01/20/2020 46.6     MCV 01/20/2020 88     MCH 01/20/2020 28.1     MCHC 01/20/2020 32.0     RDW 01/20/2020 14.1     Platelet Count 01/20/2020 239     Diff Method 01/20/2020 Automated Method     % Neutrophils 01/20/2020 45.6     % Lymphocytes 01/20/2020 41.2     % Monocytes 01/20/2020 5.5     % Eosinophils 01/20/2020 7.0     % Basophils 01/20/2020 0.6     % Immature Granulocytes 01/20/2020 0.1     Nucleated RBCs 01/20/2020 0     Absolute Neutrophil 01/20/2020 3.7     Absolute Lymphocytes 01/20/2020 3.3     Absolute Monocytes 01/20/2020 0.4     Absolute Eosinophils 01/20/2020 0.6     Absolute Basophils 01/20/2020 0.1     Abs Immature Granulocytes 01/20/2020 0.0     Absolute Nucleated RBC 01/20/2020 0.0     Protein Random Urine 01/20/2020 0.41     Protein Total Urine g/gr* 01/20/2020 0.21*    Color Urine 01/20/2020 Yellow     Appearance Urine 01/20/2020 Clear     Glucose Urine 01/20/2020 Negative     Bilirubin Urine 01/20/2020 Negative     Ketones Urine 01/20/2020 Negative     Specific Gravity Urine 01/20/2020 1.026     Blood Urine 01/20/2020 Negative     pH Urine 01/20/2020 6.0     Protein Albumin Urine 01/20/2020 50*    Urobilinogen mg/dL 01/20/2020 Normal     Nitrite Urine 01/20/2020 Negative     Leukocyte Esterase Urine 01/20/2020 Negative     Source 01/20/2020 Midstream Urine     WBC Urine  01/20/2020 1     RBC Urine 01/20/2020 1     Squamous Epithelial /HPF* 01/20/2020 4*    Mucous Urine 01/20/2020 Present*    Creatinine Urine 01/20/2020 197      Component      Latest Ref Rng & Units 7/9/2021   WBC      4.0 - 11.0 10e9/L 9.2   RBC Count      3.8 - 5.2 10e12/L 4.80   Hemoglobin      11.7 - 15.7 g/dL 13.8   Hematocrit      35.0 - 47.0 % 41.8   MCV      78 - 100 fl 87   MCH      26.5 - 33.0 pg 28.8   MCHC      31.5 - 36.5 g/dL 33.0   RDW      10.0 - 15.0 % 14.2   Platelet Count      150 - 450 10e9/L 196   % Neutrophils      % 57.2   % Lymphocytes      % 31.5   % Monocytes      % 6.4   % Eosinophils      % 4.5   % Basophils      % 0.4   Absolute Neutrophil      1.6 - 8.3 10e9/L 5.3   Absolute Lymphocytes      0.8 - 5.3 10e9/L 2.9   Absolute Monocytes      0.0 - 1.3 10e9/L 0.6   Absolute Eosinophils      0.0 - 0.7 10e9/L 0.4   Absolute Basophils      0.0 - 0.2 10e9/L 0.0   Diff Method       Automated Method   Color Urine       Yellow   Appearance Urine       Clear   Glucose Urine      NEG:Negative mg/dL Negative   Bilirubin Urine      NEG:Negative Negative   Ketones Urine      NEG:Negative mg/dL Trace (A)   Specific Gravity Urine      1.003 - 1.035 >1.030   pH Urine      5.0 - 7.0 pH 5.5   Protein Albumin Urine      NEG:Negative mg/dL 30 (A)   Urobilinogen Urine      0.2 - 1.0 EU/dL 0.2   Nitrite Urine      NEG:Negative Negative   Blood Urine      NEG:Negative Negative   Leukocyte Esterase Urine      NEG:Negative Negative   Source       Midstream Urine   WBC Urine      OTO5:0 - 5 /HPF 0 - 5   RBC Urine      OTO2:O - 2 /HPF O - 2   Creatinine      0.52 - 1.04 mg/dL 0.94   GFR Estimate      >60 mL/min/1.73:m2 86   GFR Estimate If Black      >60 mL/min/1.73:m2 >90   Beta 2 Glycoprotein 1 Antibody IgG      <7 U/mL 0.9   Beta 2 Glycoprotein 1 Antibody IgM      <7 U/mL <2.9   Cardiolipin Antibody IgG      0.0 - 19.9 GPL-U/mL 2.2   Cardiolipin Antibody IgM      0.0 - 19.9 MPL-U/mL 1.1   Protein Random Urine       g/L 0.25   Protein Total Urine g/gr Creatinine      0 - 0.2 g/g Cr 0.13   Beta 2 Glycoprotein 1 Antibody IgA      <7 U/mL 3.1   Cardiolipin Antibody IgA      0.0 - 19.9 APL U/mL 4.9   Lupus Result      NEG:Negative Negative   Vitamin D Deficiency screening      20 - 75 ug/L 29   Creatinine Urine Random      mg/dL 195   Sed Rate      0 - 20 mm/h 9   DNA-ds      <10 IU/mL 44 (H)   CRP Inflammation      0.0 - 8.0 mg/L <2.9   Complement C3      81 - 157 mg/dL 96   Complement C4      13 - 39 mg/dL 15   AST      0 - 45 U/L 11   Albumin      3.4 - 5.0 g/dL 3.4   ALT      0 - 50 U/L 19   Creatinine Urine      mg/dL 195     Component      Latest Ref [facility] 12/22/2022  2:11 PM   WBC      4.0 - 11.0 10e3/uL    RBC Count      3.80 - 5.20 10e6/uL    Hemoglobin      11.7 - 15.7 g/dL    Hematocrit      35.0 - 47.0 %    MCV      78 - 100 fL    MCH      26.5 - 33.0 pg    MCHC      31.5 - 36.5 g/dL    RDW      10.0 - 15.0 %    Platelet Count      150 - 450 10e3/uL    % Neutrophils      %    % Lymphocytes      %    % Monocytes      %    % Eosinophils      %    % Basophils      %    % Immature Granulocytes      %    Absolute Neutrophils      1.6 - 8.3 10e3/uL    Absolute Lymphocytes      0.8 - 5.3 10e3/uL    Absolute Monocytes      0.0 - 1.3 10e3/uL    Absolute Eosinophils      0.0 - 0.7 10e3/uL    Absolute Basophils      0.0 - 0.2 10e3/uL    Absolute Immature Granulocytes      <=0.4 10e3/uL    Color Urine      Colorless, Straw, Light Yellow, Yellow  Yellow    Appearance Urine      Clear  Clear    Glucose Urine      Negative mg/dL Negative    Bilirubin Urine      Negative  Negative    Ketones Urine      Negative mg/dL Negative    Specific Gravity Urine      1.003 - 1.035  >=1.030    Blood Urine      Negative  Negative    pH Urine      5.0 - 7.0  5.5    Protein Albumin Urine      Negative mg/dL 30 !    Urobilinogen Urine      0.2, 1.0 E.U./dL 0.2    Nitrite Urine      Negative  Negative    Leukocyte Esterase Urine      Negative  Negative     Bacteria Urine      None Seen /HPF Moderate !    RBC Urine      0-2 /HPF /HPF 0-2    WBC Urine      0-5 /HPF /HPF 0-5    Squamous Epithelial /LPF Urine      None Seen /LPF Few !    Mucus Urine      None Seen /LPF Present !    Total Protein Urine mg/dL      mg/dL 25.7    Total Protein UR MG/MG CR      0.00 - 0.20 mg/mg Cr 0.11    Creatinine Urine      mg/dL 228.0    Creatinine      0.51 - 0.95 mg/dL    GFR Estimate      >60 mL/min/1.73m2    Vitamin D Deficiency screening      20 - 75 ug/L    ALT      10 - 35 U/L    Albumin      3.5 - 5.2 g/dL    AST      10 - 35 U/L    Complement C4      13 - 39 mg/dL    Complement C3      81 - 157 mg/dL    CRP Inflammation      <5.00 mg/L    DNA-ds      <10.0 IU/mL    Sed Rate      0 - 20 mm/hr      Component      Latest Ref Rng 12/22/2022  2:12 PM   WBC      4.0 - 11.0 10e3/uL 9.8    RBC Count      3.80 - 5.20 10e6/uL 5.38 (H)    Hemoglobin      11.7 - 15.7 g/dL 15.5    Hematocrit      35.0 - 47.0 % 47.0    MCV      78 - 100 fL 87    MCH      26.5 - 33.0 pg 28.8    MCHC      31.5 - 36.5 g/dL 33.0    RDW      10.0 - 15.0 % 12.3    Platelet Count      150 - 450 10e3/uL 304    % Neutrophils      % 50    % Lymphocytes      % 39    % Monocytes      % 4    % Eosinophils      % 6    % Basophils      % 1    % Immature Granulocytes      % 0    Absolute Neutrophils      1.6 - 8.3 10e3/uL 4.9    Absolute Lymphocytes      0.8 - 5.3 10e3/uL 3.9    Absolute Monocytes      0.0 - 1.3 10e3/uL 0.4    Absolute Eosinophils      0.0 - 0.7 10e3/uL 0.6    Absolute Basophils      0.0 - 0.2 10e3/uL 0.1    Absolute Immature Granulocytes      <=0.4 10e3/uL 0.0    Color Urine      Colorless, Straw, Light Yellow, Yellow     Appearance Urine      Clear     Glucose Urine      Negative mg/dL    Bilirubin Urine      Negative     Ketones Urine      Negative mg/dL    Specific Gravity Urine      1.003 - 1.035     Blood Urine      Negative     pH Urine      5.0 - 7.0     Protein Albumin Urine      Negative mg/dL     Urobilinogen Urine      0.2, 1.0 E.U./dL    Nitrite Urine      Negative     Leukocyte Esterase Urine      Negative     Bacteria Urine      None Seen /HPF    RBC Urine      0-2 /HPF /HPF    WBC Urine      0-5 /HPF /HPF    Squamous Epithelial /LPF Urine      None Seen /LPF    Mucus Urine      None Seen /LPF    Total Protein Urine mg/dL      mg/dL    Total Protein UR MG/MG CR      0.00 - 0.20 mg/mg Cr    Creatinine Urine      mg/dL    Creatinine      0.51 - 0.95 mg/dL 0.73    GFR Estimate      >60 mL/min/1.73m2 >90    Vitamin D Deficiency screening      20 - 75 ug/L 21    ALT      10 - 35 U/L 15    Albumin      3.5 - 5.2 g/dL 4.5    AST      10 - 35 U/L 23    Complement C4      13 - 39 mg/dL 16    Complement C3      81 - 157 mg/dL 95    CRP Inflammation      <5.00 mg/L <3.00    DNA-ds      <10.0 IU/mL 27.0 (H)    Sed Rate      0 - 20 mm/hr 6       Legend:  ! Abnormal  (H) High      Component      Latest Ref Rng 7/21/2023  11:08 AM 7/21/2023  11:24 AM 8/1/2023  11:20 AM 8/1/2023  11:28 AM   WBC      4.0 - 11.0 10e3/uL 9.0       RBC Count      3.80 - 5.20 10e6/uL 5.74 (H)       Hemoglobin      11.7 - 15.7 g/dL 16.4 (H)       Hematocrit      35.0 - 47.0 % 48.7 (H)       MCV      78 - 100 fL 85       MCH      26.5 - 33.0 pg 28.6       MCHC      31.5 - 36.5 g/dL 33.7       RDW      10.0 - 15.0 % 13.0       Platelet Count      150 - 450 10e3/uL 218       % Neutrophils      % 54       % Lymphocytes      % 36       % Monocytes      % 6       % Eosinophils      % 4       % Basophils      % 1       % Immature Granulocytes      % 0       Absolute Neutrophils      1.6 - 8.3 10e3/uL 4.9       Absolute Lymphocytes      0.8 - 5.3 10e3/uL 3.2       Absolute Monocytes      0.0 - 1.3 10e3/uL 0.5       Absolute Eosinophils      0.0 - 0.7 10e3/uL 0.4       Absolute Basophils      0.0 - 0.2 10e3/uL 0.1       Absolute Immature Granulocytes      <=0.4 10e3/uL 0.0       Color Urine      Colorless, Straw, Light Yellow, Yellow   Yellow       Appearance Urine      Clear   Clear      Glucose Urine      Negative mg/dL  Negative      Bilirubin Urine      Negative   Negative      Ketones Urine      Negative mg/dL  Negative      Specific Gravity Urine      1.003 - 1.035   1.010      Blood Urine      Negative   Negative      pH Urine      5.0 - 7.0   7.0      Protein Albumin Urine      Negative mg/dL  Negative      Urobilinogen mg/dL      Normal, 2.0 mg/dL       Nitrite Urine      Negative   Negative      Leukocyte Esterase Urine      Negative   Negative      Bacteria Urine      None Seen /HPF  None Seen      RBC Urine      <=2 /HPF  0-2      WBC Urine      <=5 /HPF  0-5      Squamous Epithelial /HPF Urine      <=1 /HPF       Urobilinogen Urine      0.2, 1.0 E.U./dL  0.2      Sodium      136 - 145 mmol/L       Potassium      3.4 - 5.3 mmol/L       Chloride      98 - 107 mmol/L       Carbon Dioxide (CO2)      22 - 29 mmol/L       Anion Gap      7 - 15 mmol/L       Glucose      70 - 99 mg/dL       Urea Nitrogen      6.0 - 20.0 mg/dL       Creatinine      0.51 - 0.95 mg/dL 0.76       GFR Estimate      >60 mL/min/1.73m2 >90       Calcium      8.6 - 10.0 mg/dL       Albumin      3.5 - 5.2 g/dL 4.4       Phosphorus      2.5 - 4.5 mg/dL       Total Protein Urine mg/dL        mg/dL  6.4      Total Protein Urine mg/mg Creat      0.00 - 0.20 mg/mg Cr  0.20      Creatinine Urine      mg/dL  32.4      Metanephrine      0.00 - 0.49 nmol/L   0.18     Normetanephrine      0.00 - 0.89 nmol/L   0.19     Metanephrines Interpretation   See Note     Albumin Urine mg/L      mg/L       Albumin Urine mg/g Cr       ALT      0 - 50 U/L 12       AST      0 - 45 U/L 21       Complement C4      13 - 39 mg/dL 16       Complement C3      81 - 157 mg/dL 105       CRP Inflammation      <5.00 mg/L <3.00       DNA-ds      <10.0 IU/mL 40.0 (H)       Sed Rate      0 - 20 mm/hr 2       Vitamin D Deficiency screening      20 - 75 ug/L 35       Hepatitis C Antibody      Nonreactive    Nonreactive      N Gonorrhea PCR      Negative     Negative    Chlamydia Trachomatis PCR      Negative     Negative    Aldosterone      0.0 - 31.0 ng/dL   14.5     Renin Activity      ng/mL/hr   2.4     Aldosterone Renin Ratio      0.0 - 25.0    6.0     Erythropoietin      4 - 27 mU/mL         Component      Latest Ref Rng 8/8/2023  12:04 PM 8/8/2023  12:08 PM   WBC      4.0 - 11.0 10e3/uL     RBC Count      3.80 - 5.20 10e6/uL     Hemoglobin      11.7 - 15.7 g/dL 16.0 (H)     Hematocrit      35.0 - 47.0 %     MCV      78 - 100 fL     MCH      26.5 - 33.0 pg     MCHC      31.5 - 36.5 g/dL     RDW      10.0 - 15.0 %     Platelet Count      150 - 450 10e3/uL     % Neutrophils      %     % Lymphocytes      %     % Monocytes      %     % Eosinophils      %     % Basophils      %     % Immature Granulocytes      %     Absolute Neutrophils      1.6 - 8.3 10e3/uL     Absolute Lymphocytes      0.8 - 5.3 10e3/uL     Absolute Monocytes      0.0 - 1.3 10e3/uL     Absolute Eosinophils      0.0 - 0.7 10e3/uL     Absolute Basophils      0.0 - 0.2 10e3/uL     Absolute Immature Granulocytes      <=0.4 10e3/uL     Color Urine      Colorless, Straw, Light Yellow, Yellow   Straw    Appearance Urine      Clear   Clear    Glucose Urine      Negative mg/dL  Negative    Bilirubin Urine      Negative   Negative    Ketones Urine      Negative mg/dL  Negative    Specific Gravity Urine      1.003 - 1.035   1.006    Blood Urine      Negative   Negative    pH Urine      5.0 - 7.0   5.5    Protein Albumin Urine      Negative mg/dL  Negative    Urobilinogen mg/dL      Normal, 2.0 mg/dL  Normal    Nitrite Urine      Negative   Negative    Leukocyte Esterase Urine      Negative   Negative    Bacteria Urine      None Seen /HPF  Few !    RBC Urine      <=2 /HPF  2    WBC Urine      <=5 /HPF  <1    Squamous Epithelial /HPF Urine      <=1 /HPF  1    Urobilinogen Urine      0.2, 1.0 E.U./dL     Sodium      136 - 145 mmol/L 137     Potassium      3.4 - 5.3 mmol/L 4.0      Chloride      98 - 107 mmol/L 104     Carbon Dioxide (CO2)      22 - 29 mmol/L 23     Anion Gap      7 - 15 mmol/L 10     Glucose      70 - 99 mg/dL 112 (H)     Urea Nitrogen      6.0 - 20.0 mg/dL 11.3     Creatinine      0.51 - 0.95 mg/dL 0.73     GFR Estimate      >60 mL/min/1.73m2 >90     Calcium      8.6 - 10.0 mg/dL 9.5     Albumin      3.5 - 5.2 g/dL 4.3     Phosphorus      2.5 - 4.5 mg/dL 3.6     Total Protein Urine mg/dL        mg/dL     Total Protein Urine mg/mg Creat      0.00 - 0.20 mg/mg Cr     Creatinine Urine      mg/dL  27.7    Metanephrine      0.00 - 0.49 nmol/L     Normetanephrine      0.00 - 0.89 nmol/L     Metanephrines Interpretation     Albumin Urine mg/L      mg/L  <12.0    Albumin Urine mg/g Cr  --    ALT      0 - 50 U/L     AST      0 - 45 U/L     Complement C4      13 - 39 mg/dL     Complement C3      81 - 157 mg/dL     CRP Inflammation      <5.00 mg/L     DNA-ds      <10.0 IU/mL     Sed Rate      0 - 20 mm/hr     Vitamin D Deficiency screening      20 - 75 ug/L     Hepatitis C Antibody      Nonreactive      N Gonorrhea PCR      Negative      Chlamydia Trachomatis PCR      Negative      Aldosterone      0.0 - 31.0 ng/dL     Renin Activity      ng/mL/hr     Aldosterone Renin Ratio      0.0 - 25.0      Erythropoietin      4 - 27 mU/mL 8        Legend:  (H) High  ! Abnormal       Assessment :     Cathy is a 24-year-old F with a history of quite severe systemic lupus erythematosus  Resumed HCQ in 6/2022 per lupus guideline mx and given+ anti-DNA and to prevent flares, doing well, no flares. Stable lupus labs with improved anti-DNA in 12/2022. Low vit D has been replaced.      SLE    HCQ monitoring    ARB monitoring    Vit D deficiency    HTN      6/7/2024:    Stable SLE, stable labs 7, 8/2023. Will continue HCQ. Major issue is high BP, will resume losartan and recommend follow up with nephrology for HTN mx. Reports fatigue.    Today 6/6/2025:    Stable SLE. Wants to see derm for  striae.         Recommendations and follow-up:     Continue  mg every day with yearly eye exam, referral was placed      Plan:      Refer for plaquenil eye exam    Labs this month    Refer to derm    Return in a year in person    TT 30 min was spent on date of the encounter doing chart review, history and exam, documentation and further activities as noted above. Any prior notes, outside records, laboratory results, and imaging studies were reviewed if relevant.      The longitudinal plan of care for the diagnosis(es)/condition(s) as documented were addressed during this visit. Due to the added complexity in care, I will continue to support Cathy in the subsequent management and with ongoing continuity of care.      Alberto Galvez MD    Orders Placed This Encounter   Procedures    ALT    Albumin level    AST    Creatinine    Complement C4    Complement C3    CRP inflammation    DNA double stranded antibodies    Erythrocyte sedimentation rate auto    UA with Microscopic reflex to Culture    Protein  random urine    Creatinine random urine    Potassium    Vitamin D Deficiency    Adult Eye  Referral    Adult Dermatology  Referral    CBC with Platelets & Differential

## 2025-06-06 NOTE — NURSING NOTE
"Chief Complaint   Patient presents with    RECHECK     Follow-up        Vital signs:      BP: 129/88 Pulse: 85     SpO2: 100 %       Weight: 64.4 kg (142 lb)  Estimated body mass index is 26.83 kg/m  as calculated from the following:    Height as of 6/7/24: 1.549 m (5' 1\").    Weight as of this encounter: 64.4 kg (142 lb).      Lois Bermudez CMA   6/6/2025 2:17 PM    "

## 2025-06-06 NOTE — LETTER
6/6/2025       RE: Cathy Freedman  [address] [facility] [id number]     Dear Colleague,    Thank you for referring your patient, Cathy Freedman, to the [facility] RHEUMATOLOGY [facility]INIC [place] at [facility]. Please see a copy of my visit note below.    Date of initial visit: 6/25/2021    Last seen: 6/7/2024    DOS: 6/6/2026    Reason for visit: SLE    Patient Active Problem List   Diagnosis     Systemic lupus erythematosus (H)     Immunosuppression     SLE (systemic lupus erythematosus) (H)     Lupus nephritis, ISN/RPS class IV (H)     Hypertension     Screening for cervical cancer          Subjective:       HPI from last visit with ped rheumatology 1/22/2020:     Cathy is a 19 year old female who was seen in Pediatric Rheumatology clinic today for a follow-up visit of SLE. Cathy is accompanied today by both parents.  Cathy was last seen in clinic on 8/15/2018 by Dr. Lio Jo, at which time she was not taking all of her medications as prescribed but overall did not have any major concerns.  She was lost to follow-up there after, despite multiple attempts we could not get her to return calls or follow-up.  Today she tells me that shortly after that visit she continued to take some of her medications intermittently and is likely out of her medications by the winter 2019.  Approximately 1 to 1-1/2 years ago.  Since then she has felt increasingly well.  She denies any problems with lupus related symptoms such as fever rash joint pain leg swelling.  Her 14 system review as noted below is negative    She was worried increasingly that she may be having some active lupus and just not be aware and decided she better come back for a follow-up visit.  By her she was on the schedule I asked her to repeat her labs a few days before, those are listed below and are remarkably normal!        6/25/2021: She was last seen by ped rheumatology in 1/2020,  she was doing well off meds back then. She was not seen since then given COVID pandemic. She thinks she is doing really good. Few weeks ago, had some joint pain but she was working a lot.    No skin redness, being sluggish or fatigue anymore.    No hair loss.    Has some chronic hair loss over her temporal area.    Gets red from sun exposure, sun burn hurts more but no rashes. Uses sunscreen.    No oral ulcers.    No Joint pain.    No CP, SOB, cough or fevers.    No dry eyes, dry mouth.    No Raynaud's.    No depression.    She wants to know if her internal organs are good or not, likes to do labs.    Has been prescribed OC to control hormonal acne and menstrual cycle, not sexually active. Occasionally drinks wine with her mom. 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     6/20/2022: Cathy presents for follow up. She is doing very well.    Sometime gets sharp pain over low back by turning.    Otherwise no other complaints.    Uses Resurfx effex lumenis for stretch marks.    6/23/2023:    -doing well, no flares of SLE    -reports shooting back pain x 3 mo     -aware of high BP       6/7/2024:    Doing well with lupus, no flares    High BP is an issue, got better by coming off birth control, then went up again    Off losartan for awhile    More tired    Most recent BP was 155/120       Today 6/6/2025:    Doing very well with no lupus flare ups    Likes to see derm for stretch marks    Fatigue has improved by madisterion diet    Some recent stress due to change in insurance, has to come off parents' insurance, as turned 25    ROS: A comprehensive ROS was done. Positives are per HPI.      Past Medical History:   Diagnosis Date     Anemia of chronic disease      Anxious appearance 1/3/2018     Long term systemic steroid user 10/17/2017     Lupus nephritis, ISN/RPS class IV (H)      Non-adherence to medical treatment      Psychosis (H) 1/2/2018     Renal hypertension      Skin breakdown 11/13/2017     SLE  (systemic lupus erythematosus related syndrome) (H)        Past Surgical History:   Procedure Laterality Date     PERCUTANEOUS BIOPSY KIDNEY Right 10/6/2017    Procedure: PERCUTANEOUS BIOPSY KIDNEY;  Kidney Biopsy Percutaneous Right ;  Surgeon: Preston Russo MD;  Location: [facility] OR       Family History   Problem Relation Age of Onset     No Known Problems Mother      No Known Problems Father      No Known Problems Sister      No Known Problems Brother      No Known Problems Maternal Grandmother      No Known Problems Maternal Grandfather      No Known Problems Paternal Grandmother      No Known Problems Paternal Grandfather      Diabetes Maternal Aunt      Hypertension Paternal Uncle      Thyroid Disease Other         Cousin: hyperthyroid       Social History     Tobacco Use     Smoking status: Never     Smokeless tobacco: Never   Substance Use Topics     Alcohol use: Yes     Comment: rare         Allergies:     Allergies   Allergen Reactions     Penicillin G      Avoiding with diagnosis of lupus          Medications:     None       Medical --  Family -- Social History:     Past Medical History:   Diagnosis Date     Anemia of chronic disease      Anxious appearance 1/3/2018     Long term systemic steroid user 10/17/2017     Lupus nephritis, ISN/RPS class IV (H)      Non-adherence to medical treatment      Psychosis (H) 1/2/2018     Renal hypertension      Skin breakdown 11/13/2017     SLE (systemic lupus erythematosus related syndrome) (H)      Past Surgical History:   Procedure Laterality Date     PERCUTANEOUS BIOPSY KIDNEY Right 10/6/2017    Procedure: PERCUTANEOUS BIOPSY KIDNEY;  Kidney Biopsy Percutaneous Right ;  Surgeon: Preston Russo MD;  Location: [facility] OR     Family History   Problem Relation Age of Onset     No Known Problems Mother      No Known Problems Father      No Known Problems Sister      No Known Problems Brother      No Known Problems Maternal Grandmother      No Known Problems Maternal  Grandfather      No Known Problems Paternal Grandmother      No Known Problems Paternal Grandfather      Diabetes Maternal Aunt      Hypertension Paternal Uncle      Thyroid Disease Other         Cousin: hyperthyroid     Social History     Social History Narrative    Family History     Father Eliseo: Occup[employer]      Mother Lupe: Occupation      Brother Hakan 07/11/2007: History is negative     Sister Elizabeth 06/11/2000: History is negative     Pat Sister Latasha 09/24/1985: History is negative     Pat Sister Shira 05/15/1988: History is negative     Mat Grandfather: Anemia     Family History: Autoimmune disorder Cousin        Social History     Home/Environment    Lives with: Father, Mother, Siblings. Living situation: Home.        /School/Work    Grade level: She graduated from high school in 2017.  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          Examination:   /88   Pulse 85   Wt 64.4 kg (142 lb)   SpO2 100%   BMI 26.83 kg/m        Constitutional: NAD, pleasant  Eyes: nl conj, sclera, EOMI  HEENT: no oral ulcers or thrush  Chest: CTAB  CV: no M/R/G, RRR  Abdomen: soft, NT  Neurologic: non focal  Psychiatric: nl affect  Skin: no rash, +striae  Musculoskeletal: no active synovitis          Last Imaging Results:     Results for orders placed or performed in visit on 08/14/23   US Renal Complete w Arterial Duplex    Narrative    ULTRASOUND RENAL COMPLETE WITH DOPPLER 8/14/2023 8:28 AM    CLINICAL HISTORY: erythropoiesis and new HTN; Hypertension,  unspecified type; Lupus nephritis (H); Abnormal hemoglobin (H).  Right  kidney biopsied 10/6/2017.    COMPARISONS: Ultrasound renal complete with Doppler 10/4/2017. No  cross sectional imaging available.    ORDERING PROVIDER: JOSÉ ANTONIO SCHULER    TECHNIQUE: B-mode (grayscale) and duplex Doppler evaluation of the  abdominal aorta and renal  arteries performed. Velocity measurements  obtained with angle correction at or less than 60 degrees.    Cine clips through the kidneys were saved in the patient's record.     Findings:    AORTA:       Suprarenal: 136/30 cm/s, arterial monophasic       Infrarenal, proximal: 115/24 cm/s, triphasic       Infrarenal, distal: 108/22 cm/s, triphasic    RIGHT KIDNEY:       Renal artery:            Origin: 87/29 cm/s            Proximal: 106/46 cm/s            Mid: 111/46 cm/s            Distal: 98/38 cm/s         Renal artery - aortic ratio: 0.82         Renal vein: 30 cm/s         Length: 11.0 cm         Cortical thickness: 0.6 cm         Arcuate artery resistive index (superior / mid / inferior): 0.55  / 0.52 / 0.62 (previously 0.60 / 0.59 / 0.61)         Parenchyma: Normal. No stone, mass, or hydronephrosis.    LEFT KIDNEY:       Renal artery:            Origin: 82/27 cm/s            Proximal: 70/34 cm/s            Mid: 98/45 cm/s            Distal: 72/38 cm/s         Renal artery - aortic ratio: 0.72         Renal vein: 20 cm/s         Length: 9.9 cm         Cortical thickness: 0.7 cm         Arcuate artery resistive index (superior / mid / inferior): 0.55  / 0.57 / 0.58 (previously 0.59 / 0.55 / 0.57)         Parenchyma: Normal. No stone, mass, or hydronephrosis.      Impression    IMPRESSION:   1. Two right renal arteries were evaluated in the previous study. Only  1 right renal artery identified and evaluated in this study. No cross  sectional imaging available. If clinically indicated, further  evaluation with CTA could be performed.    2. No renal arterial stenosis demonstrated.    3. Normal arcuate artery resistive indices.    4. Subcentimeter cortical thicknesses may suggest medical renal  disease. Otherwise negative grayscale appearance of the bilateral  kidneys.    Guidelines:  Diagnostic criteria suggestive of > 60% diameter stenosis  Renal artery:       Peak systolic velocity > 180 cm/s       RAR > 3.5        Turbulent flow    Arcuate artery Resistive Index Normal < 0.7    HERMES BAUTISTA MD         SYSTEM ID:  [id number]          Last Lab Results:     No visits with results within 2 Day(s) from this visit.   Latest known visit with results is:   Hospital Outpatient Visit on 01/20/2020   Component Date Value     Sed Rate 01/20/2020 6      Complement C4 01/20/2020 19      Complement C3 01/20/2020 107      Sodium 01/20/2020 138      Potassium 01/20/2020 3.7      Chloride 01/20/2020 108      Carbon Dioxide 01/20/2020 25      Anion Gap 01/20/2020 5      Glucose 01/20/2020 79      Urea Nitrogen 01/20/2020 12      Creatinine 01/20/2020 0.74      GFR Estimate 01/20/2020 >90      GFR Estimate If Black 01/20/2020 >90      Calcium 01/20/2020 9.2      Bilirubin Total 01/20/2020 0.5      Albumin 01/20/2020 3.6      Protein Total 01/20/2020 7.4      Alkaline Phosphatase 01/20/2020 68      ALT 01/20/2020 19      AST 01/20/2020 9      DNA-ds 01/20/2020 33*     WBC 01/20/2020 8.0      RBC Count 01/20/2020 5.31*     Hemoglobin 01/20/2020 14.9      Hematocrit 01/20/2020 46.6      MCV 01/20/2020 88      MCH 01/20/2020 28.1      MCHC 01/20/2020 32.0      RDW 01/20/2020 14.1      Platelet Count 01/20/2020 239      Diff Method 01/20/2020 Automated Method      % Neutrophils 01/20/2020 45.6      % Lymphocytes 01/20/2020 41.2      % Monocytes 01/20/2020 5.5      % Eosinophils 01/20/2020 7.0      % Basophils 01/20/2020 0.6      % Immature Granulocytes 01/20/2020 0.1      Nucleated RBCs 01/20/2020 0      Absolute Neutrophil 01/20/2020 3.7      Absolute Lymphocytes 01/20/2020 3.3      Absolute Monocytes 01/20/2020 0.4      Absolute Eosinophils 01/20/2020 0.6      Absolute Basophils 01/20/2020 0.1      Abs Immature Granulocytes 01/20/2020 0.0      Absolute Nucleated RBC 01/20/2020 0.0      Protein Random Urine 01/20/2020 0.41      Protein Total Urine g/gr* 01/20/2020 0.21*     Color Urine 01/20/2020 Yellow      Appearance Urine 01/20/2020 Clear       Glucose Urine 01/20/2020 Negative      Bilirubin Urine 01/20/2020 Negative      Ketones Urine 01/20/2020 Negative      Specific Gravity Urine 01/20/2020 1.026      Blood Urine 01/20/2020 Negative      pH Urine 01/20/2020 6.0      Protein Albumin Urine 01/20/2020 50*     Urobilinogen mg/dL 01/20/2020 Normal      Nitrite Urine 01/20/2020 Negative      Leukocyte Esterase Urine 01/20/2020 Negative      Source 01/20/2020 Midstream Urine      WBC Urine 01/20/2020 1      RBC Urine 01/20/2020 1      Squamous Epithelial /HPF* 01/20/2020 4*     Mucous Urine 01/20/2020 Present*     Creatinine Urine 01/20/2020 197      Component      Latest Ref Rng & Units 7/9/2021   WBC      4.0 - 11.0 10e9/L 9.2   RBC Count      3.8 - 5.2 10e12/L 4.80   Hemoglobin      11.7 - 15.7 g/dL 13.8   Hematocrit      35.0 - 47.0 % 41.8   MCV      78 - 100 fl 87   MCH      26.5 - 33.0 pg 28.8   MCHC      31.5 - 36.5 g/dL 33.0   RDW      10.0 - 15.0 % 14.2   Platelet Count      150 - 450 10e9/L 196   % Neutrophils      % 57.2   % Lymphocytes      % 31.5   % Monocytes      % 6.4   % Eosinophils      % 4.5   % Basophils      % 0.4   Absolute Neutrophil      1.6 - 8.3 10e9/L 5.3   Absolute Lymphocytes      0.8 - 5.3 10e9/L 2.9   Absolute Monocytes      0.0 - 1.3 10e9/L 0.6   Absolute Eosinophils      0.0 - 0.7 10e9/L 0.4   Absolute Basophils      0.0 - 0.2 10e9/L 0.0   Diff Method       Automated Method   Color Urine       Yellow   Appearance Urine       Clear   Glucose Urine      NEG:Negative mg/dL Negative   Bilirubin Urine      NEG:Negative Negative   Ketones Urine      NEG:Negative mg/dL Trace (A)   Specific Gravity Urine      1.003 - 1.035 >1.030   pH Urine      5.0 - 7.0 pH 5.5   Protein Albumin Urine      NEG:Negative mg/dL 30 (A)   Urobilinogen Urine      0.2 - 1.0 EU/dL 0.2   Nitrite Urine      NEG:Negative Negative   Blood Urine      NEG:Negative Negative   Leukocyte Esterase Urine      NEG:Negative Negative   Source       Midstream Urine   WBC  Urine      OTO5:0 - 5 /HPF 0 - 5   RBC Urine      OTO2:O - 2 /HPF O - 2   Creatinine      0.52 - 1.04 mg/dL 0.94   GFR Estimate      >60 mL/min/1.73:m2 86   GFR Estimate If Black      >60 mL/min/1.73:m2 >90   Beta 2 Glycoprotein 1 Antibody IgG      <7 U/mL 0.9   Beta 2 Glycoprotein 1 Antibody IgM      <7 U/mL <2.9   Cardiolipin Antibody IgG      0.0 - 19.9 GPL-U/mL 2.2   Cardiolipin Antibody IgM      0.0 - 19.9 MPL-U/mL 1.1   Protein Random Urine      g/L 0.25   Protein Total Urine g/gr Creatinine      0 - 0.2 g/g Cr 0.13   Beta 2 Glycoprotein 1 Antibody IgA      <7 U/mL 3.1   Cardiolipin Antibody IgA      0.0 - 19.9 APL U/mL 4.9   Lupus Result      NEG:Negative Negative   Vitamin D Deficiency screening      20 - 75 ug/L 29   Creatinine Urine Random      mg/dL 195   Sed Rate      0 - 20 mm/h 9   DNA-ds      <10 IU/mL 44 (H)   CRP Inflammation      0.0 - 8.0 mg/L <2.9   Complement C3      81 - 157 mg/dL 96   Complement C4      13 - 39 mg/dL 15   AST      0 - 45 U/L 11   Albumin      3.4 - 5.0 g/dL 3.4   ALT      0 - 50 U/L 19   Creatinine Urine      mg/dL 195     Component      Latest Ref [facility] 12/22/2022  2:11 PM   WBC      4.0 - 11.0 10e3/uL    RBC Count      3.80 - 5.20 10e6/uL    Hemoglobin      11.7 - 15.7 g/dL    Hematocrit      35.0 - 47.0 %    MCV      78 - 100 fL    MCH      26.5 - 33.0 pg    MCHC      31.5 - 36.5 g/dL    RDW      10.0 - 15.0 %    Platelet Count      150 - 450 10e3/uL    % Neutrophils      %    % Lymphocytes      %    % Monocytes      %    % Eosinophils      %    % Basophils      %    % Immature Granulocytes      %    Absolute Neutrophils      1.6 - 8.3 10e3/uL    Absolute Lymphocytes      0.8 - 5.3 10e3/uL    Absolute Monocytes      0.0 - 1.3 10e3/uL    Absolute Eosinophils      0.0 - 0.7 10e3/uL    Absolute Basophils      0.0 - 0.2 10e3/uL    Absolute Immature Granulocytes      <=0.4 10e3/uL    Color Urine      Colorless, Straw, Light Yellow, Yellow  Yellow    Appearance Urine      Clear   Clear    Glucose Urine      Negative mg/dL Negative    Bilirubin Urine      Negative  Negative    Ketones Urine      Negative mg/dL Negative    Specific Gravity Urine      1.003 - 1.035  >=1.030    Blood Urine      Negative  Negative    pH Urine      5.0 - 7.0  5.5    Protein Albumin Urine      Negative mg/dL 30 !    Urobilinogen Urine      0.2, 1.0 E.U./dL 0.2    Nitrite Urine      Negative  Negative    Leukocyte Esterase Urine      Negative  Negative    Bacteria Urine      None Seen /HPF Moderate !    RBC Urine      0-2 /HPF /HPF 0-2    WBC Urine      0-5 /HPF /HPF 0-5    Squamous Epithelial /LPF Urine      None Seen /LPF Few !    Mucus Urine      None Seen /LPF Present !    Total Protein Urine mg/dL      mg/dL 25.7    Total Protein UR MG/MG CR      0.00 - 0.20 mg/mg Cr 0.11    Creatinine Urine      mg/dL 228.0    Creatinine      0.51 - 0.95 mg/dL    GFR Estimate      >60 mL/min/1.73m2    Vitamin D Deficiency screening      20 - 75 ug/L    ALT      10 - 35 U/L    Albumin      3.5 - 5.2 g/dL    AST      10 - 35 U/L    Complement C4      13 - 39 mg/dL    Complement C3      81 - 157 mg/dL    CRP Inflammation      <5.00 mg/L    DNA-ds      <10.0 IU/mL    Sed Rate      0 - 20 mm/hr      Component      Latest Ref [facility] 12/22/2022  2:12 PM   WBC      4.0 - 11.0 10e3/uL 9.8    RBC Count      3.80 - 5.20 10e6/uL 5.38 (H)    Hemoglobin      11.7 - 15.7 g/dL 15.5    Hematocrit      35.0 - 47.0 % 47.0    MCV      78 - 100 fL 87    MCH      26.5 - 33.0 pg 28.8    MCHC      31.5 - 36.5 g/dL 33.0    RDW      10.0 - 15.0 % 12.3    Platelet Count      150 - 450 10e3/uL 304    % Neutrophils      % 50    % Lymphocytes      % 39    % Monocytes      % 4    % Eosinophils      % 6    % Basophils      % 1    % Immature Granulocytes      % 0    Absolute Neutrophils      1.6 - 8.3 10e3/uL 4.9    Absolute Lymphocytes      0.8 - 5.3 10e3/uL 3.9    Absolute Monocytes      0.0 - 1.3 10e3/uL 0.4    Absolute Eosinophils      0.0 - 0.7 10e3/uL 0.6     Absolute Basophils      0.0 - 0.2 10e3/uL 0.1    Absolute Immature Granulocytes      <=0.4 10e3/uL 0.0    Color Urine      Colorless, Straw, Light Yellow, Yellow     Appearance Urine      Clear     Glucose Urine      Negative mg/dL    Bilirubin Urine      Negative     Ketones Urine      Negative mg/dL    Specific Gravity Urine      1.003 - 1.035     Blood Urine      Negative     pH Urine      5.0 - 7.0     Protein Albumin Urine      Negative mg/dL    Urobilinogen Urine      0.2, 1.0 E.U./dL    Nitrite Urine      Negative     Leukocyte Esterase Urine      Negative     Bacteria Urine      None Seen /HPF    RBC Urine      0-2 /HPF /HPF    WBC Urine      0-5 /HPF /HPF    Squamous Epithelial /LPF Urine      None Seen /LPF    Mucus Urine      None Seen /LPF    Total Protein Urine mg/dL      mg/dL    Total Protein UR MG/MG CR      0.00 - 0.20 mg/mg Cr    Creatinine Urine      mg/dL    Creatinine      0.51 - 0.95 mg/dL 0.73    GFR Estimate      >60 mL/min/1.73m2 >90    Vitamin D Deficiency screening      20 - 75 ug/L 21    ALT      10 - 35 U/L 15    Albumin      3.5 - 5.2 g/dL 4.5    AST      10 - 35 U/L 23    Complement C4      13 - 39 mg/dL 16    Complement C3      81 - 157 mg/dL 95    CRP Inflammation      <5.00 mg/L <3.00    DNA-ds      <10.0 IU/mL 27.0 (H)    Sed Rate      0 - 20 mm/hr 6       Legend:  ! Abnormal  (H) High      Component      Latest Ref [facility] 7/21/2023  11:08 AM 7/21/2023  11:24 AM 8/1/2023  11:20 AM 8/1/2023  11:28 AM   WBC      4.0 - 11.0 10e3/uL 9.0       RBC Count      3.80 - 5.20 10e6/uL 5.74 (H)       Hemoglobin      11.7 - 15.7 g/dL 16.4 (H)       Hematocrit      35.0 - 47.0 % 48.7 (H)       MCV      78 - 100 fL 85       MCH      26.5 - 33.0 pg 28.6       MCHC      31.5 - 36.5 g/dL 33.7       RDW      10.0 - 15.0 % 13.0       Platelet Count      150 - 450 10e3/uL 218       % Neutrophils      % 54       % Lymphocytes      % 36       % Monocytes      % 6       % Eosinophils      % 4       %  Basophils      % 1       % Immature Granulocytes      % 0       Absolute Neutrophils      1.6 - 8.3 10e3/uL 4.9       Absolute Lymphocytes      0.8 - 5.3 10e3/uL 3.2       Absolute Monocytes      0.0 - 1.3 10e3/uL 0.5       Absolute Eosinophils      0.0 - 0.7 10e3/uL 0.4       Absolute Basophils      0.0 - 0.2 10e3/uL 0.1       Absolute Immature Granulocytes      <=0.4 10e3/uL 0.0       Color Urine      Colorless, Straw, Light Yellow, Yellow   Yellow      Appearance Urine      Clear   Clear      Glucose Urine      Negative mg/dL  Negative      Bilirubin Urine      Negative   Negative      Ketones Urine      Negative mg/dL  Negative      Specific Gravity Urine      1.003 - 1.035   1.010      Blood Urine      Negative   Negative      pH Urine      5.0 - 7.0   7.0      Protein Albumin Urine      Negative mg/dL  Negative      Urobilinogen mg/dL      Normal, 2.0 mg/dL       Nitrite Urine      Negative   Negative      Leukocyte Esterase Urine      Negative   Negative      Bacteria Urine      None Seen /HPF  None Seen      RBC Urine      <=2 /HPF  0-2      WBC Urine      <=5 /HPF  0-5      Squamous Epithelial /HPF Urine      <=1 /HPF       Urobilinogen Urine      0.2, 1.0 E.U./dL  0.2      Sodium      136 - 145 mmol/L       Potassium      3.4 - 5.3 mmol/L       Chloride      98 - 107 mmol/L       Carbon Dioxide (CO2)      22 - 29 mmol/L       Anion Gap      7 - 15 mmol/L       Glucose      70 - 99 mg/dL       Urea Nitrogen      6.0 - 20.0 mg/dL       Creatinine      0.51 - 0.95 mg/dL 0.76       GFR Estimate      >60 mL/min/1.73m2 >90       Calcium      8.6 - 10.0 mg/dL       Albumin      3.5 - 5.2 g/dL 4.4       Phosphorus      2.5 - 4.5 mg/dL       Total Protein Urine mg/dL        mg/dL  6.4      Total Protein Urine mg/mg Creat      0.00 - 0.20 mg/mg Cr  0.20      Creatinine Urine      mg/dL  32.4      Metanephrine      0.00 - 0.49 nmol/L   0.18     Normetanephrine      0.00 - 0.89 nmol/L   0.19     Metanephrines  Interpretation   See Note     Albumin Urine mg/L      mg/L       Albumin Urine mg/g Cr       ALT      0 - 50 U/L 12       AST      0 - 45 U/L 21       Complement C4      13 - 39 mg/dL 16       Complement C3      81 - 157 mg/dL 105       CRP Inflammation      <5.00 mg/L <3.00       DNA-ds      <10.0 IU/mL 40.0 (H)       Sed Rate      0 - 20 mm/hr 2       Vitamin D Deficiency screening      20 - 75 ug/L 35       Hepatitis C Antibody      Nonreactive    Nonreactive     N Gonorrhea PCR      Negative     Negative    Chlamydia Trachomatis PCR      Negative     Negative    Aldosterone      0.0 - 31.0 ng/dL   14.5     Renin Activity      ng/mL/hr   2.4     Aldosterone Renin Ratio      0.0 - 25.0    6.0     Erythropoietin      4 - 27 mU/mL         Component      Latest Ref Rng 8/8/2023  12:04 PM 8/8/2023  12:08 PM   WBC      4.0 - 11.0 10e3/uL     RBC Count      3.80 - 5.20 10e6/uL     Hemoglobin      11.7 - 15.7 g/dL 16.0 (H)     Hematocrit      35.0 - 47.0 %     MCV      78 - 100 fL     MCH      26.5 - 33.0 pg     MCHC      31.5 - 36.5 g/dL     RDW      10.0 - 15.0 %     Platelet Count      150 - 450 10e3/uL     % Neutrophils      %     % Lymphocytes      %     % Monocytes      %     % Eosinophils      %     % Basophils      %     % Immature Granulocytes      %     Absolute Neutrophils      1.6 - 8.3 10e3/uL     Absolute Lymphocytes      0.8 - 5.3 10e3/uL     Absolute Monocytes      0.0 - 1.3 10e3/uL     Absolute Eosinophils      0.0 - 0.7 10e3/uL     Absolute Basophils      0.0 - 0.2 10e3/uL     Absolute Immature Granulocytes      <=0.4 10e3/uL     Color Urine      Colorless, Straw, Light Yellow, Yellow   Straw    Appearance Urine      Clear   Clear    Glucose Urine      Negative mg/dL  Negative    Bilirubin Urine      Negative   Negative    Ketones Urine      Negative mg/dL  Negative    Specific Gravity Urine      1.003 - 1.035   1.006    Blood Urine      Negative   Negative    pH Urine      5.0 - 7.0   5.5    Protein  Albumin Urine      Negative mg/dL  Negative    Urobilinogen mg/dL      Normal, 2.0 mg/dL  Normal    Nitrite Urine      Negative   Negative    Leukocyte Esterase Urine      Negative   Negative    Bacteria Urine      None Seen /HPF  Few !    RBC Urine      <=2 /HPF  2    WBC Urine      <=5 /HPF  <1    Squamous Epithelial /HPF Urine      <=1 /HPF  1    Urobilinogen Urine      0.2, 1.0 E.U./dL     Sodium      136 - 145 mmol/L 137     Potassium      3.4 - 5.3 mmol/L 4.0     Chloride      98 - 107 mmol/L 104     Carbon Dioxide (CO2)      22 - 29 mmol/L 23     Anion Gap      7 - 15 mmol/L 10     Glucose      70 - 99 mg/dL 112 (H)     Urea Nitrogen      6.0 - 20.0 mg/dL 11.3     Creatinine      0.51 - 0.95 mg/dL 0.73     GFR Estimate      >60 mL/min/1.73m2 >90     Calcium      8.6 - 10.0 mg/dL 9.5     Albumin      3.5 - 5.2 g/dL 4.3     Phosphorus      2.5 - 4.5 mg/dL 3.6     Total Protein Urine mg/dL        mg/dL     Total Protein Urine mg/mg Creat      0.00 - 0.20 mg/mg Cr     Creatinine Urine      mg/dL  27.7    Metanephrine      0.00 - 0.49 nmol/L     Normetanephrine      0.00 - 0.89 nmol/L     Metanephrines Interpretation     Albumin Urine mg/L      mg/L  <12.0    Albumin Urine mg/g Cr  --    ALT      0 - 50 U/L     AST      0 - 45 U/L     Complement C4      13 - 39 mg/dL     Complement C3      81 - 157 mg/dL     CRP Inflammation      <5.00 mg/L     DNA-ds      <10.0 IU/mL     Sed Rate      0 - 20 mm/hr     Vitamin D Deficiency screening      20 - 75 ug/L     Hepatitis C Antibody      Nonreactive      N Gonorrhea PCR      Negative      Chlamydia Trachomatis PCR      Negative      Aldosterone      0.0 - 31.0 ng/dL     Renin Activity      ng/mL/hr     Aldosterone Renin Ratio      0.0 - 25.0      Erythropoietin      4 - 27 mU/mL 8        Legend:  (H) High  ! Abnormal       Assessment :     Cathy is a 24-year-old F with a history of quite severe systemic lupus erythematosus  Resumed HCQ in 6/2022 per lupus guideline mx  and given+ anti-DNA and to prevent flares, doing well, no flares. Stable lupus labs with improved anti-DNA in 12/2022. Low vit D has been replaced.      SLE    HCQ monitoring    ARB monitoring    Vit D deficiency    HTN      6/7/2024:    Stable SLE, stable labs 7, 8/2023. Will continue HCQ. Major issue is high BP, will resume losartan and recommend follow up with nephrology for HTN mx. Reports fatigue.    Today 6/6/2025:    Stable SLE. Wants to see derm for striae.         Recommendations and follow-up:     Continue  mg every day with yearly eye exam, referral was placed      Plan:      Refer for plaquenil eye exam    Labs this month    Refer to derm    Return in a year in person    TT 30 min was spent on date of the encounter doing chart review, history and exam, documentation and further activities as noted above. Any prior notes, outside records, laboratory results, and imaging studies were reviewed if relevant.      The longitudinal plan of care for the diagnosis(es)/condition(s) as documented were addressed during this visit. Due to the added complexity in care, I will continue to support Cathy in the subsequent management and with ongoing continuity of care.      Alberto Galvez MD    Orders Placed This Encounter   Procedures     ALT     Albumin level     AST     Creatinine     Complement C4     Complement C3     CRP inflammation     DNA double stranded antibodies     Erythrocyte sedimentation rate auto     UA with Microscopic reflex to Culture     Protein  random urine     Creatinine random urine     Potassium     Vitamin D Deficiency     Adult Eye  Referral     Adult Dermatology  Referral     CBC with Platelets & Differential       Again, thank you for allowing me to participate in the care of your patient.      Sincerely,    Alberto Galvez MD

## 2025-06-09 ENCOUNTER — PATIENT OUTREACH (OUTPATIENT)
Dept: CARE COORDINATION | Facility: CLINIC | Age: 25
End: 2025-06-09

## 2025-06-11 ENCOUNTER — PATIENT OUTREACH (OUTPATIENT)
Dept: CARE COORDINATION | Facility: CLINIC | Age: 25
End: 2025-06-11

## 2025-06-23 DIAGNOSIS — Z79.899 LONG-TERM USE OF PLAQUENIL: Primary | ICD-10-CM

## 2025-06-23 DIAGNOSIS — Z79.899 ENCOUNTER FOR EYE EXAM DUE TO HIGH RISK MEDICATION: ICD-10-CM

## 2025-06-23 NOTE — PROGRESS NOTES
HPI:  Patient presents for a plaquenil eye exam.       Pertinent Medical History:  Hypertension  Systemic lupus erythematosus  Lupus nephritis  Immunosuppression  CKD stage 1    Ocular History:  Myopia, both eyes.     Eye Medications:  None    Assessment and Plan:    #   High Risk Medication - Plaquenil  Visual field 10-2 07/03/2025: Both eyes: normal  Macular OCT 07/03/2025: Both eyes: normal  Macular FAF 07/03/2025: Both eyes: normal  Kidney disease status: Lupus nephritis  Tamoxifen use status: No   heritage/ancestry (Testing look beyond central macula in  patients): No  Patient takes plaquenil 200 mg daily for lupus since 09/20/2016, resumed in 06/2022.   The [employer] recommends a maximum daily plaquenil use of 5.0 mg/kg real weight. Based on this recommendation, the maximum daily dose for this patient is 322.0 mg. High risk characteristics include lupus nephritis and greater and 5 years of plaquenil use.   No plaquenil toxicity seen on exam. Recommend annual dilated eye exam with testing to include visual field 10-2, and macular OCT/FAF.      #   Myopia, both eyes.   Glasses prescription given.            Patient consented to a dilated eye exam:    Yes. Side effects discussed.  Mood/affect: nice    Medical History:  Past Medical History:   Diagnosis Date    Anemia of chronic disease     Anxious appearance 1/3/2018    Long term systemic steroid user 10/17/2017    Lupus nephritis, ISN/RPS class IV (H)     Non-adherence to medical treatment     Psychosis (H) 1/2/2018    Renal hypertension     Skin breakdown 11/13/2017    SLE (systemic lupus erythematosus related syndrome) (H)        Medications:  Current Outpatient Medications   Medication Sig Dispense Refill    hydroxychloroquine (PLAQUENIL) 200 MG tablet Take 1 tablet (200 mg) by mouth daily. 90 tablet 1    losartan (COZAAR) 50 MG tablet Take 1 tablet (50 mg) by mouth daily. 90 tablet 1   Complete documentation of historical  and exam elements from today's encounter can be found in the full encounter summary report (not reduplicated in this progress note). I personally obtained the chief complaint(s) and history of present illness.  I confirmed and edited as necessary the review of systems, past medical/surgical history, family history, social history, and examination findings as documented by others; and I examined the patient myself. I personally reviewed the relevant tests, images, and reports as documented above. I formulated and edited as necessary the assessment and plan and discussed the findings and management plan with the patient and family. - Johnathon Deal, SYDNEY

## 2025-06-25 ENCOUNTER — LAB (OUTPATIENT)
Dept: LAB | Facility: CLINIC | Age: 25
End: 2025-06-25
Payer: COMMERCIAL

## 2025-06-25 DIAGNOSIS — M32.9 SYSTEMIC LUPUS ERYTHEMATOSUS, UNSPECIFIED SLE TYPE, UNSPECIFIED ORGAN INVOLVEMENT STATUS (H): ICD-10-CM

## 2025-06-25 DIAGNOSIS — Z51.81 MEDICATION MONITORING ENCOUNTER: ICD-10-CM

## 2025-06-25 DIAGNOSIS — I10 HYPERTENSION, UNSPECIFIED TYPE: ICD-10-CM

## 2025-06-25 DIAGNOSIS — Z79.899 LONG-TERM USE OF PLAQUENIL: ICD-10-CM

## 2025-06-25 DIAGNOSIS — E55.9 VITAMIN D DEFICIENCY: ICD-10-CM

## 2025-06-25 LAB
ALBUMIN MFR UR ELPH: 11.5 MG/DL
ALBUMIN SERPL BCG-MCNC: 4 G/DL (ref 3.5–5.2)
ALBUMIN UR-MCNC: NEGATIVE MG/DL
ALT SERPL W P-5'-P-CCNC: 14 U/L (ref 0–50)
APPEARANCE UR: CLEAR
AST SERPL W P-5'-P-CCNC: 19 U/L (ref 0–45)
BACTERIA #/AREA URNS HPF: ABNORMAL /HPF
BASOPHILS # BLD AUTO: 0.1 10E3/UL (ref 0–0.2)
BASOPHILS NFR BLD AUTO: 1 %
BILIRUB UR QL STRIP: NEGATIVE
COLOR UR AUTO: YELLOW
CREAT SERPL-MCNC: 0.76 MG/DL (ref 0.51–0.95)
CREAT UR-MCNC: 210 MG/DL
CRP SERPL-MCNC: <3 MG/L
EGFRCR SERPLBLD CKD-EPI 2021: >90 ML/MIN/1.73M2
EOSINOPHIL # BLD AUTO: 0.5 10E3/UL (ref 0–0.7)
EOSINOPHIL NFR BLD AUTO: 5 %
ERYTHROCYTE [DISTWIDTH] IN BLOOD BY AUTOMATED COUNT: 12.9 % (ref 10–15)
ERYTHROCYTE [SEDIMENTATION RATE] IN BLOOD BY WESTERGREN METHOD: 3 MM/HR (ref 0–20)
GLUCOSE UR STRIP-MCNC: NEGATIVE MG/DL
HCT VFR BLD AUTO: 43.3 % (ref 35–47)
HGB BLD-MCNC: 14.5 G/DL (ref 11.7–15.7)
HGB UR QL STRIP: NEGATIVE
IMM GRANULOCYTES # BLD: 0 10E3/UL
IMM GRANULOCYTES NFR BLD: 0 %
KETONES UR STRIP-MCNC: NEGATIVE MG/DL
LEUKOCYTE ESTERASE UR QL STRIP: NEGATIVE
LYMPHOCYTES # BLD AUTO: 2.9 10E3/UL (ref 0.8–5.3)
LYMPHOCYTES NFR BLD AUTO: 30 %
MCH RBC QN AUTO: 29.8 PG (ref 26.5–33)
MCHC RBC AUTO-ENTMCNC: 33.5 G/DL (ref 31.5–36.5)
MCV RBC AUTO: 89 FL (ref 78–100)
MONOCYTES # BLD AUTO: 0.6 10E3/UL (ref 0–1.3)
MONOCYTES NFR BLD AUTO: 6 %
MUCOUS THREADS #/AREA URNS LPF: PRESENT /LPF
NEUTROPHILS # BLD AUTO: 5.8 10E3/UL (ref 1.6–8.3)
NEUTROPHILS NFR BLD AUTO: 59 %
NITRATE UR QL: NEGATIVE
PH UR STRIP: 6 [PH] (ref 5–7)
PLATELET # BLD AUTO: 209 10E3/UL (ref 150–450)
POTASSIUM SERPL-SCNC: 3.9 MMOL/L (ref 3.4–5.3)
PROT/CREAT 24H UR: 0.05 MG/MG CR (ref 0–0.2)
RBC # BLD AUTO: 4.86 10E6/UL (ref 3.8–5.2)
RBC #/AREA URNS AUTO: ABNORMAL /HPF
SP GR UR STRIP: >=1.03 (ref 1–1.03)
SQUAMOUS #/AREA URNS AUTO: ABNORMAL /LPF
UROBILINOGEN UR STRIP-ACNC: 0.2 E.U./DL
VIT D+METAB SERPL-MCNC: 26 NG/ML (ref 20–50)
WBC # BLD AUTO: 9.8 10E3/UL (ref 4–11)
WBC #/AREA URNS AUTO: ABNORMAL /HPF

## 2025-06-25 PROCEDURE — 82565 ASSAY OF CREATININE: CPT

## 2025-06-25 PROCEDURE — 86225 DNA ANTIBODY NATIVE: CPT

## 2025-06-25 PROCEDURE — 82306 VITAMIN D 25 HYDROXY: CPT

## 2025-06-25 PROCEDURE — 84156 ASSAY OF PROTEIN URINE: CPT

## 2025-06-25 PROCEDURE — 86160 COMPLEMENT ANTIGEN: CPT

## 2025-06-25 PROCEDURE — 82040 ASSAY OF SERUM ALBUMIN: CPT

## 2025-06-25 PROCEDURE — 85652 RBC SED RATE AUTOMATED: CPT

## 2025-06-25 PROCEDURE — 36415 COLL VENOUS BLD VENIPUNCTURE: CPT

## 2025-06-25 PROCEDURE — 85025 COMPLETE CBC W/AUTO DIFF WBC: CPT

## 2025-06-25 PROCEDURE — 86140 C-REACTIVE PROTEIN: CPT

## 2025-06-25 PROCEDURE — 81001 URINALYSIS AUTO W/SCOPE: CPT

## 2025-06-25 PROCEDURE — 84450 TRANSFERASE (AST) (SGOT): CPT

## 2025-06-25 PROCEDURE — 84460 ALANINE AMINO (ALT) (SGPT): CPT

## 2025-06-25 PROCEDURE — 84132 ASSAY OF SERUM POTASSIUM: CPT

## 2025-06-26 LAB
C3 SERPL-MCNC: 87 MG/DL (ref 81–157)
C4 SERPL-MCNC: 13 MG/DL (ref 13–39)
DSDNA AB SER-ACNC: 28 IU/ML

## 2025-06-30 ENCOUNTER — RESULTS FOLLOW-UP (OUTPATIENT)
Dept: RHEUMATOLOGY | Facility: CLINIC | Age: 25
End: 2025-06-30

## 2025-06-30 DIAGNOSIS — E55.9 VITAMIN D DEFICIENCY: Primary | ICD-10-CM

## 2025-06-30 RX ORDER — ERGOCALCIFEROL 1.25 MG/1
50000 CAPSULE, LIQUID FILLED ORAL
Qty: 12 CAPSULE | Refills: 0 | Status: SHIPPED | OUTPATIENT
Start: 2025-06-30

## 2025-07-03 ENCOUNTER — OFFICE VISIT (OUTPATIENT)
Dept: OPHTHALMOLOGY | Facility: CLINIC | Age: 25
End: 2025-07-03
Attending: OPTOMETRIST
Payer: COMMERCIAL

## 2025-07-03 DIAGNOSIS — Z79.899 LONG-TERM USE OF PLAQUENIL: ICD-10-CM

## 2025-07-03 DIAGNOSIS — Z79.899 ENCOUNTER FOR EYE EXAM DUE TO HIGH RISK MEDICATION: ICD-10-CM

## 2025-07-03 DIAGNOSIS — H52.13 MYOPIA OF BOTH EYES: Primary | ICD-10-CM

## 2025-07-03 PROCEDURE — 92250 FUNDUS PHOTOGRAPHY W/I&R: CPT | Performed by: OPTOMETRIST

## 2025-07-03 PROCEDURE — 99214 OFFICE O/P EST MOD 30 MIN: CPT | Performed by: OPTOMETRIST

## 2025-07-03 PROCEDURE — 92134 CPTRZ OPH DX IMG PST SGM RTA: CPT | Performed by: OPTOMETRIST

## 2025-07-03 PROCEDURE — 92082 INTERMEDIATE VISUAL FIELD XM: CPT | Performed by: OPTOMETRIST

## 2025-07-03 ASSESSMENT — REFRACTION_MANIFEST
OS_SPHERE: -4.50
OD_SPHERE: -4.50
OS_CYLINDER: +1.00
OS_AXIS: 100
OD_AXIS: 090
OD_CYLINDER: +1.00

## 2025-07-03 ASSESSMENT — EXTERNAL EXAM - RIGHT EYE: OD_EXAM: NORMAL

## 2025-07-03 ASSESSMENT — CONF VISUAL FIELD
OS_NORMAL: 1
OS_INFERIOR_NASAL_RESTRICTION: 0
OS_SUPERIOR_TEMPORAL_RESTRICTION: 0
OS_INFERIOR_TEMPORAL_RESTRICTION: 0
OS_SUPERIOR_NASAL_RESTRICTION: 0
OD_INFERIOR_NASAL_RESTRICTION: 0
METHOD: COUNTING FINGERS
OD_INFERIOR_TEMPORAL_RESTRICTION: 0
OD_NORMAL: 1
OD_SUPERIOR_NASAL_RESTRICTION: 0
OD_SUPERIOR_TEMPORAL_RESTRICTION: 0

## 2025-07-03 ASSESSMENT — EXTERNAL EXAM - LEFT EYE: OS_EXAM: NORMAL

## 2025-07-03 ASSESSMENT — TONOMETRY
OS_IOP_MMHG: 15
IOP_METHOD: TONOPEN
OD_IOP_MMHG: 15

## 2025-07-03 ASSESSMENT — SLIT LAMP EXAM - LIDS
COMMENTS: NORMAL
COMMENTS: NORMAL

## 2025-07-03 ASSESSMENT — REFRACTION_WEARINGRX
OD_AXIS: 091
OS_SPHERE: -4.50
OS_AXIS: 101
OD_CYLINDER: +1.00
OD_SPHERE: -4.50
OS_CYLINDER: +1.00

## 2025-07-03 ASSESSMENT — VISUAL ACUITY
OD_CC: 20/15
CORRECTION_TYPE: GLASSES
OS_CC: 20/20
METHOD: SNELLEN - LINEAR

## 2025-07-14 ENCOUNTER — MYC REFILL (OUTPATIENT)
Dept: RHEUMATOLOGY | Facility: CLINIC | Age: 25
End: 2025-07-14
Payer: COMMERCIAL

## 2025-07-14 DIAGNOSIS — M32.9 SYSTEMIC LUPUS ERYTHEMATOSUS, UNSPECIFIED SLE TYPE, UNSPECIFIED ORGAN INVOLVEMENT STATUS (H): ICD-10-CM

## 2025-07-17 RX ORDER — HYDROXYCHLOROQUINE SULFATE 200 MG/1
200 TABLET, FILM COATED ORAL DAILY
Qty: 90 TABLET | Refills: 1 | OUTPATIENT
Start: 2025-07-17

## (undated) DEVICE — COVER ULTRASOUND PROBE W/GEL FLEXI-FEEL 6"X58" LF  25-FF658

## (undated) DEVICE — GOWN XLG DISP 9545

## (undated) DEVICE — Device

## (undated) DEVICE — SOL WATER IRRIG 1000ML BOTTLE 2F7114

## (undated) DEVICE — DRSG TELFA 3X8" 1238

## (undated) DEVICE — DRSG PRIMAPORE 02X3" 7133

## (undated) DEVICE — STRAP KNEE/BODY 31143004

## (undated) DEVICE — GLOVE PROTEXIS W/NEU-THERA 8.0  2D73TE80

## (undated) DEVICE — PAD CHUX UNDERPAD 30X30"

## (undated) DEVICE — NDL 18GA 1.5" FILTER

## (undated) DEVICE — LINEN TOWEL PACK X5 5464

## (undated) DEVICE — SYR 10ML PREFILLED 0.9% NACL INJ NOT STERILE 306547

## (undated) RX ORDER — LIDOCAINE HYDROCHLORIDE 10 MG/ML
INJECTION, SOLUTION EPIDURAL; INFILTRATION; INTRACAUDAL; PERINEURAL
Status: DISPENSED
Start: 2017-10-06

## (undated) RX ORDER — ACETAMINOPHEN 325 MG/1
TABLET ORAL
Status: DISPENSED
Start: 2017-10-06

## (undated) RX ORDER — PROPOFOL 10 MG/ML
INJECTION, EMULSION INTRAVENOUS
Status: DISPENSED
Start: 2017-10-06